# Patient Record
Sex: FEMALE | Race: WHITE | NOT HISPANIC OR LATINO | Employment: OTHER
[De-identification: names, ages, dates, MRNs, and addresses within clinical notes are randomized per-mention and may not be internally consistent; named-entity substitution may affect disease eponyms.]

---

## 2018-03-13 ENCOUNTER — TRANSCRIBE ORDERS (OUTPATIENT)
Dept: SCHEDULING | Age: 78
End: 2018-03-13

## 2018-03-13 DIAGNOSIS — S27.809A RUPTURE OF DIAPHRAGM: Primary | ICD-10-CM

## 2018-03-14 ENCOUNTER — HOSPITAL ENCOUNTER (OUTPATIENT)
Dept: RADIOLOGY | Facility: HOSPITAL | Age: 78
Discharge: HOME | End: 2018-03-14
Attending: SURGERY
Payer: COMMERCIAL

## 2018-03-14 DIAGNOSIS — S27.809A RUPTURE OF DIAPHRAGM: ICD-10-CM

## 2018-03-14 PROCEDURE — 76705 ECHO EXAM OF ABDOMEN: CPT

## 2018-03-15 ENCOUNTER — TRANSCRIBE ORDERS (OUTPATIENT)
Dept: CARDIOLOGY | Facility: HOSPITAL | Age: 78
End: 2018-03-15

## 2018-03-15 ENCOUNTER — HOSPITAL ENCOUNTER (OUTPATIENT)
Dept: CARDIOLOGY | Facility: HOSPITAL | Age: 78
Discharge: HOME | End: 2018-03-15
Attending: SURGERY
Payer: COMMERCIAL

## 2018-03-15 ENCOUNTER — APPOINTMENT (OUTPATIENT)
Dept: LAB | Facility: HOSPITAL | Age: 78
End: 2018-03-15
Attending: SURGERY
Payer: COMMERCIAL

## 2018-03-15 ENCOUNTER — TRANSCRIBE ORDERS (OUTPATIENT)
Dept: LAB | Facility: HOSPITAL | Age: 78
End: 2018-03-15

## 2018-03-15 DIAGNOSIS — S27.809A RUPTURE OF DIAPHRAGM: ICD-10-CM

## 2018-03-15 DIAGNOSIS — S27.809A RUPTURE OF DIAPHRAGM: Primary | ICD-10-CM

## 2018-03-15 LAB
ANION GAP SERPL CALC-SCNC: 7 MEQ/L (ref 3–15)
ATRIAL RATE: 70
BUN SERPL-MCNC: 13 MG/DL (ref 8–20)
CALCIUM SERPL-MCNC: 9.5 MG/DL (ref 8.9–10.3)
CHLORIDE SERPL-SCNC: 105 MMOL/L (ref 98–109)
CO2 SERPL-SCNC: 26 MMOL/L (ref 22–32)
CREAT SERPL-MCNC: 0.7 MG/DL (ref 0.6–1.1)
ERYTHROCYTE [DISTWIDTH] IN BLOOD BY AUTOMATED COUNT: 16 % (ref 11.7–14.4)
GFR SERPL CREATININE-BSD FRML MDRD: >60 ML/MIN/1.73M*2
GLUCOSE SERPL-MCNC: 100 MG/DL (ref 70–99)
HCT VFR BLDCO AUTO: 26.5 % (ref 35–45)
HGB BLD-MCNC: 8 G/DL (ref 11.8–15.7)
MCH RBC QN AUTO: 22.8 PG (ref 28–33.2)
MCHC RBC AUTO-ENTMCNC: 30.2 G/DL (ref 32.2–35.5)
MCV RBC AUTO: 75.5 FL (ref 83–98)
P AXIS: 27
PDW BLD AUTO: 10.2 FL (ref 9.4–12.3)
PLATELET # BLD AUTO: 273 K/UL (ref 150–369)
POTASSIUM SERPL-SCNC: 3.9 MMOL/L (ref 3.6–5.1)
PR INTERVAL: 174
QRS DURATION: 104
QT INTERVAL: 412
QTC CALCULATION(BAZETT): 444
R AXIS: 59
RBC # BLD AUTO: 3.51 M/UL (ref 3.93–5.22)
SODIUM SERPL-SCNC: 138 MMOL/L (ref 136–144)
T WAVE AXIS: 38
VENTRICULAR RATE: 70
WBC # BLD AUTO: 3.28 K/UL (ref 3.8–10.5)

## 2018-03-15 PROCEDURE — 36415 COLL VENOUS BLD VENIPUNCTURE: CPT

## 2018-03-15 PROCEDURE — 80048 BASIC METABOLIC PNL TOTAL CA: CPT

## 2018-03-15 PROCEDURE — 85027 COMPLETE CBC AUTOMATED: CPT

## 2018-03-15 PROCEDURE — 93005 ELECTROCARDIOGRAM TRACING: CPT

## 2018-03-19 ENCOUNTER — APPOINTMENT (OUTPATIENT)
Dept: LAB | Facility: HOSPITAL | Age: 78
End: 2018-03-19
Attending: SURGERY
Payer: COMMERCIAL

## 2018-03-19 ENCOUNTER — TRANSCRIBE ORDERS (OUTPATIENT)
Dept: LAB | Facility: HOSPITAL | Age: 78
End: 2018-03-19

## 2018-03-19 DIAGNOSIS — S27.809A RUPTURE OF DIAPHRAGM: ICD-10-CM

## 2018-03-19 DIAGNOSIS — S27.809A RUPTURE OF DIAPHRAGM: Primary | ICD-10-CM

## 2018-03-19 LAB
ABO + RH BLD: NORMAL
BLD GP AB SCN SERPL QL: NEGATIVE
BLOOD BANK CMNT PATIENT-IMP: NORMAL
D AG BLD QL: POSITIVE

## 2018-03-19 PROCEDURE — 36415 COLL VENOUS BLD VENIPUNCTURE: CPT

## 2018-03-19 PROCEDURE — 86920 COMPATIBILITY TEST SPIN: CPT | Mod: 91

## 2018-03-19 PROCEDURE — 86850 RBC ANTIBODY SCREEN: CPT

## 2018-03-19 PROCEDURE — 86920 COMPATIBILITY TEST SPIN: CPT

## 2018-03-28 ENCOUNTER — ANESTHESIA EVENT (OUTPATIENT)
Dept: OPERATING ROOM | Facility: HOSPITAL | Age: 78
Setting detail: HOSPITAL OUTPATIENT SURGERY
End: 2018-03-28
Payer: COMMERCIAL

## 2018-04-05 ENCOUNTER — HOSPITAL ENCOUNTER (OUTPATIENT)
Facility: HOSPITAL | Age: 78
Discharge: HOME | End: 2018-04-07
Attending: SURGERY | Admitting: SURGERY
Payer: COMMERCIAL

## 2018-04-05 ENCOUNTER — ANESTHESIA (OUTPATIENT)
Dept: OPERATING ROOM | Facility: HOSPITAL | Age: 78
Setting detail: HOSPITAL OUTPATIENT SURGERY
End: 2018-04-05
Payer: COMMERCIAL

## 2018-04-05 DIAGNOSIS — K44.9 HERNIA, HIATAL: ICD-10-CM

## 2018-04-05 LAB
ABO + RH BLD: NORMAL
D AG BLD QL: POSITIVE
ERYTHROCYTE [DISTWIDTH] IN BLOOD BY AUTOMATED COUNT: 16.2 % (ref 11.7–14.4)
HCT VFR BLDCO AUTO: 26.1 % (ref 35–45)
HGB BLD-MCNC: 7.8 G/DL (ref 11.8–15.7)
MCH RBC QN AUTO: 21.5 PG (ref 28–33.2)
MCHC RBC AUTO-ENTMCNC: 29.9 G/DL (ref 32.2–35.5)
MCV RBC AUTO: 71.9 FL (ref 83–98)
PDW BLD AUTO: 9.6 FL (ref 9.4–12.3)
PLATELET # BLD AUTO: 260 K/UL (ref 150–369)
RBC # BLD AUTO: 3.63 M/UL (ref 3.93–5.22)
WBC # BLD AUTO: 3.76 K/UL (ref 3.8–10.5)

## 2018-04-05 PROCEDURE — 71000011 HC PACU PHASE 1 EA ADDL MIN: Performed by: SURGERY

## 2018-04-05 PROCEDURE — 25800000 HC PHARMACY IV SOLUTIONS: Performed by: NURSE ANESTHETIST, CERTIFIED REGISTERED

## 2018-04-05 PROCEDURE — 36000016 HC OR LEVEL 6 EA ADDL MIN: Performed by: SURGERY

## 2018-04-05 PROCEDURE — S5000 PRESCRIPTION DRUG, GENERIC: HCPCS | Performed by: SURGERY

## 2018-04-05 PROCEDURE — 63700000 HC SELF-ADMINISTRABLE DRUG: Performed by: SURGERY

## 2018-04-05 PROCEDURE — 30233N1 TRANSFUSION OF NONAUTOLOGOUS RED BLOOD CELLS INTO PERIPHERAL VEIN, PERCUTANEOUS APPROACH: ICD-10-PCS | Performed by: SURGERY

## 2018-04-05 PROCEDURE — 63600000 HC DRUGS/DETAIL CODE: Performed by: NURSE ANESTHETIST, CERTIFIED REGISTERED

## 2018-04-05 PROCEDURE — 85027 COMPLETE CBC AUTOMATED: CPT | Performed by: SURGERY

## 2018-04-05 PROCEDURE — 63600000 HC DRUGS/DETAIL CODE: Mod: JW | Performed by: NURSE ANESTHETIST, CERTIFIED REGISTERED

## 2018-04-05 PROCEDURE — 88304 TISSUE EXAM BY PATHOLOGIST: CPT | Performed by: SURGERY

## 2018-04-05 PROCEDURE — 0DV44ZZ RESTRICTION OF ESOPHAGOGASTRIC JUNCTION, PERCUTANEOUS ENDOSCOPIC APPROACH: ICD-10-PCS | Performed by: SURGERY

## 2018-04-05 PROCEDURE — 0FT44ZZ RESECTION OF GALLBLADDER, PERCUTANEOUS ENDOSCOPIC APPROACH: ICD-10-PCS | Performed by: SURGERY

## 2018-04-05 PROCEDURE — 36000006 HC OR LEVEL 6 INITIAL 30MIN: Performed by: SURGERY

## 2018-04-05 PROCEDURE — 25800000 HC PHARMACY IV SOLUTIONS: Performed by: SURGERY

## 2018-04-05 PROCEDURE — 27200000 HC STERILE SUPPLY: Performed by: SURGERY

## 2018-04-05 PROCEDURE — 63600000 HC DRUGS/DETAIL CODE: Performed by: INTERNAL MEDICINE

## 2018-04-05 PROCEDURE — 8E0W4CZ ROBOTIC ASSISTED PROCEDURE OF TRUNK REGION, PERCUTANEOUS ENDOSCOPIC APPROACH: ICD-10-PCS | Performed by: SURGERY

## 2018-04-05 PROCEDURE — 36415 COLL VENOUS BLD VENIPUNCTURE: CPT | Performed by: SURGERY

## 2018-04-05 PROCEDURE — S5000 PRESCRIPTION DRUG, GENERIC: HCPCS | Performed by: NURSE ANESTHETIST, CERTIFIED REGISTERED

## 2018-04-05 PROCEDURE — 0BQT4ZZ REPAIR DIAPHRAGM, PERCUTANEOUS ENDOSCOPIC APPROACH: ICD-10-PCS | Performed by: SURGERY

## 2018-04-05 PROCEDURE — 37000001 HC ANESTHESIA GENERAL: Performed by: SURGERY

## 2018-04-05 PROCEDURE — 71000001 HC PACU PHASE 1 INITIAL 30MIN: Performed by: SURGERY

## 2018-04-05 PROCEDURE — 25000000 HC PHARMACY GENERAL: Performed by: NURSE ANESTHETIST, CERTIFIED REGISTERED

## 2018-04-05 PROCEDURE — 25000000 HC PHARMACY GENERAL: Performed by: SURGERY

## 2018-04-05 RX ORDER — LIDOCAINE HYDROCHLORIDE 20 MG/ML
INJECTION, SOLUTION INFILTRATION; PERINEURAL AS NEEDED
Status: DISCONTINUED | OUTPATIENT
Start: 2018-04-05 | End: 2018-04-05 | Stop reason: HOSPADM

## 2018-04-05 RX ORDER — SODIUM CHLORIDE 9 MG/ML
INJECTION, SOLUTION INTRAVENOUS CONTINUOUS
Status: DISCONTINUED | OUTPATIENT
Start: 2018-04-05 | End: 2018-04-05

## 2018-04-05 RX ORDER — GLYCOPYRROLATE 0.6MG/3ML
SYRINGE (ML) INTRAVENOUS AS NEEDED
Status: DISCONTINUED | OUTPATIENT
Start: 2018-04-05 | End: 2018-04-05 | Stop reason: SURG

## 2018-04-05 RX ORDER — MIDAZOLAM HYDROCHLORIDE 2 MG/2ML
INJECTION, SOLUTION INTRAMUSCULAR; INTRAVENOUS AS NEEDED
Status: DISCONTINUED | OUTPATIENT
Start: 2018-04-05 | End: 2018-04-05 | Stop reason: SURG

## 2018-04-05 RX ORDER — NEOSTIGMINE METHYLSULFATE 1 MG/ML
INJECTION INTRAVENOUS AS NEEDED
Status: DISCONTINUED | OUTPATIENT
Start: 2018-04-05 | End: 2018-04-05 | Stop reason: SURG

## 2018-04-05 RX ORDER — FENTANYL CITRATE 50 UG/ML
50 INJECTION, SOLUTION INTRAMUSCULAR; INTRAVENOUS
Status: DISCONTINUED | OUTPATIENT
Start: 2018-04-05 | End: 2018-04-05 | Stop reason: HOSPADM

## 2018-04-05 RX ORDER — ONDANSETRON HYDROCHLORIDE 2 MG/ML
4 INJECTION, SOLUTION INTRAVENOUS EVERY 6 HOURS PRN
Status: DISCONTINUED | OUTPATIENT
Start: 2018-04-05 | End: 2018-04-07 | Stop reason: HOSPADM

## 2018-04-05 RX ORDER — ONDANSETRON HYDROCHLORIDE 2 MG/ML
4 INJECTION, SOLUTION INTRAVENOUS
Status: DISCONTINUED | OUTPATIENT
Start: 2018-04-05 | End: 2018-04-05 | Stop reason: HOSPADM

## 2018-04-05 RX ORDER — SODIUM CHLORIDE, SODIUM GLUCONATE, SODIUM ACETATE, POTASSIUM CHLORIDE AND MAGNESIUM CHLORIDE 30; 37; 368; 526; 502 MG/100ML; MG/100ML; MG/100ML; MG/100ML; MG/100ML
INJECTION, SOLUTION INTRAVENOUS CONTINUOUS PRN
Status: DISCONTINUED | OUTPATIENT
Start: 2018-04-05 | End: 2018-04-05 | Stop reason: SURG

## 2018-04-05 RX ORDER — BUPIVACAINE HYDROCHLORIDE 5 MG/ML
INJECTION, SOLUTION PERINEURAL AS NEEDED
Status: DISCONTINUED | OUTPATIENT
Start: 2018-04-05 | End: 2018-04-05 | Stop reason: HOSPADM

## 2018-04-05 RX ORDER — SODIUM CHLORIDE 9 MG/ML
5 INJECTION, SOLUTION INTRAVENOUS AS NEEDED
Status: DISCONTINUED | OUTPATIENT
Start: 2018-04-05 | End: 2018-04-05

## 2018-04-05 RX ORDER — PHENYLEPHRINE HYDROCHLORIDE 10 MG/ML
INJECTION INTRAVENOUS AS NEEDED
Status: DISCONTINUED | OUTPATIENT
Start: 2018-04-05 | End: 2018-04-05 | Stop reason: SURG

## 2018-04-05 RX ORDER — ASPIRIN 81 MG/1
81 TABLET ORAL EVERY MORNING
Status: DISCONTINUED | OUTPATIENT
Start: 2018-04-06 | End: 2018-04-07 | Stop reason: HOSPADM

## 2018-04-05 RX ORDER — DEXAMETHASONE SODIUM PHOSPHATE 4 MG/ML
INJECTION, SOLUTION INTRA-ARTICULAR; INTRALESIONAL; INTRAMUSCULAR; INTRAVENOUS; SOFT TISSUE AS NEEDED
Status: DISCONTINUED | OUTPATIENT
Start: 2018-04-05 | End: 2018-04-05 | Stop reason: SURG

## 2018-04-05 RX ORDER — FENTANYL CITRATE 50 UG/ML
INJECTION, SOLUTION INTRAMUSCULAR; INTRAVENOUS AS NEEDED
Status: DISCONTINUED | OUTPATIENT
Start: 2018-04-05 | End: 2018-04-05 | Stop reason: SURG

## 2018-04-05 RX ORDER — AMLODIPINE BESYLATE 5 MG/1
5 TABLET ORAL NIGHTLY
Status: DISCONTINUED | OUTPATIENT
Start: 2018-04-05 | End: 2018-04-07 | Stop reason: HOSPADM

## 2018-04-05 RX ORDER — SODIUM CHLORIDE 9 MG/ML
80 INJECTION, SOLUTION INTRAVENOUS CONTINUOUS
Status: DISCONTINUED | OUTPATIENT
Start: 2018-04-05 | End: 2018-04-07 | Stop reason: HOSPADM

## 2018-04-05 RX ORDER — ONDANSETRON HYDROCHLORIDE 2 MG/ML
INJECTION, SOLUTION INTRAVENOUS AS NEEDED
Status: DISCONTINUED | OUTPATIENT
Start: 2018-04-05 | End: 2018-04-05 | Stop reason: SURG

## 2018-04-05 RX ORDER — ROCURONIUM BROMIDE 10 MG/ML
INJECTION, SOLUTION INTRAVENOUS AS NEEDED
Status: DISCONTINUED | OUTPATIENT
Start: 2018-04-05 | End: 2018-04-05 | Stop reason: SURG

## 2018-04-05 RX ORDER — HYDROMORPHONE HYDROCHLORIDE 1 MG/ML
0.5 INJECTION, SOLUTION INTRAMUSCULAR; INTRAVENOUS; SUBCUTANEOUS
Status: DISCONTINUED | OUTPATIENT
Start: 2018-04-05 | End: 2018-04-05 | Stop reason: HOSPADM

## 2018-04-05 RX ORDER — MORPHINE SULFATE 1 MG/ML
INJECTION, SOLUTION EPIDURAL; INTRATHECAL; INTRAVENOUS AS NEEDED
Status: DISCONTINUED | OUTPATIENT
Start: 2018-04-05 | End: 2018-04-05 | Stop reason: SURG

## 2018-04-05 RX ORDER — PROPOFOL 10 MG/ML
INJECTION, EMULSION INTRAVENOUS AS NEEDED
Status: DISCONTINUED | OUTPATIENT
Start: 2018-04-05 | End: 2018-04-05 | Stop reason: SURG

## 2018-04-05 RX ORDER — ACETAMINOPHEN 325 MG/1
975 TABLET ORAL ONCE
Status: COMPLETED | OUTPATIENT
Start: 2018-04-05 | End: 2018-04-05

## 2018-04-05 RX ORDER — LIDOCAINE HYDROCHLORIDE 10 MG/ML
INJECTION, SOLUTION INFILTRATION; PERINEURAL AS NEEDED
Status: DISCONTINUED | OUTPATIENT
Start: 2018-04-05 | End: 2018-04-05 | Stop reason: SURG

## 2018-04-05 RX ORDER — MORPHINE SULFATE 4 MG/ML
1-2 INJECTION, SOLUTION INTRAMUSCULAR; INTRAVENOUS
Status: DISCONTINUED | OUTPATIENT
Start: 2018-04-05 | End: 2018-04-07 | Stop reason: HOSPADM

## 2018-04-05 RX ORDER — ATORVASTATIN CALCIUM 40 MG/1
40 TABLET, FILM COATED ORAL NIGHTLY
Status: DISCONTINUED | OUTPATIENT
Start: 2018-04-05 | End: 2018-04-07 | Stop reason: HOSPADM

## 2018-04-05 RX ORDER — EPHEDRINE SULFATE 50 MG/ML
INJECTION, SOLUTION INTRAVENOUS AS NEEDED
Status: DISCONTINUED | OUTPATIENT
Start: 2018-04-05 | End: 2018-04-05 | Stop reason: SURG

## 2018-04-05 RX ADMIN — Medication 5 MG: at 17:11

## 2018-04-05 RX ADMIN — MORPHINE SULFATE 2 MG: 1 INJECTION, SOLUTION EPIDURAL; INTRATHECAL; INTRAVENOUS at 17:25

## 2018-04-05 RX ADMIN — ONDANSETRON 4 MG: 2 INJECTION INTRAMUSCULAR; INTRAVENOUS at 17:02

## 2018-04-05 RX ADMIN — ROCURONIUM BROMIDE 20 MG: 10 INJECTION INTRAVENOUS at 15:19

## 2018-04-05 RX ADMIN — EPHEDRINE SULFATE 10 MG: 50 INJECTION INTRAVENOUS at 15:10

## 2018-04-05 RX ADMIN — ROCURONIUM BROMIDE 10 MG: 10 INJECTION INTRAVENOUS at 16:21

## 2018-04-05 RX ADMIN — PHENYLEPHRINE HYDROCHLORIDE 100 MCG: 10 INJECTION INTRAVENOUS at 16:57

## 2018-04-05 RX ADMIN — AMLODIPINE BESYLATE 5 MG: 5 TABLET ORAL at 22:03

## 2018-04-05 RX ADMIN — FENTANYL CITRATE 50 MCG: 50 INJECTION INTRAMUSCULAR; INTRAVENOUS at 18:30

## 2018-04-05 RX ADMIN — PROPOFOL 150 MG: 10 INJECTION, EMULSION INTRAVENOUS at 14:18

## 2018-04-05 RX ADMIN — FENTANYL CITRATE 25 MCG: 50 INJECTION INTRAMUSCULAR; INTRAVENOUS at 15:06

## 2018-04-05 RX ADMIN — MORPHINE SULFATE 2 MG: 1 INJECTION, SOLUTION EPIDURAL; INTRATHECAL; INTRAVENOUS at 16:31

## 2018-04-05 RX ADMIN — ACETAMINOPHEN 975 MG: 325 TABLET, FILM COATED ORAL at 11:51

## 2018-04-05 RX ADMIN — MIDAZOLAM HYDROCHLORIDE 2 MG: 1 INJECTION, SOLUTION INTRAMUSCULAR; INTRAVENOUS at 14:11

## 2018-04-05 RX ADMIN — ROCURONIUM BROMIDE 50 MG: 10 INJECTION INTRAVENOUS at 14:18

## 2018-04-05 RX ADMIN — DEXAMETHASONE SODIUM PHOSPHATE 4 MG: 4 INJECTION, SOLUTION INTRAMUSCULAR; INTRAVENOUS at 14:30

## 2018-04-05 RX ADMIN — FENTANYL CITRATE 100 MCG: 50 INJECTION INTRAMUSCULAR; INTRAVENOUS at 14:18

## 2018-04-05 RX ADMIN — Medication 0.8 MG: at 17:11

## 2018-04-05 RX ADMIN — LIDOCAINE HYDROCHLORIDE 5 ML: 10 INJECTION, SOLUTION INFILTRATION; PERINEURAL at 14:18

## 2018-04-05 RX ADMIN — ATORVASTATIN CALCIUM 40 MG: 40 TABLET, FILM COATED ORAL at 22:03

## 2018-04-05 RX ADMIN — PHENYLEPHRINE HYDROCHLORIDE 100 MCG: 10 INJECTION INTRAVENOUS at 16:05

## 2018-04-05 RX ADMIN — MORPHINE SULFATE 2 MG: 1 INJECTION, SOLUTION EPIDURAL; INTRATHECAL; INTRAVENOUS at 17:33

## 2018-04-05 RX ADMIN — FENTANYL CITRATE 25 MCG: 50 INJECTION INTRAMUSCULAR; INTRAVENOUS at 15:20

## 2018-04-05 RX ADMIN — PHENYLEPHRINE HYDROCHLORIDE 100 MCG: 10 INJECTION INTRAVENOUS at 14:31

## 2018-04-05 RX ADMIN — SODIUM CHLORIDE: 9 INJECTION, SOLUTION INTRAVENOUS at 10:00

## 2018-04-05 RX ADMIN — MORPHINE SULFATE 2 MG: 1 INJECTION, SOLUTION EPIDURAL; INTRATHECAL; INTRAVENOUS at 16:40

## 2018-04-05 RX ADMIN — EPHEDRINE SULFATE 10 MG: 50 INJECTION INTRAVENOUS at 15:07

## 2018-04-05 RX ADMIN — MORPHINE SULFATE 4 MG: 1 INJECTION, SOLUTION EPIDURAL; INTRATHECAL; INTRAVENOUS at 15:35

## 2018-04-05 RX ADMIN — SODIUM CHLORIDE, SODIUM GLUCONATE, SODIUM ACETATE, POTASSIUM CHLORIDE AND MAGNESIUM CHLORIDE: 526; 502; 368; 37; 30 INJECTION, SOLUTION INTRAVENOUS at 14:23

## 2018-04-05 RX ADMIN — FENTANYL CITRATE 50 MCG: 50 INJECTION INTRAMUSCULAR; INTRAVENOUS at 14:50

## 2018-04-05 RX ADMIN — SODIUM CHLORIDE: 9 INJECTION, SOLUTION INTRAVENOUS at 19:42

## 2018-04-05 ASSESSMENT — PAIN SCALES - GENERAL: PAIN_LEVEL: 4

## 2018-04-05 NOTE — ANESTHESIA PREPROCEDURE EVALUATION
Anesthesia ROS/MED HX    Anesthesia History - neg  Pulmonary - neg  Neuro/Psych - neg  Cardiovascular   ECG reviewed   Normal ECG   hypertension  GI/Hepatic   Hiatal hernia   GERD    Control: well controlled  Musculoskeletal- neg  Endo/Other   Chronic renal disease (nephrolithiasis)   Body Habitus: Normal  ROS/MED HX Comments:    Cardiology: Anemia- hgb 7.8. GI workup negative      Relevant Problems   No active problems are marked relevant to this note.       Physical Exam    Airway   Mallampati: II   TM distance: >3 FB   Neck ROM: full  Cardiovascular - normal   Rhythm: regular   Rate: normal  Pulmonary - normal   clear to auscultation  Other Findings   Implants througout        Anesthesia Plan    Plan: general    Technique: general endotracheal     Airway: oral intubation   ASA 2  Blood Products:     Use of Blood Products Discussed: Yes     Consented to blood products  Anesthetic plan and risks discussed with: patient  Induction:    intravenous   Postop Plan:   Patient Disposition: inpatient floor planned admission  Comments:    Plan: I unit PRBCs preop

## 2018-04-05 NOTE — INTERVAL H&P NOTE
H&P updated. The patient was examined and there are no changes.  Pt more anemic, will receive 1 U PRBCs

## 2018-04-05 NOTE — ANESTHESIA POSTPROCEDURE EVALUATION
Patient: Nelly Meyer    Procedure Summary     Date:  04/05/18 Room / Location:   OR  /  OR    Anesthesia Start:  1411 Anesthesia Stop:  1743    Procedure:  Robotic Assisted Laparoscopic Hiatal Hernia Repair, Nissen, Laparoscopic Cholecystectomy (N/A ) Diagnosis:  (Hiatal Hernia)    Surgeon:  Durga Xie DO Responsible Provider:  Kadi De La Cruz DO    Anesthesia Type:  general ASA Status:  2          Anesthesia Type: general  PACU Vitals     No data found.            Anesthesia Post Evaluation    Pain score: 4  Pain management: adequate  Patient location during evaluation: PACU  Patient participation: complete - patient participated  Level of consciousness: awake and alert  Cardiovascular status: acceptable  Respiratory status: acceptable  Hydration status: acceptable  Anesthetic complications: no

## 2018-04-05 NOTE — OP NOTE
Date: 5 April 2018    Surgeon: Rojas    Procedure: 1.  Robotically assisted laparoscopic hiatal hernia repair and Nissen fundoplication 2.  Robotically assisted laparoscopic cholecystectomy    Preoperative diagnosis #1 paraesophageal hernia #2 hiatal hernia #3 chronic calculus cholecystitis    Postoperative diagnosis: #1 same as above #2 same as above #3 same as above    Indication: Patient is a 77-year-old white female with reflux and minimal dysphasia and anemia.  She was discovered to have a sizable paraesophageal hernia on endoscopy with Pankaj's ulcers.  The patient also complained of some intermittent right upper quadrant abdominal pain and ultrasound demonstrated gallstones.    Anesthesia: General with endotracheal tube    Estimated blood loss: 30 cc    Complications: None    Specimens: Hiatal hernia sac and gallbladder    Findings: Moderately sized paraesophageal hiatal hernia.  Gallbladder with adhesions  Details of procedure: After obtaining informed consent and correctly identified the patient she was brought to the operative suite and placed in the supine position.  After induction of  General anesthesia Lopez catheter was placed.  A footboard was placed below the patient's feet.  Left arm was tucked at her side.  She was placed in a flexed head position to extend the patient.  Her abdomen was then prepped and draped in standard surgical fashion.  It was entered in the left upper quadrant and a Visiport fashion.  Once the abdomen was introduced insufflated to pressure of 15 mmHg.  3 8 mm robotic trochars were placed one above the umbilicus and then one on each side of this.  An additional 5 mm laparoscopic trocar was placed in the lateral right upper quadrant.  The liver retractor was placed in and used to elevate the left lobe of the liver.  This was fixed in place with the iron intern.  Patient was placed in a steep reverse Trendelenburg position.  The robot was brought in and docked.  Using vessel  sealer and fenestrated bipolar grasper the hernia sac was dissected out of the lower mediastinum after reducing stomach back into the abdomen.  Hernia sac was amputated off of the fundus of the stomach and the gastroesophageal junction where it was attached.  It was set beneath the gallbladder to be removed without specimen.  Dissection was then undertaken to free the stomach and esophagus from the crura of the diaphragm.  The retroesophageal window of good size was created with blunt dissection and the vessel sealer.  Following this the short gastric vessels were taken down to free the fundus and proximal greater curvature of the stomach.  Fundus was seen to pass easily from left to right.  Following this the hiatal hernia was repaired with 5 interrupted 0 Ethibond sutures.  3 of these were posteriorly into anterior.  A 54 Vietnamese bougie was then placed under direct visualization.  There was initially some resistance and this could not be passed beyond 50 cm.  I retracted the stomach and this was seen to be caught at the distal esophagus.  This was pulled back and was straight and the esophagus passed easily into the stomach.  The fundus was passed around the back of the gastroesophageal junction prior to fully placing the bougie to a depth of 65 cm.  3 interrupted 0 Ethibond sutures were then placed to suture the fundus to itself anteriorly with the proximal 2 sutures taking bites of esophagus.  Following this the bougie was removed.  The fundoplication was sutured to the diaphragmatic hiatus medially and laterally.  This was done with an interrupted 0 Ethibond suture.  After this the liver retractor was removed.  Assistant grasped the fundus the gallbladder and retract this anteriorly and superiorly.  The omental adhesions to the gallbladder were taken down with electrocautery and blunt dissection.  The Alejandro's pouch was grasped with another grasper and retracted anteriorly and inferiorly.  The fat and  peritoneum were dissected off the neck of the gallbladder.  This did reveal the cystic duct without a distinct cystic artery.  The cystic artery was clipped with 2 clips distally, one proximally and transected between.  Gallbladder was then dissected off the liver bed with electrocautery.  This along with hernia sac were placed in an Endobag and removed through the left upper quadrant robotic trocar after undocking the robot.  The fascia and this incision which had to be enlarged and have an 11 mm trocar placed in it to accommodate the bag was reapproximated with a single interrupted 0 Vicryl using the bursae fascial closure device.  The abdomen was then desufflated and all trochars removed.  The enlarged incision was irrigated and dried.  All skin edges were inspected for bleeding all bleeding was controlled with electrocautery.  Skin was then approximated with 4-0 Monocryl in a running some particular interrupted fashion.  The wounds were washed and dried and Mastisol and Steri-Strips were applied.  The patient tolerated procedure well left the operating room for the recovery room in stable condition after being extubated and having her Lopez catheter removed.

## 2018-04-06 PROCEDURE — S5000 PRESCRIPTION DRUG, GENERIC: HCPCS | Performed by: SURGERY

## 2018-04-06 PROCEDURE — 63700000 HC SELF-ADMINISTRABLE DRUG: Performed by: SURGERY

## 2018-04-06 PROCEDURE — 25800000 HC PHARMACY IV SOLUTIONS: Performed by: SURGERY

## 2018-04-06 PROCEDURE — 63700000 HC SELF-ADMINISTRABLE DRUG: Performed by: NURSE PRACTITIONER

## 2018-04-06 RX ORDER — ACETAMINOPHEN 325 MG/1
650 TABLET ORAL EVERY 6 HOURS PRN
Status: DISCONTINUED | OUTPATIENT
Start: 2018-04-06 | End: 2018-04-07 | Stop reason: HOSPADM

## 2018-04-06 RX ADMIN — LISINOPRIL: 20 TABLET ORAL at 21:47

## 2018-04-06 RX ADMIN — AMLODIPINE BESYLATE 5 MG: 5 TABLET ORAL at 21:48

## 2018-04-06 RX ADMIN — ACETAMINOPHEN 650 MG: 325 TABLET, FILM COATED ORAL at 21:48

## 2018-04-06 RX ADMIN — SODIUM CHLORIDE 80 ML/HR: 9 INJECTION, SOLUTION INTRAVENOUS at 21:46

## 2018-04-06 RX ADMIN — ASPIRIN 81 MG: 81 TABLET, COATED ORAL at 21:47

## 2018-04-06 RX ADMIN — SODIUM CHLORIDE 80 ML/HR: 9 INJECTION, SOLUTION INTRAVENOUS at 08:53

## 2018-04-06 RX ADMIN — ATORVASTATIN CALCIUM 40 MG: 40 TABLET, FILM COATED ORAL at 21:47

## 2018-04-06 ASSESSMENT — COGNITIVE AND FUNCTIONAL STATUS - GENERAL
DRESSING REGULAR LOWER BODY CLOTHING: 4 - NONE
STANDING UP FROM CHAIR USING ARMS: 4 - NONE
TOILETING: 4 - NONE
WALKING IN HOSPITAL ROOM: 4 - NONE
DRESSING REGULAR UPPER BODY CLOTHING: 4 - NONE
CLIMB 3 TO 5 STEPS WITH RAILING: 4 - NONE
HELP NEEDED FOR BATHING: 4 - NONE
MOVING TO AND FROM BED TO CHAIR: 4 - NONE
EATING MEALS: 4 - NONE
HELP NEEDED FOR PERSONAL GROOMING: 4 - NONE

## 2018-04-06 NOTE — PLAN OF CARE
Problem: Patient Care Overview  Goal: Plan of Care Review  Outcome: Ongoing (interventions implemented as appropriate)   04/06/18 7107   Coping/Psychosocial   Plan Of Care Reviewed With patient   Plan of Care Review   Progress improving

## 2018-04-06 NOTE — PROGRESS NOTES
4/6/201810:04 AM sw met with pt and pts sister at bedside to introduce CC role and discuss dc planning, pt stated she is feeling pretty good a bit sore but overall is feeling well, prior to admission pt was very independent, pt lives alone in a first floor condo, confirmed PCP, pt stated that her sister is going to be staying with her to provide additional support int he home upon discharge. Pt and sister at this time do not anticipate any dc needs. Reviewed MD note and plan is for dc tomorrow pending medical clearance. Pt did not have any follow up questions or concerns. Estefany Anthony LSW [phone]

## 2018-04-06 NOTE — PROGRESS NOTES
"General Surgery Daily Progress Note    Subjective: Pt seen and examined.  C/o abd soreness.  Giuliana CLs    Vital signs in last 24 hours:  Temp:  [36.1 °C (97 °F)-37.3 °C (99.2 °F)] 36.5 °C (97.7 °F)  Heart Rate:  [69-95] 71  Resp:  [9-18] 18  BP: (116-158)/(56-73) 154/65    Intake/Output this shift:    Intake/Output Summary (Last 24 hours) at 04/06/18 0805  Last data filed at 04/06/18 0600   Gross per 24 hour   Intake             4049 ml   Output              900 ml   Net             3149 ml       Physical Exam    General appearance: alert, appears stated age and cooperative    Abdomen: soft, non-tender, inc CDI; bowel sounds normal; no masses, no organomegaly\"      Labs        Results from last 7 days  Lab Units 04/05/18  0942   WBC K/uL 3.76*   HEMOGLOBIN g/dL 7.8*   HEMATOCRIT % 26.1*   PLATELETS K/uL 260               Results from last 7 days  Lab Units 04/05/18  0942   HEMOGLOBIN g/dL 7.8*   HEMATOCRIT % 26.1*     No results found for: LIPASE  Pathology Results     ** No results found for the last 720 hours. **              Imaging  Ultrasound Abdomen Limited    Result Date: 3/14/2018  IMPRESSION: Cholelithiasis.        Assessment   POD1 s/p robot asst lap HH repair/Nissen/lap janny    Plan   Adv to FLs, OOBTC, likely d/c tomorrow         Durga Xie, DO        "

## 2018-04-06 NOTE — PLAN OF CARE
Problem: Patient Care Overview  Goal: Plan of Care Review  Outcome: Ongoing (interventions implemented as appropriate)   04/05/18 7604   Coping/Psychosocial   Plan Of Care Reviewed With patient   Plan of Care Review   Progress improving

## 2018-04-07 VITALS
DIASTOLIC BLOOD PRESSURE: 70 MMHG | RESPIRATION RATE: 18 BRPM | TEMPERATURE: 97.9 F | OXYGEN SATURATION: 92 % | HEIGHT: 67 IN | BODY MASS INDEX: 26.53 KG/M2 | HEART RATE: 72 BPM | SYSTOLIC BLOOD PRESSURE: 157 MMHG | WEIGHT: 169 LBS

## 2018-04-07 PROCEDURE — 63700000 HC SELF-ADMINISTRABLE DRUG: Performed by: NURSE PRACTITIONER

## 2018-04-07 RX ADMIN — ACETAMINOPHEN 650 MG: 325 TABLET, FILM COATED ORAL at 12:27

## 2018-04-07 NOTE — PROGRESS NOTES
General Surgery Daily Progress Note    Subjective     Tolerating full liquids. Anxious to get home. Pain controlled.       Objective     Vital signs in last 24 hours:  Temp:  [36.7 °C (98 °F)-36.9 °C (98.4 °F)] 36.9 °C (98.4 °F)  Heart Rate:  [72-76] 76  Resp:  [18] 18  BP: (140-153)/(67-70) 153/67      Intake/Output Summary (Last 24 hours) at 04/07/18 1556  Last data filed at 04/07/18 1500   Gross per 24 hour   Intake              440 ml   Output              600 ml   Net             -160 ml     Intake/Output this shift:  I/O this shift:  In: 440 [P.O.:440]  Out: -     Physical Exam    General appearance: Awake, alert, in no acute distress or obvious discomfort  Neurologic: Alert, cooperative. Coherent speech.  Lungs: Respirations nonlabored, no tachypnea. Clear to auscultation  Heart:: Regular rate, rhythm.  Abdomen: soft, nontender. Mild ecchymosis at 2 incisions. Incisions + steris, intact, no drainage.   Extremities: No cyanosis, edema.       Labs  CBC Results       04/05/18 03/15/18                       0942 0905          WBC 3.76 (L) 3.28 (L)          RBC 3.63 (L) 3.51 (L)          HGB 7.8 (L) 8.0 (L)          HCT 26.1 (L) 26.5 (L)          MCV 71.9 (L) 75.5 (L)          MCH 21.5 (L) 22.8 (L)          MCHC 29.9 (L) 30.2 (L)           273                       BMP Results       03/15/18                          0905                      K 3.9           Cl 105           CO2 26           Glucose 100 (H)           BUN 13           Creatinine 0.7           Calcium 9.5           Anion Gap 7           EGFR &gt;60.0                             Assessment/Plan   POD 2 s/p lap janny, Nissen, doing well. Stable for d/c to home.       Yaneth Morris MD

## 2018-04-07 NOTE — NURSING NOTE
Pt being d/c to home.  All instructions explained to pt.  Pt verbalized an understanding.  All belongings with pt.  Ivs removed.

## 2018-04-09 LAB
CASE RPRT: NORMAL
CLINICAL INFO: NORMAL
CROSSMATCH: NORMAL
ISBT CODE: 6200
PATH REPORT.FINAL DX SPEC: NORMAL
PATH REPORT.GROSS SPEC: NORMAL
PATH REPORT.MICROSCOPIC SPEC OTHER STN: NORMAL
PRODUCT CODE: NORMAL
PRODUCT STATUS: NORMAL
SPECIMEN EXP DATE BLD: NORMAL
UNIT ABO: NORMAL
UNIT ID: NORMAL
UNIT RH: POSITIVE

## 2018-04-13 ENCOUNTER — TRANSCRIBE ORDERS (OUTPATIENT)
Dept: LAB | Facility: HOSPITAL | Age: 78
End: 2018-04-13

## 2018-04-13 ENCOUNTER — APPOINTMENT (OUTPATIENT)
Dept: LAB | Facility: HOSPITAL | Age: 78
End: 2018-04-13
Attending: SURGERY
Payer: COMMERCIAL

## 2018-04-13 DIAGNOSIS — S27.809A RUPTURE OF DIAPHRAGM: Primary | ICD-10-CM

## 2018-04-13 DIAGNOSIS — S27.809A RUPTURE OF DIAPHRAGM: ICD-10-CM

## 2018-04-13 LAB
ERYTHROCYTE [DISTWIDTH] IN BLOOD BY AUTOMATED COUNT: 17.4 % (ref 11.7–14.4)
HCT VFR BLDCO AUTO: 28.5 % (ref 35–45)
HGB BLD-MCNC: 8.8 G/DL (ref 11.8–15.7)
MCH RBC QN AUTO: 22.3 PG (ref 28–33.2)
MCHC RBC AUTO-ENTMCNC: 30.9 G/DL (ref 32.2–35.5)
MCV RBC AUTO: 72.2 FL (ref 83–98)
PDW BLD AUTO: 10.1 FL (ref 9.4–12.3)
PLATELET # BLD AUTO: 288 K/UL (ref 150–369)
RBC # BLD AUTO: 3.95 M/UL (ref 3.93–5.22)
WBC # BLD AUTO: 4.44 K/UL (ref 3.8–10.5)

## 2018-04-13 PROCEDURE — 36415 COLL VENOUS BLD VENIPUNCTURE: CPT

## 2018-04-13 PROCEDURE — 85027 COMPLETE CBC AUTOMATED: CPT

## 2018-05-17 ENCOUNTER — TRANSCRIBE ORDERS (OUTPATIENT)
Dept: LAB | Facility: HOSPITAL | Age: 78
End: 2018-05-17

## 2018-05-17 ENCOUNTER — APPOINTMENT (OUTPATIENT)
Dept: LAB | Facility: HOSPITAL | Age: 78
End: 2018-05-17
Attending: SURGERY
Payer: COMMERCIAL

## 2018-05-17 DIAGNOSIS — S27.809A RUPTURE OF DIAPHRAGM: ICD-10-CM

## 2018-05-17 DIAGNOSIS — S27.809A RUPTURE OF DIAPHRAGM: Primary | ICD-10-CM

## 2018-05-17 LAB
ACANTHOCYTES BLD QL SMEAR: ABNORMAL
ANISOCYTOSIS BLD QL SMEAR: ABNORMAL
BASOPHILS # BLD: 0.05 K/UL (ref 0.01–0.1)
BASOPHILS NFR BLD: 1.5 %
BURR CELLS BLD QL SMEAR: ABNORMAL
DACRYOCYTES BLD QL SMEAR: ABNORMAL
DIFFERENTIAL METHOD BLD: ABNORMAL
EOSINOPHIL # BLD: 0.14 K/UL (ref 0.04–0.36)
EOSINOPHIL NFR BLD: 4.1 %
ERYTHROCYTE [DISTWIDTH] IN BLOOD BY AUTOMATED COUNT: 20.3 % (ref 11.7–14.4)
HCT VFR BLDCO AUTO: 30.6 % (ref 35–45)
HGB BLD-MCNC: 9.2 G/DL (ref 11.8–15.7)
HYPOCHROMIA BLD QL SMEAR: ABNORMAL
IMM GRANULOCYTES # BLD AUTO: 0.01 K/UL (ref 0–0.08)
IMM GRANULOCYTES NFR BLD AUTO: 0.3 %
LYMPHOCYTES # BLD: 1.43 K/UL (ref 1.2–3.5)
LYMPHOCYTES NFR BLD: 41.7 %
MCH RBC QN AUTO: 22.2 PG (ref 28–33.2)
MCHC RBC AUTO-ENTMCNC: 30.1 G/DL (ref 32.2–35.5)
MCV RBC AUTO: 73.7 FL (ref 83–98)
MICROCYTES BLD QL SMEAR: ABNORMAL
MONOCYTES # BLD: 0.39 K/UL (ref 0.28–0.8)
MONOCYTES NFR BLD: 11.4 %
NEUTROPHILS # BLD: 1.41 K/UL (ref 1.7–7)
NEUTS SEG NFR BLD: 41 %
NRBC BLD-RTO: 0 %
OVALOCYTES BLD QL SMEAR: ABNORMAL
PDW BLD AUTO: 10.3 FL (ref 9.4–12.3)
PLAT MORPH BLD: NORMAL
PLATELET # BLD AUTO: 265 K/UL (ref 150–369)
PLATELET # BLD EST: ABNORMAL 10*3/UL
POIKILOCYTOSIS BLD QL SMEAR: ABNORMAL
POLYCHROMASIA BLD QL SMEAR: ABNORMAL
RBC # BLD AUTO: 4.15 M/UL (ref 3.93–5.22)
SCHISTOCYTES BLD QL SMEAR: ABNORMAL
WBC # BLD AUTO: 3.43 K/UL (ref 3.8–10.5)

## 2018-05-17 PROCEDURE — 36415 COLL VENOUS BLD VENIPUNCTURE: CPT

## 2018-05-17 PROCEDURE — 85025 COMPLETE CBC W/AUTO DIFF WBC: CPT

## 2018-06-14 ENCOUNTER — APPOINTMENT (OUTPATIENT)
Dept: LAB | Facility: HOSPITAL | Age: 78
End: 2018-06-14
Attending: SURGERY
Payer: COMMERCIAL

## 2018-06-14 ENCOUNTER — TRANSCRIBE ORDERS (OUTPATIENT)
Dept: LAB | Facility: HOSPITAL | Age: 78
End: 2018-06-14

## 2018-06-14 DIAGNOSIS — K80.10 CALCULUS OF GALLBLADDER WITH CHRONIC CHOLECYSTITIS WITHOUT OBSTRUCTION: ICD-10-CM

## 2018-06-14 DIAGNOSIS — K80.10 CALCULUS OF GALLBLADDER WITH CHRONIC CHOLECYSTITIS WITHOUT OBSTRUCTION: Primary | ICD-10-CM

## 2018-06-14 DIAGNOSIS — K44.9 DIAPHRAGMATIC HERNIA WITHOUT OBSTRUCTION OR GANGRENE: ICD-10-CM

## 2018-06-14 LAB
ERYTHROCYTE [DISTWIDTH] IN BLOOD BY AUTOMATED COUNT: 21.4 % (ref 11.7–14.4)
HCT VFR BLDCO AUTO: 31.7 % (ref 35–45)
HGB BLD-MCNC: 9.6 G/DL (ref 11.8–15.7)
MCH RBC QN AUTO: 22.6 PG (ref 28–33.2)
MCHC RBC AUTO-ENTMCNC: 30.3 G/DL (ref 32.2–35.5)
MCV RBC AUTO: 74.8 FL (ref 83–98)
PDW BLD AUTO: 9.8 FL (ref 9.4–12.3)
PLATELET # BLD AUTO: 259 K/UL (ref 150–369)
RBC # BLD AUTO: 4.24 M/UL (ref 3.93–5.22)
WBC # BLD AUTO: 3.65 K/UL (ref 3.8–10.5)

## 2018-06-14 PROCEDURE — 85027 COMPLETE CBC AUTOMATED: CPT

## 2018-06-14 PROCEDURE — 36415 COLL VENOUS BLD VENIPUNCTURE: CPT

## 2018-08-31 ENCOUNTER — TRANSCRIBE ORDERS (OUTPATIENT)
Dept: REGISTRATION | Facility: HOSPITAL | Age: 78
End: 2018-08-31

## 2018-08-31 DIAGNOSIS — D50.0 IRON DEFICIENCY ANEMIA DUE TO CHRONIC BLOOD LOSS: Primary | ICD-10-CM

## 2019-12-06 ENCOUNTER — TRANSCRIBE ORDERS (OUTPATIENT)
Dept: RADIOLOGY | Age: 79
End: 2019-12-06

## 2019-12-06 ENCOUNTER — HOSPITAL ENCOUNTER (OUTPATIENT)
Dept: RADIOLOGY | Age: 79
Discharge: HOME | End: 2019-12-06
Attending: FAMILY MEDICINE
Payer: COMMERCIAL

## 2019-12-06 DIAGNOSIS — Z12.31 ENCOUNTER FOR SCREENING MAMMOGRAM FOR MALIGNANT NEOPLASM OF BREAST: ICD-10-CM

## 2019-12-06 DIAGNOSIS — Z12.31 ENCOUNTER FOR SCREENING MAMMOGRAM FOR MALIGNANT NEOPLASM OF BREAST: Primary | ICD-10-CM

## 2019-12-06 PROCEDURE — 77063 BREAST TOMOSYNTHESIS BI: CPT

## 2020-04-08 ENCOUNTER — HOSPITAL ENCOUNTER (EMERGENCY)
Facility: HOSPITAL | Age: 80
Discharge: HOME | End: 2020-04-09
Attending: EMERGENCY MEDICINE
Payer: COMMERCIAL

## 2020-04-08 DIAGNOSIS — E04.1 THYROID NODULE: ICD-10-CM

## 2020-04-08 DIAGNOSIS — R07.9 CHEST PAIN DUE TO GERD: ICD-10-CM

## 2020-04-08 DIAGNOSIS — R07.89 ATYPICAL CHEST PAIN: Primary | ICD-10-CM

## 2020-04-08 DIAGNOSIS — K21.9 CHEST PAIN DUE TO GERD: ICD-10-CM

## 2020-04-08 DIAGNOSIS — R74.01 ELEVATED AST (SGOT): ICD-10-CM

## 2020-04-08 DIAGNOSIS — E87.1 HYPONATREMIA: ICD-10-CM

## 2020-04-08 PROCEDURE — 3E0337Z INTRODUCTION OF ELECTROLYTIC AND WATER BALANCE SUBSTANCE INTO PERIPHERAL VEIN, PERCUTANEOUS APPROACH: ICD-10-PCS | Performed by: EMERGENCY MEDICINE

## 2020-04-08 PROCEDURE — 84484 ASSAY OF TROPONIN QUANT: CPT

## 2020-04-08 PROCEDURE — 83690 ASSAY OF LIPASE: CPT | Performed by: PHYSICIAN ASSISTANT

## 2020-04-08 PROCEDURE — 85025 COMPLETE CBC W/AUTO DIFF WBC: CPT | Performed by: EMERGENCY MEDICINE

## 2020-04-08 PROCEDURE — 99285 EMERGENCY DEPT VISIT HI MDM: CPT | Mod: 25

## 2020-04-08 PROCEDURE — 80053 COMPREHEN METABOLIC PANEL: CPT | Performed by: EMERGENCY MEDICINE

## 2020-04-08 PROCEDURE — 84484 ASSAY OF TROPONIN QUANT: CPT | Performed by: EMERGENCY MEDICINE

## 2020-04-08 PROCEDURE — 93005 ELECTROCARDIOGRAM TRACING: CPT | Performed by: EMERGENCY MEDICINE

## 2020-04-08 PROCEDURE — 36415 COLL VENOUS BLD VENIPUNCTURE: CPT

## 2020-04-08 PROCEDURE — 85025 COMPLETE CBC W/AUTO DIFF WBC: CPT

## 2020-04-08 PROCEDURE — 93005 ELECTROCARDIOGRAM TRACING: CPT

## 2020-04-08 PROCEDURE — 80053 COMPREHEN METABOLIC PANEL: CPT

## 2020-04-08 PROCEDURE — 96360 HYDRATION IV INFUSION INIT: CPT | Mod: 59

## 2020-04-09 ENCOUNTER — APPOINTMENT (EMERGENCY)
Dept: RADIOLOGY | Facility: HOSPITAL | Age: 80
End: 2020-04-09
Payer: COMMERCIAL

## 2020-04-09 VITALS
WEIGHT: 170 LBS | TEMPERATURE: 97.8 F | HEART RATE: 86 BPM | HEIGHT: 67 IN | OXYGEN SATURATION: 98 % | SYSTOLIC BLOOD PRESSURE: 168 MMHG | DIASTOLIC BLOOD PRESSURE: 74 MMHG | BODY MASS INDEX: 26.68 KG/M2 | RESPIRATION RATE: 16 BRPM

## 2020-04-09 LAB
ALBUMIN SERPL-MCNC: 4.2 G/DL (ref 3.4–5)
ALP SERPL-CCNC: 72 IU/L (ref 35–126)
ALT SERPL-CCNC: 47 IU/L (ref 11–54)
ANION GAP SERPL CALC-SCNC: 11 MEQ/L (ref 3–15)
AST SERPL-CCNC: 86 IU/L (ref 15–41)
ATRIAL RATE: 85
BASOPHILS # BLD: 0.03 K/UL (ref 0.01–0.1)
BASOPHILS NFR BLD: 0.7 %
BILIRUB SERPL-MCNC: 1.1 MG/DL (ref 0.3–1.2)
BILIRUB UR QL STRIP.AUTO: NEGATIVE MG/DL
BUN SERPL-MCNC: 20 MG/DL (ref 8–20)
CALCIUM SERPL-MCNC: 8.9 MG/DL (ref 8.9–10.3)
CHLORIDE SERPL-SCNC: 97 MEQ/L (ref 98–109)
CLARITY UR REFRACT.AUTO: CLEAR
CO2 SERPL-SCNC: 23 MEQ/L (ref 22–32)
COLOR UR AUTO: YELLOW
CREAT SERPL-MCNC: 0.6 MG/DL (ref 0.6–1.1)
DIFFERENTIAL METHOD BLD: ABNORMAL
EOSINOPHIL # BLD: 0.15 K/UL (ref 0.04–0.36)
EOSINOPHIL NFR BLD: 3.4 %
ERYTHROCYTE [DISTWIDTH] IN BLOOD BY AUTOMATED COUNT: 12 % (ref 11.7–14.4)
GFR SERPL CREATININE-BSD FRML MDRD: >60 ML/MIN/1.73M*2
GLUCOSE SERPL-MCNC: 101 MG/DL (ref 70–99)
GLUCOSE UR STRIP.AUTO-MCNC: NEGATIVE MG/DL
HCT VFR BLDCO AUTO: 39.9 % (ref 35–45)
HGB BLD-MCNC: 13.4 G/DL (ref 11.8–15.7)
HGB UR QL STRIP.AUTO: NEGATIVE
IMM GRANULOCYTES # BLD AUTO: 0.01 K/UL (ref 0–0.08)
IMM GRANULOCYTES NFR BLD AUTO: 0.2 %
KETONES UR STRIP.AUTO-MCNC: NEGATIVE MG/DL
LEUKOCYTE ESTERASE UR QL STRIP.AUTO: NEGATIVE
LIPASE SERPL-CCNC: 36 U/L (ref 20–51)
LYMPHOCYTES # BLD: 2.09 K/UL (ref 1.2–3.5)
LYMPHOCYTES NFR BLD: 47.7 %
MCH RBC QN AUTO: 31.2 PG (ref 28–33.2)
MCHC RBC AUTO-ENTMCNC: 33.6 G/DL (ref 32.2–35.5)
MCV RBC AUTO: 92.8 FL (ref 83–98)
MONOCYTES # BLD: 0.5 K/UL (ref 0.28–0.8)
MONOCYTES NFR BLD: 11.4 %
NEUTROPHILS # BLD: 1.6 K/UL (ref 1.7–7)
NEUTS SEG NFR BLD: 36.6 %
NITRITE UR QL STRIP.AUTO: NEGATIVE
NRBC BLD-RTO: 0 %
P AXIS: 60
PDW BLD AUTO: 9.2 FL (ref 9.4–12.3)
PH UR STRIP.AUTO: 7 [PH]
PLATELET # BLD AUTO: 179 K/UL (ref 150–369)
POTASSIUM SERPL-SCNC: 3.3 MEQ/L (ref 3.6–5.1)
PR INTERVAL: 234
PROT SERPL-MCNC: 7.4 G/DL (ref 6–8.2)
PROT UR QL STRIP.AUTO: NEGATIVE
QRS DURATION: 114
QT INTERVAL: 382
QTC CALCULATION(BAZETT): 454
R AXIS: 20
RBC # BLD AUTO: 4.3 M/UL (ref 3.93–5.22)
SODIUM SERPL-SCNC: 131 MEQ/L (ref 136–144)
SP GR UR REFRACT.AUTO: 1.02
T WAVE AXIS: 44
TROPONIN I SERPL-MCNC: <0.02 NG/ML
TROPONIN I SERPL-MCNC: <0.02 NG/ML
UROBILINOGEN UR STRIP-ACNC: 0.2 EU/DL
VENTRICULAR RATE: 85
WBC # BLD AUTO: 4.38 K/UL (ref 3.8–10.5)

## 2020-04-09 PROCEDURE — 25000000 HC PHARMACY GENERAL: Performed by: PHYSICIAN ASSISTANT

## 2020-04-09 PROCEDURE — 36415 COLL VENOUS BLD VENIPUNCTURE: CPT | Performed by: PHYSICIAN ASSISTANT

## 2020-04-09 PROCEDURE — 63700000 HC SELF-ADMINISTRABLE DRUG: Performed by: PHYSICIAN ASSISTANT

## 2020-04-09 PROCEDURE — 81003 URINALYSIS AUTO W/O SCOPE: CPT | Performed by: PHYSICIAN ASSISTANT

## 2020-04-09 PROCEDURE — 84484 ASSAY OF TROPONIN QUANT: CPT | Performed by: PHYSICIAN ASSISTANT

## 2020-04-09 PROCEDURE — 74177 CT ABD & PELVIS W/CONTRAST: CPT

## 2020-04-09 PROCEDURE — 25800000 HC PHARMACY IV SOLUTIONS: Performed by: PHYSICIAN ASSISTANT

## 2020-04-09 PROCEDURE — 71260 CT THORAX DX C+: CPT

## 2020-04-09 PROCEDURE — 63600105 HC IODINE BASED CONTRAST: Performed by: PHYSICIAN ASSISTANT

## 2020-04-09 RX ORDER — ALUMINUM HYDROXIDE, MAGNESIUM HYDROXIDE, AND SIMETHICONE 1200; 120; 1200 MG/30ML; MG/30ML; MG/30ML
30 SUSPENSION ORAL ONCE
Status: COMPLETED | OUTPATIENT
Start: 2020-04-09 | End: 2020-04-09

## 2020-04-09 RX ORDER — LIDOCAINE HYDROCHLORIDE 20 MG/ML
5 SOLUTION OROPHARYNGEAL ONCE
Status: COMPLETED | OUTPATIENT
Start: 2020-04-09 | End: 2020-04-09

## 2020-04-09 RX ADMIN — ALUMINUM HYDROXIDE, MAGNESIUM HYDROXIDE, AND SIMETHICONE 30 ML: 200; 200; 20 SUSPENSION ORAL at 00:40

## 2020-04-09 RX ADMIN — IOHEXOL 115 ML: 300 INJECTION, SOLUTION INTRAVENOUS at 01:45

## 2020-04-09 RX ADMIN — LIDOCAINE HYDROCHLORIDE 5 ML: 20 SOLUTION ORAL; TOPICAL at 00:40

## 2020-04-09 RX ADMIN — SODIUM CHLORIDE 1000 ML: 9 INJECTION, SOLUTION INTRAVENOUS at 00:41

## 2020-04-09 ASSESSMENT — ENCOUNTER SYMPTOMS
FEVER: 0
COUGH: 0
NAUSEA: 0
ABDOMINAL PAIN: 1
DYSURIA: 0
SHORTNESS OF BREATH: 1
VOMITING: 0
CHILLS: 0
BACK PAIN: 0

## 2020-04-09 NOTE — ED ATTESTATION NOTE
Procedures  Physical Exam  Review of Systems    4/9/202011:57 AM  I have personally seen and examined the patient.  I reviewed and agree with the PA/NP/Resident's assessment and plan of care.    My examination, assessment, and plan of care of Nelly Meyer is as follows:    The patient presents with burning pain in the chest and abdomen starting in the upper part of the chest mostly on the right side going all the way down to near the inguinal area.  Said that this is a very intolerable burning sensation which came on about an hour prior to coming in while she was sleeping she thinks it woke her up from sleep.  There was no shortness of breath, no nausea, there was a little diaphoresis.  The severe pain lasted about 10 minutes but she was scared enough to call an ambulance.    Exam: Currently she feels much better especially after having received a GI cocktail which did took the remainder of the pain away.  Lungs are clear.  Heart is sinus without any murmur.  Abdomen is soft and nontender.    Impression/Plan: EKG and troponin are negative initially.  The troponin was repeated and additional 2-1/2 hours and was negative again.  CT scan of the chest the chest and abdomen were done with PE protocol and there was no pulmonary embolus or thoracic dissection that was found.  Patient did get better with a GI cocktail and has had a long history of reflux until she had surgery done 2 years ago.  Agree with PA management and discharge.    Vital signs have been ordered and reviewed. The oxygen saturation is normal.    This document was created using dragon dictation software.  There might be some typographical errors due to this technology.     Lobo Rae MD  04/09/20 1401

## 2020-04-09 NOTE — ED PROVIDER NOTES
HPI     Chief Complaint   Patient presents with   • Chest Pain       71-year-old female with history of hypertension, hyperlipidemia, GERD, cholecystectomy, etc, presents the emergency department complaining of chest & abdominal pain.  Patient reports he began a little after 10 PM tonight and lasted 20 minutes.  Describes the pain as burning from her throat down through her abdomen.  Patient reports associated shortness of breath and diaphoresis.  All the symptoms have subsided, except for the abdominal pain which remains in the upper abdomen.  Denies nausea, vomiting, diarrhea, urinary symptoms, fevers, chills, cough.  Patient has never had this before.  Patient did not take anything for the discomfort.  Patient follows with cardiology, Dr. Miguel.  Patient's last stress test was 2 years ago           Patient History     Past Medical History:   Diagnosis Date   • Anemia     iron def.   • Basal cell carcinoma     nose , Moh's surgery   • Constipation    • Gallstones    • GERD (gastroesophageal reflux disease)    • Hiatal hernia    • Hypertension    • Kidney stone     found by ultrasound, no problem presently   • Lipid disorder    • Osteoporosis    • Postmenopausal        Past Surgical History:   Procedure Laterality Date   • BREAST SURGERY      right breast segmental resection   • COLONOSCOPY     • ESOPHAGOGASTRODUODENOSCOPY     • MOHS SURGERY      nose   • ORIF ANKLE FRACTURE BIMALLEOLAR Left    • TEAR DUCT SURGERY Right     tear duct bypass       History reviewed. No pertinent family history.    Social History     Tobacco Use   • Smoking status: Former Smoker   • Smokeless tobacco: Never Used   Substance Use Topics   • Alcohol use: Yes     Alcohol/week: 7.0 standard drinks     Types: 7 Glasses of wine per week     Comment: occasional   • Drug use: No       Systems Reviewed from Nursing Triage:  Tobacco  Allergies  Meds  Problems  Med Hx  Surg Hx  Soc Hx         Review of Systems     Review of Systems  "  Constitutional: Negative for chills and fever.   Respiratory: Positive for shortness of breath (resolved). Negative for cough.    Cardiovascular: Positive for chest pain (resolved). Negative for leg swelling.   Gastrointestinal: Positive for abdominal pain. Negative for nausea and vomiting.   Genitourinary: Negative for dysuria.   Musculoskeletal: Negative for back pain.        Physical Exam     ED Triage Vitals [04/08/20 2347]   Temp Heart Rate Resp BP SpO2   36.6 °C (97.8 °F) 86 18 (!) 230/95 97 %      Temp Source Heart Rate Source Patient Position BP Location FiO2 (%) (Set)   Temporal Monitor Lying Right upper arm --       Pulse Ox %: 97 % (04/08/20 2357)  Pulse Ox Interpretation: Normal (04/08/20 2357)  Heart Rate: 85 (04/08/20 2357)  Rhythm Strip Interpretation: Normal Sinus Rhythm (04/08/20 2357)    Patient Vitals for the past 24 hrs:   BP Temp Temp src Pulse Resp SpO2 Height Weight   04/09/20 0243 (!) 168/74 -- -- 86 16 98 % -- --   04/09/20 0206 (!) 172/77 -- -- 88 18 98 % -- --   04/09/20 0045 (!) 157/70 -- -- -- 16 96 % -- --   04/08/20 2347 (!) 230/95 36.6 °C (97.8 °F) Temporal 86 18 97 % 1.702 m (5' 7\") 77.1 kg (170 lb)                                       Physical Exam   Constitutional: She is oriented to person, place, and time. She appears well-developed and well-nourished. No distress.   Cardiovascular: Normal rate and regular rhythm.   Murmur (Pt reports heart murmur as child) heard.  Pulmonary/Chest: Effort normal and breath sounds normal. No respiratory distress.   Abdominal: Soft. She exhibits no distension. There is tenderness (mild) in the right upper quadrant, epigastric area, periumbilical area and left upper quadrant.   Musculoskeletal: Normal range of motion. She exhibits no edema.   Neurological: She is alert and oriented to person, place, and time.   Skin: Skin is warm and dry.   Psychiatric: She has a normal mood and affect. Her behavior is normal.   Nursing note and vitals " reviewed.           Procedures    ED Course & MDM     Labs Reviewed   CBC AND DIFF - Abnormal       Result Value    WBC 4.38      RBC 4.30      Hemoglobin 13.4      Hematocrit 39.9      MCV 92.8      MCH 31.2      MCHC 33.6      RDW 12.0      Platelets 179      MPV 9.2 (*)     Differential Type Auto      nRBC 0.0      Immature Granulocytes 0.2      Neutrophils 36.6      Lymphocytes 47.7      Monocytes 11.4      Eosinophils 3.4      Basophils 0.7      Immature Granulocytes, Absolute 0.01      Neutrophils, Absolute 1.60 (*)     Lymphocytes, Absolute 2.09      Monocytes, Absolute 0.50      Eosinophils, Absolute 0.15      Basophils, Absolute 0.03     COMPREHENSIVE METABOLIC PANEL - Abnormal    Sodium 131 (*)     Potassium 3.3 (*)     Chloride 97 (*)     CO2 23      BUN 20      Creatinine 0.6      Glucose 101 (*)     Calcium 8.9      AST (SGOT) 86 (*)     ALT (SGPT) 47      Alkaline Phosphatase 72      Total Protein 7.4      Albumin 4.2      Bilirubin, Total 1.1      eGFR >60.0      Anion Gap 11     TROPONIN I - Normal    Troponin I <0.02     LIPASE - Normal    Lipase 36     UA REFLEX CULTURE (MACROSCOPIC) - Normal    Color, Urine Yellow      Clarity, Urine Clear      Specific Gravity, Urine 1.016      pH, Urine 7.0      Leukocyte Esterase Negative      Nitrite, Urine Negative      Protein, Urine Negative      Glucose, Urine Negative      Ketones, Urine Negative      Urobilinogen, Urine 0.2      Bilirubin, Urine Negative      Blood, Urine Negative     TROPONIN I - Normal    Troponin I <0.02     URINALYSIS REFLEX CULTURE (ED AND OUTPATIENT ONLY)    Narrative:     The following orders were created for panel order Urinalysis w/ reflex culture.  Procedure                               Abnormality         Status                     ---------                               -----------         ------                     UA Reflex to Culture (Mac...[[id number]]  Normal              Final result                 Please view results  for these tests on the individual orders.   RAINBOW DRAW PANEL    Narrative:     The following orders were created for panel order [place] Draw Panel.  Procedure                               Abnormality         Status                     ---------                               -----------         ------                     RAINBOW RED[[id number]]                                       In process                 RAINBOW LT BLUE[[id number]]                                   In process                 RAINBOW GOLD[[id number]]                                      In process                   Please view results for these tests on the individual orders.   RAINBOW RED   RAINBOW LT BLUE   RAINBOW GOLD       CT CHEST PULMONARY EMBOLISM WITH IV CONTRAST   Final Result   IMPRESSION:      1.  No pulmonary embolism.  No acute pulmonary process.   2.  No acute abnormality of the abdomen or pelvis.   3.  There is a 2 cm nodule left lobe of the thyroid gland.  Recommend further   evaluation  with ultrasound.                           CT ABDOMEN PELVIS WITH IV CONTRAST   Final Result   IMPRESSION:      1.  No pulmonary embolism.  No acute pulmonary process.   2.  No acute abnormality of the abdomen or pelvis.   3.  There is a 2 cm nodule left lobe of the thyroid gland.  Recommend further   evaluation  with ultrasound.                           ECG 12 lead   ED Interpretation   Sinus rhythm with first-degree heart block and occasional PVC   85bpm   Long SUSAN   QRS within normal limits   Normal axis   No ST elevation or depression   Reviewed by attending and myself                        [facility]         ED Course as of Apr 09 0341   Thu Apr 09, 2020   0104 Impression: Chest pain, abdominal painPlan: Labs, EKG, fluids, GI cocktail, CT chest abdomen pelvis, observe    [MG]   0116 WNL   Troponin I: <0.02 [MG]   0116 Low - pt getting fluids   Sodium(!): 131 [MG]   0116 Mildly elevated   AST (SGOT)(!): 86 [MG]   0207 IMPRESSION:     1.  No pulmonary  embolism.  No acute pulmonary process.  2.  No acute abnormality of the abdomen or pelvis.  3.  There is a 2 cm nodule left lobe of the thyroid gland.  Recommend further  evaluation  with ultrasound.      CT ABDOMEN PELVIS WITH IV CONTRAST [MG]   0226 UA neg      [MG]   0226 ReEval:  Pt resting comfortably in bed.  Pt reports discomfort has completely subsided after the GI cocktail  Case d/w Dr Butch Mendoza order second Trop    [MG]   0331 Repeat Trop WNL   Troponin I: <0.02 [MG]   0341 Pt evaluated by maria teresa Green d/c pt home    [MG]      ED Course User Index  [MG] Milena Pizarro PA C         Clinical Impressions as of Apr 09 0341   Atypical chest pain   Elevated AST (SGOT)   Hyponatremia   Chest pain due to GERD   Thyroid nodule        Milena Pizarro PA C  04/09/20 0341

## 2020-09-02 ENCOUNTER — TRANSCRIBE ORDERS (OUTPATIENT)
Dept: SCHEDULING | Age: 80
End: 2020-09-02

## 2020-09-02 DIAGNOSIS — E04.1 NONTOXIC SINGLE THYROID NODULE: Primary | ICD-10-CM

## 2020-09-08 ENCOUNTER — HOSPITAL ENCOUNTER (OUTPATIENT)
Dept: RADIOLOGY | Facility: HOSPITAL | Age: 80
Discharge: HOME | End: 2020-09-08
Attending: FAMILY MEDICINE
Payer: COMMERCIAL

## 2020-09-08 DIAGNOSIS — E04.1 NONTOXIC SINGLE THYROID NODULE: ICD-10-CM

## 2020-09-08 PROCEDURE — 76536 US EXAM OF HEAD AND NECK: CPT

## 2020-10-01 ENCOUNTER — HOSPITAL ENCOUNTER (OUTPATIENT)
Dept: RADIOLOGY | Facility: HOSPITAL | Age: 80
Discharge: HOME | End: 2020-10-01
Attending: FAMILY MEDICINE | Admitting: RADIOLOGY
Payer: COMMERCIAL

## 2020-10-01 VITALS
DIASTOLIC BLOOD PRESSURE: 70 MMHG | RESPIRATION RATE: 18 BRPM | SYSTOLIC BLOOD PRESSURE: 160 MMHG | OXYGEN SATURATION: 95 % | HEART RATE: 74 BPM

## 2020-10-01 DIAGNOSIS — E04.1 NONTOXIC UNINODULAR GOITER: ICD-10-CM

## 2020-10-01 LAB
CASE RPRT: NORMAL
CLINICAL INFO: NORMAL
PATH REPORT.FINAL DX SPEC: NORMAL
PATH REPORT.GROSS SPEC: NORMAL

## 2020-10-01 PROCEDURE — 88173 CYTOPATH EVAL FNA REPORT: CPT | Performed by: FAMILY MEDICINE

## 2020-10-01 PROCEDURE — BG44ZZZ ULTRASONOGRAPHY OF THYROID GLAND: ICD-10-PCS | Performed by: RADIOLOGY

## 2020-10-01 PROCEDURE — 0G9G3ZX DRAINAGE OF LEFT THYROID GLAND LOBE, PERCUTANEOUS APPROACH, DIAGNOSTIC: ICD-10-PCS | Performed by: RADIOLOGY

## 2020-10-01 PROCEDURE — 25000000 HC PHARMACY GENERAL: Performed by: RADIOLOGY

## 2020-10-01 PROCEDURE — 76942 ECHO GUIDE FOR BIOPSY: CPT

## 2020-10-01 RX ORDER — LIDOCAINE HYDROCHLORIDE 10 MG/ML
INJECTION, SOLUTION INFILTRATION; PERINEURAL
Status: COMPLETED | OUTPATIENT
Start: 2020-10-01 | End: 2020-10-01

## 2020-10-01 RX ADMIN — LIDOCAINE HYDROCHLORIDE 3 ML: 10 INJECTION, SOLUTION INFILTRATION; PERINEURAL at 08:26

## 2020-10-01 NOTE — Clinical Note
Patient placed on procedure table in supine position with arms at side. Positioning devices: all pressure points padded.

## 2020-10-01 NOTE — DISCHARGE INSTRUCTIONS
"Thyroid Biopsy    DISCHARGE INSTRUCTIONS  You have had a biopsy of your thyroid.  You may resume your normal diet.  You may resume your normal medications.   You may resume normal activity.    Biopsy results can be confusing. Please note that a Bagley 1 (\"nondiagnostic or insufficient specimen) result from pathology/cytology does not indicate an inadequate procedure - but rather a low risk for malignancy. Based on our recent institutional review and literature review, we consider the following risk assessment and management recommendation with each Bagley category:     - Bagley 1 (\"Nondiagnostic or insufficient specimen\") - low risk of malignancy, < 2.5%. Repeat biopsy is not required and should not be performed for 3 months.  - Bagley 2 (\"Benign\") - low risk of malignancy, < 4%  - Bagley 3 (\"Atypia or Indeterminate\") - malignancy rate poorly defined, 5-90%; requires further risk assessment with Affirma genetic analysis  - Bagley 4 or 5 - high risk of malignancy; please follow-up with your referring physician for treatment      It is normal to experience some pain at the site over the next 2 days. You may ice the area and/or take Tylenol for that pain    Notify your primary physician and/or Interventional Radiologist IMMEDIATELY if any of the following occur:  · Fever of 101° F (38 ° C) or chills  · Swelling and or redness in the area of the puncture site  · New onset of neck pain or swelling  · Lightheadedness or dizziness upon standing    Physician Contact Information  • If you have a problem that you believe requires immediate attention, you should go to the Emergency Room, either at [facility] or the closest hospital. If you believe that your problem can safely wait until you speak to a physician, you should contact your doctor or Interventional Radiologist.   • It is usually easier to contact your own physician by calling the office, or after hours through the answering service. If you do not " know the number, the hospital  can connect you by calling [phone].   • If you have concerns that need to be answered by the Interventional Radiologist, you can reach him/ her as follows:   o During regular weekday hours (8AM to 5PM), call [phone] and ask the technologist to connect you with the Interventional Radiologist.   o During off hours for emergencies call [phone] and ask the hospital  to contact the Interventional Radiologist on-call.

## 2020-10-01 NOTE — POST-PROCEDURE NOTE
Interventional Radiology Brief Postprocedure Note    Nelly Meyer     Attending: Niurka    Diagnosis: Thyroid nodules     Description of procedure: U/S guided FNA of left mid-pole 2.6 cm nodule     Contrast: None     Anesthesia:  Local    Medications: None     Complications: None      Estimated Blood Loss: Estimated Blood Loss: 0-10 ml    Specimens: FNA of left mid-pole thyroid nodule    Findings: Technically successful u/s guided FNA of left mid-pole thyroid nodule.       10/1/2020 8:38 AM

## 2020-11-18 ENCOUNTER — TRANSCRIBE ORDERS (OUTPATIENT)
Dept: SCHEDULING | Age: 80
End: 2020-11-18

## 2020-11-18 DIAGNOSIS — Z12.31 ENCOUNTER FOR SCREENING MAMMOGRAM FOR MALIGNANT NEOPLASM OF BREAST: Primary | ICD-10-CM

## 2020-12-15 ENCOUNTER — HOSPITAL ENCOUNTER (OUTPATIENT)
Dept: RADIOLOGY | Age: 80
Discharge: HOME | End: 2020-12-15
Attending: FAMILY MEDICINE
Payer: COMMERCIAL

## 2020-12-15 DIAGNOSIS — Z12.31 ENCOUNTER FOR SCREENING MAMMOGRAM FOR MALIGNANT NEOPLASM OF BREAST: ICD-10-CM

## 2020-12-15 PROCEDURE — 77067 SCR MAMMO BI INCL CAD: CPT

## 2021-02-10 ENCOUNTER — IMMUNIZATION (OUTPATIENT)
Dept: IMMUNIZATION | Facility: CLINIC | Age: 81
End: 2021-02-10

## 2021-03-11 ENCOUNTER — IMMUNIZATION (OUTPATIENT)
Dept: IMMUNIZATION | Facility: CLINIC | Age: 81
End: 2021-03-11
Attending: FAMILY MEDICINE

## 2021-08-11 ENCOUNTER — HOSPITAL ENCOUNTER (EMERGENCY)
Dept: HOSPITAL 74 - JER | Age: 81
Discharge: LEFT BEFORE DISCHARGE | End: 2021-08-11
Payer: COMMERCIAL

## 2021-08-11 VITALS — DIASTOLIC BLOOD PRESSURE: 65 MMHG | HEART RATE: 77 BPM | SYSTOLIC BLOOD PRESSURE: 143 MMHG | TEMPERATURE: 97.4 F

## 2021-08-11 VITALS — BODY MASS INDEX: 25.9 KG/M2

## 2021-08-11 DIAGNOSIS — R55: Primary | ICD-10-CM
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
## 2021-08-11 DIAGNOSIS — S93.401A: ICD-10-CM

## 2021-08-11 LAB
ALBUMIN SERPL-MCNC: 4.2 G/DL (ref 3.4–5)
ALP SERPL-CCNC: 80 U/L (ref 45–117)
ALT SERPL-CCNC: 34 U/L (ref 13–61)
ANION GAP SERPL CALC-SCNC: 8 MMOL/L (ref 8–16)
AST SERPL-CCNC: 24 U/L (ref 15–37)
BASOPHILS # BLD: 0.5 % (ref 0–2)
BILIRUB SERPL-MCNC: 1.1 MG/DL (ref 0.2–1)
BUN SERPL-MCNC: 13.9 MG/DL (ref 7–18)
CALCIUM SERPL-MCNC: 9.2 MG/DL (ref 8.5–10.1)
CHLORIDE SERPL-SCNC: 102 MMOL/L (ref 98–107)
CO2 SERPL-SCNC: 27 MMOL/L (ref 21–32)
CREAT SERPL-MCNC: 0.7 MG/DL (ref 0.55–1.3)
DEPRECATED RDW RBC AUTO: 13 % (ref 11.6–15.6)
EOSINOPHIL # BLD: 1.3 % (ref 0–4.5)
GLUCOSE SERPL-MCNC: 98 MG/DL (ref 74–106)
HCT VFR BLD CALC: 39.8 % (ref 32.4–45.2)
HGB BLD-MCNC: 13.8 GM/DL (ref 10.7–15.3)
LYMPHOCYTES # BLD: 14.7 % (ref 8–40)
MAGNESIUM SERPL-MCNC: 2 MG/DL (ref 1.8–2.4)
MCH RBC QN AUTO: 31.6 PG (ref 25.7–33.7)
MCHC RBC AUTO-ENTMCNC: 34.7 G/DL (ref 32–36)
MCV RBC: 91.2 FL (ref 80–96)
MONOCYTES # BLD AUTO: 8 % (ref 3.8–10.2)
NEUTROPHILS # BLD: 75.5 % (ref 42.8–82.8)
PLATELET # BLD AUTO: 200 10^3/UL (ref 134–434)
PMV BLD: 7.2 FL (ref 7.5–11.1)
PROT SERPL-MCNC: 7.4 G/DL (ref 6.4–8.2)
RBC # BLD AUTO: 4.36 M/MM3 (ref 3.6–5.2)
SODIUM SERPL-SCNC: 136 MMOL/L (ref 136–145)
WBC # BLD AUTO: 7.2 K/MM3 (ref 4–10)

## 2021-11-11 ENCOUNTER — IMMUNIZATION (OUTPATIENT)
Dept: IMMUNIZATION | Facility: CLINIC | Age: 81
End: 2021-11-11
Payer: COMMERCIAL

## 2021-11-11 PROCEDURE — 91306 COVID-19 (SARS-COV-2) VACCINE, MODERNA: CPT | Performed by: FAMILY MEDICINE

## 2021-11-11 PROCEDURE — 0064A COVID-19 (SARS-COV-2) VACCINE, MODERNA: CPT | Performed by: FAMILY MEDICINE

## 2021-11-22 NOTE — ANESTHESIA PROCEDURE NOTES
Airway  Urgency: elective    Start Time: 4/5/2018 2:20 PM  Airway not difficult    General Information and Staff    Patient location during procedure: OR  Anesthesiologist: SALOME CULLEN  Resident/CRNA: PEPE VÁZQUEZ  Performed: anesthesiologist and resident/CRNA     Indications and Patient Condition  Indications for airway management: anesthesia  Sedation level: deep  Preoxygenated: yes  Patient position: sniffing  MILS not maintained throughout  Mask difficulty assessment: 1 - vent by mask    Final Airway Details  Final airway type: endotracheal airway      Successful airway: ETT  Cuffed: yes   Successful intubation technique: direct laryngoscopy  Facilitating devices/methods: intubating stylet  Endotracheal tube insertion site: oral  Blade: Kelly  Blade size: #3  ETT size: 7.0 mm  Cormack-Lehane Classification: grade IIa - partial view of glottis  Placement verified by: chest auscultation and capnometry   Measured from: lips  ETT to lips (cm): 22  Number of attempts at approach: 1  Number of other approaches attempted: 0            
Yes

## 2021-11-23 ENCOUNTER — TRANSCRIBE ORDERS (OUTPATIENT)
Dept: SCHEDULING | Age: 81
End: 2021-11-23
Payer: COMMERCIAL

## 2021-11-23 DIAGNOSIS — E04.2 NONTOXIC MULTINODULAR GOITER: Primary | ICD-10-CM

## 2022-01-06 ENCOUNTER — HOSPITAL ENCOUNTER (OUTPATIENT)
Dept: RADIOLOGY | Facility: HOSPITAL | Age: 82
Discharge: HOME | End: 2022-01-06
Attending: INTERNAL MEDICINE
Payer: COMMERCIAL

## 2022-01-06 DIAGNOSIS — E04.2 NONTOXIC MULTINODULAR GOITER: ICD-10-CM

## 2022-01-06 PROCEDURE — 76536 US EXAM OF HEAD AND NECK: CPT

## 2022-06-21 ENCOUNTER — TRANSCRIBE ORDERS (OUTPATIENT)
Dept: SCHEDULING | Age: 82
End: 2022-06-21

## 2022-06-21 DIAGNOSIS — Z12.31 ENCOUNTER FOR SCREENING MAMMOGRAM FOR MALIGNANT NEOPLASM OF BREAST: Primary | ICD-10-CM

## 2022-06-24 ENCOUNTER — HOSPITAL ENCOUNTER (OUTPATIENT)
Dept: RADIOLOGY | Age: 82
Discharge: HOME | End: 2022-06-24
Attending: FAMILY MEDICINE
Payer: COMMERCIAL

## 2022-06-24 DIAGNOSIS — Z12.31 ENCOUNTER FOR SCREENING MAMMOGRAM FOR MALIGNANT NEOPLASM OF BREAST: ICD-10-CM

## 2022-06-24 PROCEDURE — 77063 BREAST TOMOSYNTHESIS BI: CPT

## 2022-07-19 ENCOUNTER — HOSPITAL ENCOUNTER (OUTPATIENT)
Dept: RADIOLOGY | Age: 82
Discharge: HOME | End: 2022-07-19
Attending: RADIOLOGY
Payer: COMMERCIAL

## 2022-07-19 DIAGNOSIS — R92.8 ABNORMAL MAMMOGRAM: ICD-10-CM

## 2022-07-19 PROCEDURE — 76642 ULTRASOUND BREAST LIMITED: CPT | Mod: LT

## 2022-12-23 ENCOUNTER — TRANSCRIBE ORDERS (OUTPATIENT)
Dept: SCHEDULING | Age: 82
End: 2022-12-23

## 2022-12-23 DIAGNOSIS — R92.8 OTHER ABNORMAL AND INCONCLUSIVE FINDINGS ON DIAGNOSTIC IMAGING OF BREAST: Primary | ICD-10-CM

## 2022-12-23 DIAGNOSIS — E04.2 NONTOXIC MULTINODULAR GOITER: Primary | ICD-10-CM

## 2023-01-10 ENCOUNTER — HOSPITAL ENCOUNTER (OUTPATIENT)
Dept: RADIOLOGY | Facility: HOSPITAL | Age: 83
Discharge: HOME | End: 2023-01-10
Attending: INTERNAL MEDICINE
Payer: COMMERCIAL

## 2023-01-10 DIAGNOSIS — E04.2 NONTOXIC MULTINODULAR GOITER: ICD-10-CM

## 2023-01-10 PROCEDURE — 76536 US EXAM OF HEAD AND NECK: CPT

## 2023-01-25 ENCOUNTER — HOSPITAL ENCOUNTER (OUTPATIENT)
Dept: RADIOLOGY | Age: 83
Discharge: HOME | End: 2023-01-25
Attending: FAMILY MEDICINE
Payer: COMMERCIAL

## 2023-01-25 DIAGNOSIS — R92.8 OTHER ABNORMAL AND INCONCLUSIVE FINDINGS ON DIAGNOSTIC IMAGING OF BREAST: ICD-10-CM

## 2023-01-25 PROCEDURE — 77065 DX MAMMO INCL CAD UNI: CPT | Mod: LT

## 2023-01-25 PROCEDURE — 76642 ULTRASOUND BREAST LIMITED: CPT | Mod: LT

## 2023-05-21 ENCOUNTER — APPOINTMENT (EMERGENCY)
Dept: RADIOLOGY | Facility: HOSPITAL | Age: 83
DRG: 543 | End: 2023-05-21
Payer: COMMERCIAL

## 2023-05-21 ENCOUNTER — HOSPITAL ENCOUNTER (INPATIENT)
Facility: HOSPITAL | Age: 83
LOS: 1 days | Discharge: HOME HEALTH | DRG: 543 | End: 2023-05-23
Attending: EMERGENCY MEDICINE | Admitting: HOSPITALIST
Payer: COMMERCIAL

## 2023-05-21 DIAGNOSIS — R55 NEAR SYNCOPE: ICD-10-CM

## 2023-05-21 DIAGNOSIS — I10 HYPERTENSION, UNSPECIFIED TYPE: ICD-10-CM
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
## 2023-05-21 DIAGNOSIS — S22.080A T12 COMPRESSION FRACTURE, INITIAL ENCOUNTER (CMS/HCC): Primary | ICD-10-CM

## 2023-05-21 DIAGNOSIS — I72.5 BASILAR ARTERY ANEURYSM (CMS/HCC): ICD-10-CM

## 2023-05-21 PROBLEM — I72.9 ANEURYSM (CMS/HCC): Status: ACTIVE | Noted: 2023-05-21

## 2023-05-21 PROBLEM — R26.2 AMBULATORY DYSFUNCTION: Status: ACTIVE | Noted: 2023-05-21

## 2023-05-21 PROBLEM — E87.1 HYPONATREMIA: Status: ACTIVE | Noted: 2023-05-21

## 2023-05-21 PROBLEM — E78.5 HYPERLIPIDEMIA: Status: ACTIVE | Noted: 2023-05-21

## 2023-05-21 PROBLEM — E04.1 THYROID NODULE: Status: ACTIVE | Noted: 2023-05-21

## 2023-05-21 LAB
ALBUMIN SERPL-MCNC: 4.6 G/DL (ref 3.4–5)
ALP SERPL-CCNC: 69 IU/L (ref 35–126)
ALT SERPL-CCNC: 34 IU/L (ref 11–54)
ANION GAP SERPL CALC-SCNC: 9 MEQ/L (ref 3–15)
AST SERPL-CCNC: 36 IU/L (ref 15–41)
BASOPHILS # BLD: 0.02 K/UL (ref 0.01–0.1)
BASOPHILS NFR BLD: 0.2 %
BILIRUB SERPL-MCNC: 1.4 MG/DL (ref 0.3–1.2)
BUN SERPL-MCNC: 20 MG/DL (ref 8–20)
CALCIUM SERPL-MCNC: 9.8 MG/DL (ref 8.9–10.3)
CHLORIDE SERPL-SCNC: 98 MEQ/L (ref 98–109)
CO2 SERPL-SCNC: 25 MEQ/L (ref 22–32)
CREAT SERPL-MCNC: 0.6 MG/DL (ref 0.6–1.1)
DIFFERENTIAL METHOD BLD: ABNORMAL
EOSINOPHIL # BLD: 0.01 K/UL (ref 0.04–0.36)
EOSINOPHIL NFR BLD: 0.1 %
ERYTHROCYTE [DISTWIDTH] IN BLOOD BY AUTOMATED COUNT: 12.3 % (ref 11.7–14.4)
GFR SERPL CREATININE-BSD FRML MDRD: >60 ML/MIN/1.73M*2
GLUCOSE SERPL-MCNC: 118 MG/DL (ref 70–99)
HCT VFR BLDCO AUTO: 41.2 % (ref 35–45)
HGB BLD-MCNC: 14.1 G/DL (ref 11.8–15.7)
IMM GRANULOCYTES # BLD AUTO: 0.06 K/UL (ref 0–0.08)
IMM GRANULOCYTES NFR BLD AUTO: 0.6 %
LYMPHOCYTES # BLD: 0.62 K/UL (ref 1.2–3.5)
LYMPHOCYTES NFR BLD: 6.4 %
MCH RBC QN AUTO: 31.3 PG (ref 28–33.2)
MCHC RBC AUTO-ENTMCNC: 34.2 G/DL (ref 32.2–35.5)
MCV RBC AUTO: 91.4 FL (ref 83–98)
MONOCYTES # BLD: 0.58 K/UL (ref 0.28–0.8)
MONOCYTES NFR BLD: 6 %
NEUTROPHILS # BLD: 8.36 K/UL (ref 1.7–7)
NEUTS SEG NFR BLD: 86.7 %
NRBC BLD-RTO: 0 %
OSMOLALITY SERPL: 283 MOSM/KG (ref 280–300)
OSMOLALITY UR: 572 MOSM/KG (ref 100–1400)
PDW BLD AUTO: 9.4 FL (ref 9.4–12.3)
PLATELET # BLD AUTO: 212 K/UL (ref 150–369)
POTASSIUM SERPL-SCNC: 3.7 MEQ/L (ref 3.6–5.1)
PROT SERPL-MCNC: 8.1 G/DL (ref 6–8.2)
RBC # BLD AUTO: 4.51 M/UL (ref 3.93–5.22)
SODIUM SERPL-SCNC: 132 MEQ/L (ref 136–144)
WBC # BLD AUTO: 9.65 K/UL (ref 3.8–10.5)

## 2023-05-21 PROCEDURE — 80053 COMPREHEN METABOLIC PANEL: CPT | Performed by: PHYSICIAN ASSISTANT

## 2023-05-21 PROCEDURE — 63600000 HC DRUGS/DETAIL CODE

## 2023-05-21 PROCEDURE — 72100 X-RAY EXAM L-S SPINE 2/3 VWS: CPT

## 2023-05-21 PROCEDURE — 96372 THER/PROPH/DIAG INJ SC/IM: CPT | Mod: 59 | Performed by: HOSPITALIST

## 2023-05-21 PROCEDURE — 93005 ELECTROCARDIOGRAM TRACING: CPT | Performed by: HOSPITALIST

## 2023-05-21 PROCEDURE — G0378 HOSPITAL OBSERVATION PER HR: HCPCS

## 2023-05-21 PROCEDURE — 63700000 HC SELF-ADMINISTRABLE DRUG: Performed by: PHYSICIAN ASSISTANT

## 2023-05-21 PROCEDURE — G1004 CDSM NDSC: HCPCS

## 2023-05-21 PROCEDURE — 72070 X-RAY EXAM THORAC SPINE 2VWS: CPT

## 2023-05-21 PROCEDURE — 83935 ASSAY OF URINE OSMOLALITY: CPT

## 2023-05-21 PROCEDURE — 72125 CT NECK SPINE W/O DYE: CPT | Mod: ME

## 2023-05-21 PROCEDURE — 70498 CT ANGIOGRAPHY NECK: CPT | Mod: MG

## 2023-05-21 PROCEDURE — 36415 COLL VENOUS BLD VENIPUNCTURE: CPT | Performed by: EMERGENCY MEDICINE

## 2023-05-21 PROCEDURE — 63700000 HC SELF-ADMINISTRABLE DRUG: Performed by: HOSPITALIST

## 2023-05-21 PROCEDURE — 99223 1ST HOSP IP/OBS HIGH 75: CPT | Performed by: HOSPITALIST

## 2023-05-21 PROCEDURE — 63700000 HC SELF-ADMINISTRABLE DRUG

## 2023-05-21 PROCEDURE — 85025 COMPLETE CBC W/AUTO DIFF WBC: CPT | Performed by: PHYSICIAN ASSISTANT

## 2023-05-21 PROCEDURE — 83930 ASSAY OF BLOOD OSMOLALITY: CPT

## 2023-05-21 PROCEDURE — 70450 CT HEAD/BRAIN W/O DYE: CPT | Mod: ME

## 2023-05-21 PROCEDURE — 99285 EMERGENCY DEPT VISIT HI MDM: CPT | Mod: 25

## 2023-05-21 PROCEDURE — 63600105 HC IODINE BASED CONTRAST: Performed by: PHYSICIAN ASSISTANT

## 2023-05-21 PROCEDURE — 70496 CT ANGIOGRAPHY HEAD: CPT | Mod: MG

## 2023-05-21 RX ORDER — SODIUM CHLORIDE 9 MG/ML
INJECTION, SOLUTION INTRAVENOUS CONTINUOUS
Status: DISCONTINUED | OUTPATIENT
Start: 2023-05-21 | End: 2023-05-21

## 2023-05-21 RX ORDER — VIT C/E/ZN/COPPR/LUTEIN/ZEAXAN 250MG-90MG
1000 CAPSULE ORAL NIGHTLY
COMMUNITY
Start: 2018-01-01

## 2023-05-21 RX ORDER — DOCUSATE SODIUM 100 MG/1
100 CAPSULE, LIQUID FILLED ORAL 2 TIMES DAILY
Status: DISCONTINUED | OUTPATIENT
Start: 2023-05-21 | End: 2023-05-23 | Stop reason: HOSPADM

## 2023-05-21 RX ORDER — AMLODIPINE BESYLATE 5 MG/1
5 TABLET ORAL NIGHTLY
Status: DISCONTINUED | OUTPATIENT
Start: 2023-05-21 | End: 2023-05-23 | Stop reason: HOSPADM

## 2023-05-21 RX ORDER — IBUPROFEN 200 MG
16-32 TABLET ORAL AS NEEDED
Status: DISCONTINUED | OUTPATIENT
Start: 2023-05-21 | End: 2023-05-23 | Stop reason: HOSPADM

## 2023-05-21 RX ORDER — ONDANSETRON HYDROCHLORIDE 2 MG/ML
4 INJECTION, SOLUTION INTRAVENOUS EVERY 8 HOURS PRN
Status: DISCONTINUED | OUTPATIENT
Start: 2023-05-21 | End: 2023-05-23 | Stop reason: HOSPADM

## 2023-05-21 RX ORDER — POTASSIUM CHLORIDE 14.9 MG/ML
20 INJECTION INTRAVENOUS AS NEEDED
Status: DISCONTINUED | OUTPATIENT
Start: 2023-05-21 | End: 2023-05-23 | Stop reason: HOSPADM

## 2023-05-21 RX ORDER — DEXTROSE 50 % IN WATER (D50W) INTRAVENOUS SYRINGE
25 AS NEEDED
Status: DISCONTINUED | OUTPATIENT
Start: 2023-05-21 | End: 2023-05-23 | Stop reason: HOSPADM

## 2023-05-21 RX ORDER — ACETAMINOPHEN 325 MG/1
650 TABLET ORAL EVERY 4 HOURS PRN
Status: DISCONTINUED | OUTPATIENT
Start: 2023-05-21 | End: 2023-05-21

## 2023-05-21 RX ORDER — DEXTROSE 40 %
15-30 GEL (GRAM) ORAL AS NEEDED
Status: DISCONTINUED | OUTPATIENT
Start: 2023-05-21 | End: 2023-05-23 | Stop reason: HOSPADM

## 2023-05-21 RX ORDER — POTASSIUM CHLORIDE 750 MG/1
40 TABLET, EXTENDED RELEASE ORAL AS NEEDED
Status: DISCONTINUED | OUTPATIENT
Start: 2023-05-21 | End: 2023-05-23 | Stop reason: HOSPADM

## 2023-05-21 RX ORDER — ATORVASTATIN CALCIUM 40 MG/1
40 TABLET, FILM COATED ORAL NIGHTLY
Status: DISCONTINUED | OUTPATIENT
Start: 2023-05-21 | End: 2023-05-23 | Stop reason: HOSPADM

## 2023-05-21 RX ORDER — POTASSIUM CHLORIDE 750 MG/1
20 TABLET, EXTENDED RELEASE ORAL AS NEEDED
Status: DISCONTINUED | OUTPATIENT
Start: 2023-05-21 | End: 2023-05-23 | Stop reason: HOSPADM

## 2023-05-21 RX ORDER — HEPARIN SODIUM 5000 [USP'U]/ML
5000 INJECTION, SOLUTION INTRAVENOUS; SUBCUTANEOUS EVERY 8 HOURS
Status: DISCONTINUED | OUTPATIENT
Start: 2023-05-21 | End: 2023-05-23 | Stop reason: HOSPADM

## 2023-05-21 RX ORDER — HYDRALAZINE HYDROCHLORIDE 20 MG/ML
10 INJECTION INTRAMUSCULAR; INTRAVENOUS EVERY 4 HOURS PRN
Status: DISCONTINUED | OUTPATIENT
Start: 2023-05-21 | End: 2023-05-23 | Stop reason: HOSPADM

## 2023-05-21 RX ORDER — MORPHINE SULFATE 2 MG/ML
2 INJECTION, SOLUTION INTRAMUSCULAR; INTRAVENOUS EVERY 4 HOURS PRN
Status: DISCONTINUED | OUTPATIENT
Start: 2023-05-21 | End: 2023-05-23 | Stop reason: HOSPADM

## 2023-05-21 RX ORDER — ACETAMINOPHEN 325 MG/1
975 TABLET ORAL ONCE
Status: COMPLETED | OUTPATIENT
Start: 2023-05-21 | End: 2023-05-21

## 2023-05-21 RX ORDER — ACETAMINOPHEN 325 MG/1
650 TABLET ORAL EVERY 6 HOURS
Status: DISCONTINUED | OUTPATIENT
Start: 2023-05-21 | End: 2023-05-23 | Stop reason: HOSPADM

## 2023-05-21 RX ORDER — ASPIRIN 81 MG/1
81 TABLET ORAL EVERY MORNING
Status: DISCONTINUED | OUTPATIENT
Start: 2023-05-21 | End: 2023-05-23 | Stop reason: HOSPADM

## 2023-05-21 RX ORDER — CHOLECALCIFEROL (VITAMIN D3) 25 MCG
1000 TABLET ORAL DAILY
Status: DISCONTINUED | OUTPATIENT
Start: 2023-05-21 | End: 2023-05-23 | Stop reason: HOSPADM

## 2023-05-21 RX ADMIN — ACETAMINOPHEN 650 MG: 325 TABLET, FILM COATED ORAL at 19:02

## 2023-05-21 RX ADMIN — HEPARIN SODIUM 5000 UNITS: 5000 INJECTION, SOLUTION INTRAVENOUS; SUBCUTANEOUS at 21:12

## 2023-05-21 RX ADMIN — Medication 1000 UNITS: at 19:02

## 2023-05-21 RX ADMIN — ASPIRIN 81 MG: 81 TABLET, COATED ORAL at 19:01

## 2023-05-21 RX ADMIN — ACETAMINOPHEN 975 MG: 325 TABLET, FILM COATED ORAL at 12:24

## 2023-05-21 RX ADMIN — ATORVASTATIN CALCIUM 40 MG: 40 TABLET, FILM COATED ORAL at 21:12

## 2023-05-21 RX ADMIN — AMLODIPINE BESYLATE 5 MG: 5 TABLET ORAL at 21:12

## 2023-05-21 RX ADMIN — HYDROCHLOROTHIAZIDE: 25 TABLET ORAL at 19:02

## 2023-05-21 RX ADMIN — IOHEXOL 100 ML: 350 INJECTION, SOLUTION INTRAVENOUS at 15:13

## 2023-05-21 ASSESSMENT — ENCOUNTER SYMPTOMS
VOMITING: 0
FEVER: 0
NECK PAIN: 0
FACIAL SWELLING: 0
HEADACHES: 1
BACK PAIN: 1
ARTHRALGIAS: 0
CHILLS: 0
ABDOMINAL PAIN: 0
WOUND: 0
PALPITATIONS: 0
SHORTNESS OF BREATH: 0

## 2023-05-21 NOTE — H&P
Hospital Medicine Service -  History & Physical        CHIEF COMPLAINT     Chief Complaint   Patient presents with   • Fall        HISTORY OF PRESENT ILLNESS      82 y.o. female with a past medical history of anemia, constipation, GERD, hypertension, hyperlipidemia, osteoporosis who sustained a mechanical fall at home in her bathroom today when she was standing on a stepstool to change a light bulb and fell backwards.  She was initially able to get up and open her front door for her friend who is to bring her soup after [employer] and get to her couch.  However after sitting for a period of time she was unable to get back up without feeling sweaty and lightheaded which time she called EMS.  Initial vital signs temp 96.5 °F, heart rate 68, respiration rate 18, blood pressure 207/84, and SPO2 97% on room air.  Labs significant for sodium 132, glucose 118, bilirubin 1.4.  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    Patient lives alone. She had no nausea or vomiting.  No chest pain or palpitations.  No loss of bowel or bladder. She has had new back pain.  No assistive devices.     Patient to be admitted to medical surgical floor, PT OT evaluation,n social work evaluation, fall precautions, Ortho consult, neurosurgery consult. She lives at home by herself. Code status discussed and pt is a FULL code.    PAST MEDICAL AND SURGICAL HISTORY      Past Medical History:   Diagnosis Date   • Anemia     iron def.   • Basal cell carcinoma     nose , Moh's surgery   • Constipation    • Gallstones    • GERD (gastroesophageal reflux disease)    • Hiatal hernia    • Hypertension    • Kidney stone     found by ultrasound, no problem presently   • Lipid disorder    • Osteoporosis    • Postmenopausal        Past Surgical History:   Procedure Laterality Date   • BREAST SURGERY      right breast segmental resection   • COLONOSCOPY     •  ESOPHAGOGASTRODUODENOSCOPY     • MOHS SURGERY      nose   • ORIF ANKLE FRACTURE BIMALLEOLAR Left    • TEAR DUCT SURGERY Right     tear duct bypass       MEDICATIONS      Prior to Admission medications    Medication Sig Start Date End Date Taking? Authorizing Provider   cholecalciferol, vitamin D3, (VITAMIN D3) 25 mcg (1,000 unit) capsule  1/1/18  Yes Tico Calvin MD   latanoprost 0.005 % drops, emulsion  12/31/20  Yes Tico Calvin MD   amLODIPine (NORVASC) 5 mg tablet Take 5 mg by mouth nightly.    Bruce Calvin MD   aspirin 81 mg enteric coated tablet Take 81 mg by mouth every morning.    Bruce Calvin MD   atorvastatin (LIPITOR) 40 mg tablet Take 40 mg by mouth nightly.    Bruce Calvin MD   lisinopril-hydrochlorothiazide (PRINZIDE,ZESTORETIC) 20-25 mg per tablet Take 1 tablet by mouth nightly.    Bruce Calvin MD       ALLERGIES      Patient has no known allergies.    FAMILY HISTORY      History reviewed. No pertinent family history.    SOCIAL HISTORY      Social History     Socioeconomic History   • Marital status: Single     Spouse name: None   • Number of children: None   • Years of education: None   • Highest education level: None   Tobacco Use   • Smoking status: Former   • Smokeless tobacco: Never   Substance and Sexual Activity   • Alcohol use: Yes     Alcohol/week: 7.0 standard drinks of alcohol     Types: 7 Glasses of wine per week     Comment: occasional   • Drug use: No   • Sexual activity: Defer     Social Determinants of Health     Food Insecurity: No Food Insecurity (5/21/2023)    Hunger Vital Sign    • Worried About Running Out of Food in the Last Year: Never true    • Ran Out of Food in the Last Year: Never true       REVIEW OF SYSTEMS        Review of Systems  Per HPI    PHYSICAL EXAMINATION      Temp:  [35.8 °C (96.5 °F)-36.8 °C (98.2 °F)] 36.6 °C (97.8 °F)  Heart Rate:  [68-87] 74  Resp:  [18-22] 22  BP: (119-207)/(60-84) 165/79  Body  mass index is 26.63 kg/m².    Physical Exam  Constitutional:       General: She is not in acute distress.  Cardiovascular:      Rate and Rhythm: Normal rate.      Heart sounds: Normal heart sounds.   Pulmonary:      Breath sounds: Normal breath sounds.   Abdominal:      General: There is no distension.      Palpations: Abdomen is soft.      Tenderness: There is no abdominal tenderness.   Musculoskeletal:      Right lower leg: No edema.      Left lower leg: No edema.   Skin:     General: Skin is warm.      Capillary Refill: Capillary refill takes less than 2 seconds.   Neurological:      Mental Status: She is alert and oriented to person, place, and time.         LABS / IMAGING / EKG        Labs  CBC Results       05/21/23 04/08/20 06/14/18     1357 2353 0855    WBC 9.65 4.38 3.65    RBC 4.51 4.30 4.24    HGB 14.1 13.4 9.6    HCT 41.2 39.9 31.7    MCV 91.4 92.8 74.8    MCH 31.3 31.2 22.6    MCHC 34.2 33.6 30.3     179 259        CMP Results       05/21/23 04/08/20 03/15/18     1357 2353 0905     131 138    K 3.7 3.3 3.9    Cl 98 97 105    CO2 25 23 26    Glucose 118 101 100    BUN 20 20 13    Creatinine 0.6 0.6 0.7    Calcium 9.8 8.9 9.5    Anion Gap 9 11 7    AST 36 86 --    ALT 34 47 --    Albumin 4.6 4.2 --    EGFR >60.0 >60.0 >60.0         Comment for K at 1357 on 05/21/23: Results obtained on plasma. Plasma Potassium values may be up to 0.4 mEQ/L less than serum values. The differences may be greater for patients with high platelet or white cell counts.    Comment for K at 2353 on 04/08/20:   Results obtained on plasma. Plasma Potassium values may be up to 0.4 mEQ/L less than serum values. The differences may be greater for patients with high platelet or white cell counts.          Troponin I Results       04/09/20 04/08/20     0242 2353    Troponin I <0.02 <0.02        Microbiology Results     ** No results found for the last 720 hours. **        UA Results       04/09/20     0207    Color Yellow     Clarity Clear    Glucose Negative    Bilirubin Negative    Ketones Negative    Sp Grav 1.016    Blood Negative    Ph 7.0    Protein Negative    Urobilinogen 0.2    Nitrite Negative    Leuk Est Negative         Comment for Blood at 0207 on 04/09/20:   The sensitivity of the occult blood test is equivalent to approximately 4 intact RBC/HPF.    Comment for Leuk Est at 0207 on 04/09/20:   Results can be falsely negative due to high specific gravity, some antibiotics, glucose >3 g/dl, or WBC other than neutrophils.          Imaging  CT ANGIOGRAPHY HEAD/NECK WITH AND WITHOUT IV CONTRAST   Final Result   IMPRESSION:   1.  No high-grade stenosis or occlusion of the major intracranial arteries.   2.  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   3.  No high-grade stenosis of the cervical carotid or vertebral arteries.   4.  Left thyroid nodule measuring 2 cm.   5.  Patchy groundglass and interstitial opacities at the lung apices.      X-RAY THORACIC SPINE 2 VIEWS   Final Result   IMPRESSION:   Age indeterminant T12 compression deformity, new from 2020.      Multilevel spondylitic changes.         X-RAY LUMBAR SPINE 2 OR 3 VIEWS   Final Result   IMPRESSION:   Age indeterminant T12 compression deformity, new from 2020.      Multilevel spondylitic changes.         CT HEAD WITHOUT IV CONTRAST   Final Result   IMPRESSION:   1.  No acute intracranial hemorrhage.   2.  Cerebral volume loss and moderately severe subcortical and periventricular   white matter changes that may be related to chronic microangiopathic disease.   3.  Probable 7 mm basilar tip aneurysm. Assessment with CTA can be considered   for further evaluation.   4.  No acute fracture of the cervical spine. Reversal of the normal lordotic   curvature with multilevel spondylitic changes as described.            CT CERVICAL SPINE WITHOUT IV CONTRAST   Final Result   IMPRESSION:   1.  No acute intracranial hemorrhage.   2.  Cerebral volume loss and moderately severe  subcortical and periventricular   white matter changes that may be related to chronic microangiopathic disease.   3.  Probable 7 mm basilar tip aneurysm. Assessment with CTA can be considered   for further evaluation.   4.  No acute fracture of the cervical spine. Reversal of the normal lordotic   curvature with multilevel spondylitic changes as described.            MRI LUMBAR SPINE WITHOUT CONTRAST    (Results Pending)         ECG/Telemetry  reviewed by me    ASSESSMENT AND PLAN           * Ambulatory dysfunction  Assessment & Plan  Post mechanical fall on 5/21 new onset back pain, Age indeterminant T12 compression deformity without  retropulsion seen which is new from her most recent imaging in 2020.  Patient had difficulty with standing ambulating and symptomatic, diaphoretic.  Ortho consult  PT OT social work evaluation  Fall precautions    Hyponatremia  Assessment & Plan  Sodium 132, likely dry  IV fluids  Repeat BMP in AM    Hypertension  Assessment & Plan  Blood pressure elevated, 207/84, takes pressure medication at home at night and most recently took her medications 5/20  Continue amlodipine  Continue lisinopril hydrochlorothiazide  BP medication PRN    Hyperlipidemia  Assessment & Plan  atorvastatin    Thyroid nodule  Assessment & Plan  2 mm thyroid nodule noted, patient with a history of multiple thyroid nodules in the past and follows with endocrinology  Follow-up with endocrinology outpatient    Aneurysm (CMS/[facility])  Assessment & Plan  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

## 2023-05-21 NOTE — DISCHARGE INSTRUCTIONS
Please call your primary care doctor today to inform them of your ER visit today and arrange a follow up appointment within 2 days. Please return immediately to the emergency department for any new/worsening or concerning symptoms. It is imperative that you follow-up with an orthopedist for your injury so he/she may follow and ensure proper follow up treatment and healing. If you do not follow-up you may run the risk in the future of poor healing, chronic pain, loss of function etc.    You have been provided with copies of your lab work and imaging study impressions. Bring these with you to your follow up appointments with your doctors for further evaluation and testing as needed.        The office of Dr. Rain will reach out to you this week to schedule your angiogram. Please call the office at [phone] with any questions, concerns, or new/worsening symptoms.

## 2023-05-21 NOTE — ASSESSMENT & PLAN NOTE
Blood pressure elevated, 207/84, takes pressure medication at home at night and most recently took her medications 5/20  BP improved, but now orthostatic  Stop HCTZ

## 2023-05-21 NOTE — ED PROVIDER NOTES
Emergency Medicine Note  HPI   HISTORY OF PRESENT ILLNESS     HPI     82-year-old female with history of hypertension and GERD presents for evaluation following a mechanical fall resulting in injury to the head and back. The mechanism of injury was a mechanical  fall without syncope or near-syncope.  The patient was changing a light bulb on a stepstool and fell backwards, hitting her head and back on the bathroom tile around 7 AM.  The patient was able to get up and walk around, but pain worsened throughout the morning and she has not more difficulty getting up off the couch.  The current level of pain is moderate.  There was no loss of consciousness, confusion, seizure, or memory impairment.  There is not a laceration associated with the injury.  The patient does not have neck pain.  The patient does take blood thinner medication (81 mg aspiring daily).  Denies vomiting, numbness/weakness, fever, any other sxs.  The patient denies injury elsewhere.      Patient History   PAST HISTORY     Reviewed from Nursing Triage:       Past Medical History:   Diagnosis Date   • Anemia     iron def.   • Basal cell carcinoma     nose , Moh's surgery   • Constipation    • Gallstones    • GERD (gastroesophageal reflux disease)    • Hiatal hernia    • Hypertension    • Kidney stone     found by ultrasound, no problem presently   • Lipid disorder    • Osteoporosis    • Postmenopausal        Past Surgical History:   Procedure Laterality Date   • BREAST SURGERY      right breast segmental resection   • COLONOSCOPY     • ESOPHAGOGASTRODUODENOSCOPY     • MOHS SURGERY      nose   • ORIF ANKLE FRACTURE BIMALLEOLAR Left    • TEAR DUCT SURGERY Right     tear duct bypass       History reviewed. No pertinent family history.    Social History     Tobacco Use   • Smoking status: Former   • Smokeless tobacco: Never   Substance Use Topics   • Alcohol use: Yes     Alcohol/week: 7.0 standard drinks of alcohol     Types: 7 Glasses of wine per week      Comment: occasional   • Drug use: No         Review of Systems   REVIEW OF SYSTEMS     Review of Systems   Constitutional: Negative for chills and fever.   HENT: Negative for facial swelling.    Respiratory: Negative for shortness of breath.    Cardiovascular: Negative for chest pain and palpitations.   Gastrointestinal: Negative for abdominal pain and vomiting.   Musculoskeletal: Positive for back pain. Negative for arthralgias and neck pain.   Skin: Negative for wound.   Neurological: Positive for headaches. Negative for syncope.   All other systems reviewed and are negative.        VITALS     ED Vitals    Date/Time Temp Pulse Resp BP SpO2 [facility]   05/21/23 1111 35.8 °C (96.5 °F) 68 18 207/84 97 % BAM        Pulse Ox %: 99 % (05/21/23 1150)  Pulse Ox Interpretation: Normal (05/21/23 1150)  Heart Rate: 69 (05/21/23 1150)  Rhythm Strip Interpretation: Normal Sinus Rhythm (05/21/23 1150)     Physical Exam   PHYSICAL EXAM     Physical Exam  Vitals and nursing note reviewed.   Constitutional:       Appearance: She is well-developed.   HENT:      Head: Normocephalic and atraumatic.   Eyes:      Extraocular Movements: Extraocular movements intact.      Conjunctiva/sclera: Conjunctivae normal.      Pupils: Pupils are equal, round, and reactive to light.   Cardiovascular:      Rate and Rhythm: Normal rate and regular rhythm.   Pulmonary:      Effort: Pulmonary effort is normal.      Breath sounds: Normal breath sounds.   Abdominal:      General: There is no distension.      Palpations: Abdomen is soft. There is no mass.      Tenderness: There is no abdominal tenderness.   Musculoskeletal:         General: No tenderness (midline thoracic and lumbar ttp) or deformity. Normal range of motion.      Cervical back: Normal range of motion. No tenderness (no midline ttp).   Skin:     General: Skin is warm and dry.   Neurological:      Mental Status: She is alert. Mental status is at baseline.   Psychiatric:         Behavior:  Behavior normal.           PROCEDURES     Procedures     DATA     Results     None          Imaging Results    None         No orders to display       Scoring tools                                  ED Course & MDM   MDM / ED COURSE / CLINICAL IMPRESSION / DISPO     Medical Decision Making  See work up. No neuro deficits.    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  Radiology: ordered. Decision-making details documented in ED Course.      Risk  OTC drugs.  Prescription drug management.          ED Course as of 05/21/23 1641   Sun May 21, 2023   1130 Impression: mechanical fall, head injury, upper and lower back pain  Plan: Non contrast head and neck ct, x-ray thoracic spine, x-ray lumbar spine, tylenol, reeval [RH]   1156 BP(!): 207/84  Will continue to monitor [RH]   1250 CT HEAD WITHOUT IV CONTRAST  IMPRESSION:  1.  No acute intracranial hemorrhage.  2.  Cerebral volume loss and moderately severe subcortical and periventricular  white matter changes that may be related to chronic microangiopathic disease.  3.  Probable 7 mm basilar tip aneurysm. Assessment with CTA can be considered  for further evaluation.  4.  No acute fracture of the cervical spine. Reversal of the normal lordotic  curvature with multilevel spondylitic changes as described. [RH]   1254 CT CERVICAL SPINE WITHOUT IV CONTRAST  reviewed [RH]   1255 I discussed finding of 7 mm basilar tip aneurysm with Dr. Rain from neuro intervention.  She recommends doing a CTA of the head and neck.  She will review images and be able to see the patient on Tuesday 5/23/2023 if no acute findings in clinic. [RH]   1256 X-RAY THORACIC SPINE 2 VIEWS  IMPRESSION:  Age indeterminant T12  compression deformity, new from 2020.     Multilevel spondylitic changes. [RH]   1256 X-RAY LUMBAR SPINE 2 OR 3 VIEWS  reviewed [RH]   1332 Updated patient on imaging [RH]   1415 Reviewed cbc   [RH]   1431 Reviewed cmp [RH]   1554 CT ANGIOGRAPHY HEAD/NECK WITH AND WITHOUT IV CONTRAST  IMPRESSION:  1.  No high-grade stenosis or occlusion of the major intracranial arteries.  2.  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  3.  No high-grade stenosis of the cervical carotid or vertebral arteries.  4.  Left thyroid nodule measuring 2 cm.  5.  Patchy groundglass and interstitial opacities at the lung apices. [RH]   1555 Discussed with Dr. Rain (neuro intervention). She recommends outpatient follow up. She will see the patient in the clinic in 2 days.  Patient is able to ambulate without difficulty.  Patient does not have any neurological deficits.  Strength is 5 out of 5 bilateral lower extremities.  No saddle anesthesia.  No bowel or bladder incontinence.  No weakness. [RH]   1609 Extensive education provided on signs and symptoms that would warrant a return visit to the emergency department along with emphasis on importance of follow-up.  Patient verbalized understanding and agreeable.  All questions answered. [RH]   1640 Patient was able to ambulate, but she became diaphoretic, dizzy, and near syncopal when walking back to her stretcher.  Patient lives by herself with her son with a safe discharge plan.  Paged HMS. [RH]      ED Course User Index  [RH] Lucy Bower PA C     Clinical Impression      None               Lucy Bower PA C  05/21/23 1615       Lucy Bower PA C  05/21/23 1642

## 2023-05-21 NOTE — NURSING NOTE
Patient arrived from ED. Elevated BP and irregular heart rate. No chest pain or SOB. Made HMS aware and EKG ordered and completed. SR with 1st AV block.   Rechecked BP and administered patient's scheduled BP medication. BP already slightly improved. Continues to deny any chest pain or shortness of breath. Denies any numbness or tingling in extremities. +2 pulses. Lungs CTA, positive bowel sounds. No headaches or visual changes. 1/10 in lower back. tylenol given. Belongings at bedside. Patient now eating dinner.   Educated on call bell and oriented to room. Bed alarm on and call bell in reach.

## 2023-05-21 NOTE — CONSULTS
Orthopedic Consult Note    Subjective     Nelly Meyer is a 82 y.o. female with PMH of anemia, GERD, hypertension, and osteoporosis who presents with back pain after mechanical fall at her home today.  The patient was attempting to change a light bulb and was stepping up onto a footstool when she lost her balance and fell backwards.  She endorses head strike and denies loss of consciousness.  She was able to get up initially but was unable to continue ambulating throughout the day.  However, she has ambulated some in the ED.  The patient does endorse that she has been lightheaded intermittently since falling.  She has pain in her lower back without radiation to her legs.  She denies numbness, tingling, saddle anesthesia, urinary retention, bowel or bladder incontinence.  She is active at baseline and participates in Silver sneakers    Medical History:   Past Medical History:   Diagnosis Date   • Anemia     iron def.   • Basal cell carcinoma     nose , Moh's surgery   • Constipation    • Gallstones    • GERD (gastroesophageal reflux disease)    • Hiatal hernia    • Hypertension    • Kidney stone     found by ultrasound, no problem presently   • Lipid disorder    • Osteoporosis    • Postmenopausal        Surgical History:   Past Surgical History:   Procedure Laterality Date   • BREAST SURGERY      right breast segmental resection   • COLONOSCOPY     • ESOPHAGOGASTRODUODENOSCOPY     • MOHS SURGERY      nose   • ORIF ANKLE FRACTURE BIMALLEOLAR Left    • TEAR DUCT SURGERY Right     tear duct bypass       Social History:   Social History     Social History Narrative   • Not on file       Family History: Non-contributory at this time.     Allergies: Patient has no known allergies.    Current Inpatient Medications   Medication Dose Route Frequency Provider Last Rate Last Admin   • acetaminophen (TYLENOL) tablet 650 mg  650 mg oral q4h PRN Sandy Brumfield CRNP       • amLODIPine (NORVASC) tablet 5 mg  5 mg oral  Nightly Sandy Brumfield CRNP       • aspirin enteric coated tablet 81 mg  81 mg oral q AM Sandy Brumfield CRNP       • atorvastatin (LIPITOR) tablet 40 mg  40 mg oral Nightly Sandy Brumfield CRNP       • cholecalciferol (vitamin D3) tablet 1,000 Units  1,000 Units oral Daily Sandy Brumfield CRNP       • glucose chewable tablet 16-32 g of dextrose  16-32 g of dextrose oral PRN Sandy Brumfield CRNP        Or   • dextrose 40 % oral gel 15-30 g of dextrose  15-30 g of dextrose oral PRN Sandy Brumfield CRNP        Or   • glucagon (GLUCAGEN) injection 1 mg  1 mg intramuscular PRN Sandy Brumfield CRNP        Or   • dextrose 50 % in water (D50) injection 12.5 g  25 mL intravenous PRN Sandy Brumfield CRNP       • docusate sodium (COLACE) capsule 100 mg  100 mg oral BID Sandy Brumfield CRNP       • heparin (porcine) 5,000 unit/mL injection 5,000 Units  5,000 Units subcutaneous q8h EVI Sandy Brumfield CRNP       • hydrALAZINE (APRESOLINE) injection 10 mg  10 mg intravenous q4h PRN Sugey Rodriguez MD       • lisinopriL (PRINIVIL) 20 mg, hydrochlorothiazide (HYDRODIURIL) 25 mg for ZESTORETIC 20-25   oral Daily Sandy Brumfield CRNP       • magnesium sulfate IVPB 2g in 50 mL NSS/D5W/SWFI  2 g intravenous PRN Sugey Rodriguez MD       • ondansetron (ZOFRAN) injection 4 mg  4 mg intravenous q8h PRN Sandy Brumfield CRNP       • potassium chloride (KLOR-CON M) ER tablet (particles/crystals) 20 mEq  20 mEq oral PRN Sugey Rodriguez MD       • potassium chloride (KLOR-CON M) ER tablet (particles/crystals) 40 mEq  40 mEq oral PRN Sugey Rodriguez MD       • potassium chloride 20 mEq in 100 mL IVPB  (premix)  20 mEq intravenous PRN Sugey Rodriguez MD       • potassium chloride 20 mEq in 100 mL IVPB  (premix)  20 mEq intravenous PRN Sugey Rodriguez MD        And   • potassium chloride 20 mEq in 100 mL IVPB  (premix)  20 mEq intravenous PRN Sugey Rodriguez MD       • sodium chloride 0.9 % infusion    intravenous Continuous Sandy Brumfield CRNP            Medication List      ASK your doctor about these medications    amLODIPine 5 mg tablet  Commonly known as: NORVASC  Take 5 mg by mouth nightly.  Dose: 5 mg     aspirin 81 mg enteric coated tablet  Take 81 mg by mouth nightly.  Dose: 81 mg     atorvastatin 40 mg tablet  Commonly known as: LIPITOR  Take 40 mg by mouth nightly.  Dose: 40 mg     COLLAGEN-BIOTIN-ASCORBIC ACID ORAL  Take by mouth daily.     HAIR-SKIN-NAIL(VIT A,C-BIOTIN) ORAL  Take 1 tablet by mouth daily.  Dose: 1 tablet     latanoprost 0.005 % drops, emulsion  Administer 1 drop into both eyes nightly.  Dose: 1 drop     lisinopriL-hydrochlorothiazide 20-25 mg per tablet  Commonly known as: PRINZIDE,ZESTORETIC  Take 1 tablet by mouth nightly.  Dose: 1 tablet     VITAMIN D3 25 mcg (1,000 unit) capsule  Take 1,000 Units by mouth nightly.  Dose: 1,000 Units  Generic drug: cholecalciferol (vitamin D3)            Review of Systems  Pertinent items are noted in HPI.    Objective   Labs  Results from last 7 days   Lab Units 05/21/23  1357   WBC K/uL 9.65   HEMOGLOBIN g/dL 14.1   HEMATOCRIT % 41.2   PLATELETS K/uL 212               Physical Exam    Spine  Skin CDI w/o bruising/ecchymosis  No TTP spinous processes  Moderate tenderness to palpation at the left lower lumbar paraspinals  No step offs  Gait not observed, spine ROM causes some discomfort.  Nonpainful ROM of the C-spine    LLE  Negative SLR   5/5 motor L2-S1  SILT L2-S1  Compartments soft and compressible  Ext WWP, 2+ DP pulse    RLE  Negative SLR   5/5 motor L2-S1  SILT L2-S1  Compartments soft and compressible  Ext WWP, 2+ DP pulse        Imaging: Reviewed  X-Ray of the T and L-spine demonstrates mild compression deformity of T12, which is new from previous imaging in 2020.       Assessment     82 y.o. female consulted for VCF of T12     Plan   I did review with her the natural history the compression fracture with excellent healing potential  over time with activity modification bracing and pain management.  We did discuss the potential for surgical stabilization with a kyphoplasty if the pain is debilitating and she cannot progress to go home.  At this point she is managed well with the Tylenol and once cleared by PT she may progress home with activity modification avoid straining walk as tolerated the brace as needed Tylenol as needed and follow-up in the office in 2 to 3 weeks and avoid straining  Did discuss an MRI study if she has severe pain and cannot progress with therapy and she would like to consider stabilization with kyphoplasty  - No acute intervention needed at this time  - Patient may weight-bear as tolerated  - PT/OT  Abdominal binder for brace as needed.  She may progress with activities as tolerated    Artie May JR, MD Nicholas Bennett, DO, PGY-2  Orthopedic Surgery  Pager: [phone]

## 2023-05-21 NOTE — ASSESSMENT & PLAN NOTE
Post mechanical fall on 5/21 new onset back pain, Age indeterminant T12 compression deformity without  retropulsion seen which is new from her most recent imaging in 2020.  Patient had difficulty with standing ambulating and symptomatic, diaphoretic.  MRI lumbar spine pending  PT OT evaluation  Fall precautions

## 2023-05-21 NOTE — ED ATTESTATION NOTE
Procedures  Physical Exam  Review of Systems    5/21/202311:37 AM  I have personally seen and examined the patient.  I personally performed the key   components of the encounter and provided a substantive portion of the care and   medical decision making for this patient.     I have reviewed and agree with the PA/NP/Resident's assessment and ED plan of care, with any exceptions as documented below.  My examination, assessment and plan of care of Nelly Meyer is as follows:    Patient is a 82-year-old female who presents after a fall, patient reports she was changing a light in her home when she fell from a stepstool landing on her back and striking her head, patient was able to get herself up and move around, over the course of the day she has become more stiff and sore, presents for evaluation, patient does take low-dose aspirin but no other blood thinning medications    Exam:   Vital signs have been reviewed, pulse ox is 97% on room air, normal  Heart: RRR, normal S1/S2  Lungs: CTA bilaterally  Abdo: soft, non-tender    Plan/Medical Decision Making: Patient is a 82-year-old female who presents after a fall, checking imaging, reevaluate afterwards      This document was created using Dragon Dictation software. There might be some typographical errors due to this technology.          Godshall, Duane K, MD  05/21/23 0291

## 2023-05-21 NOTE — ASSESSMENT & PLAN NOTE
2 mm thyroid nodule noted, patient with a history of multiple thyroid nodules in the past and follows with endocrinology  Follow-up with endocrinology outpatient

## 2023-05-21 NOTE — PROGRESS NOTES
NSGY Attending Note     82F s/p fall. 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     Recommend that patient follow up with me in clinic on 5/23 to discuss results and next steps. She should be counseled that if she develops any headaches or worsening neuro symptoms to call 911 or return to ER immediately.     Ramiro Rain MD

## 2023-05-21 NOTE — ASSESSMENT & PLAN NOTE
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

## 2023-05-22 PROBLEM — S22.080A T12 COMPRESSION FRACTURE, INITIAL ENCOUNTER (CMS/HCC): Status: ACTIVE | Noted: 2023-05-22

## 2023-05-22 LAB
ANION GAP SERPL CALC-SCNC: 5 MEQ/L (ref 3–15)
ATRIAL RATE: 70
BUN SERPL-MCNC: 18 MG/DL (ref 8–20)
CALCIUM SERPL-MCNC: 9.4 MG/DL (ref 8.9–10.3)
CHLORIDE SERPL-SCNC: 100 MEQ/L (ref 98–109)
CO2 SERPL-SCNC: 27 MEQ/L (ref 22–32)
CREAT SERPL-MCNC: 0.6 MG/DL (ref 0.6–1.1)
ERYTHROCYTE [DISTWIDTH] IN BLOOD BY AUTOMATED COUNT: 12.3 % (ref 11.7–14.4)
GFR SERPL CREATININE-BSD FRML MDRD: >60 ML/MIN/1.73M*2
GLUCOSE BLD-MCNC: 125 MG/DL (ref 70–99)
GLUCOSE SERPL-MCNC: 123 MG/DL (ref 70–99)
HCT VFR BLDCO AUTO: 36.9 % (ref 35–45)
HGB BLD-MCNC: 12.6 G/DL (ref 11.8–15.7)
MAGNESIUM SERPL-MCNC: 2 MG/DL (ref 1.8–2.5)
MCH RBC QN AUTO: 31.2 PG (ref 28–33.2)
MCHC RBC AUTO-ENTMCNC: 34.1 G/DL (ref 32.2–35.5)
MCV RBC AUTO: 91.3 FL (ref 83–98)
P AXIS: 46
PDW BLD AUTO: 9.2 FL (ref 9.4–12.3)
PLATELET # BLD AUTO: 187 K/UL (ref 150–369)
POCT TEST: ABNORMAL
POTASSIUM SERPL-SCNC: 3.1 MEQ/L (ref 3.6–5.1)
PR INTERVAL: 214
QRS DURATION: 116
QT INTERVAL: 414
QTC CALCULATION(BAZETT): 447
R AXIS: -7
RBC # BLD AUTO: 4.04 M/UL (ref 3.93–5.22)
SODIUM SERPL-SCNC: 132 MEQ/L (ref 136–144)
T WAVE AXIS: 18
VENTRICULAR RATE: 70
WBC # BLD AUTO: 5.99 K/UL (ref 3.8–10.5)

## 2023-05-22 PROCEDURE — 63700000 HC SELF-ADMINISTRABLE DRUG

## 2023-05-22 PROCEDURE — 97166 OT EVAL MOD COMPLEX 45 MIN: CPT | Mod: GO

## 2023-05-22 PROCEDURE — 99233 SBSQ HOSP IP/OBS HIGH 50: CPT | Performed by: HOSPITALIST

## 2023-05-22 PROCEDURE — 97535 SELF CARE MNGMENT TRAINING: CPT | Mod: GO

## 2023-05-22 PROCEDURE — 99291 CRITICAL CARE FIRST HOUR: CPT | Performed by: STUDENT IN AN ORGANIZED HEALTH CARE EDUCATION/TRAINING PROGRAM

## 2023-05-22 PROCEDURE — 83735 ASSAY OF MAGNESIUM: CPT

## 2023-05-22 PROCEDURE — 63600000 HC DRUGS/DETAIL CODE

## 2023-05-22 PROCEDURE — 96372 THER/PROPH/DIAG INJ SC/IM: CPT | Mod: 59 | Performed by: HOSPITALIST

## 2023-05-22 PROCEDURE — 12000000 HC ROOM AND CARE MED/SURG

## 2023-05-22 PROCEDURE — 97112 NEUROMUSCULAR REEDUCATION: CPT | Mod: GP

## 2023-05-22 PROCEDURE — 36415 COLL VENOUS BLD VENIPUNCTURE: CPT

## 2023-05-22 PROCEDURE — 63700000 HC SELF-ADMINISTRABLE DRUG: Performed by: HOSPITALIST

## 2023-05-22 PROCEDURE — 63700000 HC SELF-ADMINISTRABLE DRUG: Performed by: NURSE PRACTITIONER

## 2023-05-22 PROCEDURE — G0378 HOSPITAL OBSERVATION PER HR: HCPCS

## 2023-05-22 PROCEDURE — 97162 PT EVAL MOD COMPLEX 30 MIN: CPT | Mod: GP

## 2023-05-22 PROCEDURE — 99223 1ST HOSP IP/OBS HIGH 75: CPT | Performed by: STUDENT IN AN ORGANIZED HEALTH CARE EDUCATION/TRAINING PROGRAM

## 2023-05-22 PROCEDURE — 85027 COMPLETE CBC AUTOMATED: CPT

## 2023-05-22 PROCEDURE — 80048 BASIC METABOLIC PNL TOTAL CA: CPT

## 2023-05-22 PROCEDURE — 63600000 HC DRUGS/DETAIL CODE: Performed by: NURSE PRACTITIONER

## 2023-05-22 PROCEDURE — 96374 THER/PROPH/DIAG INJ IV PUSH: CPT | Mod: 59 | Performed by: HOSPITALIST

## 2023-05-22 RX ORDER — LIDOCAINE 560 MG/1
1 PATCH PERCUTANEOUS; TOPICAL; TRANSDERMAL DAILY
Status: DISCONTINUED | OUTPATIENT
Start: 2023-05-22 | End: 2023-05-23 | Stop reason: HOSPADM

## 2023-05-22 RX ORDER — KETOROLAC TROMETHAMINE 15 MG/ML
15 INJECTION, SOLUTION INTRAMUSCULAR; INTRAVENOUS EVERY 6 HOURS
Status: COMPLETED | OUTPATIENT
Start: 2023-05-22 | End: 2023-05-23

## 2023-05-22 RX ORDER — SODIUM CHLORIDE 9 MG/ML
INJECTION, SOLUTION INTRAVENOUS CONTINUOUS
Status: DISCONTINUED | OUTPATIENT
Start: 2023-05-22 | End: 2023-05-22

## 2023-05-22 RX ORDER — LISINOPRIL 20 MG/1
20 TABLET ORAL DAILY
Status: DISCONTINUED | OUTPATIENT
Start: 2023-05-23 | End: 2023-05-23 | Stop reason: HOSPADM

## 2023-05-22 RX ADMIN — ACETAMINOPHEN 650 MG: 325 TABLET, FILM COATED ORAL at 01:20

## 2023-05-22 RX ADMIN — ATORVASTATIN CALCIUM 40 MG: 40 TABLET, FILM COATED ORAL at 21:53

## 2023-05-22 RX ADMIN — ACETAMINOPHEN 650 MG: 325 TABLET, FILM COATED ORAL at 12:09

## 2023-05-22 RX ADMIN — KETOROLAC TROMETHAMINE 15 MG: 15 INJECTION, SOLUTION INTRAMUSCULAR; INTRAVENOUS at 09:01

## 2023-05-22 RX ADMIN — HEPARIN SODIUM 5000 UNITS: 5000 INJECTION, SOLUTION INTRAVENOUS; SUBCUTANEOUS at 21:53

## 2023-05-22 RX ADMIN — ASPIRIN 81 MG: 81 TABLET, COATED ORAL at 09:01

## 2023-05-22 RX ADMIN — ACETAMINOPHEN 650 MG: 325 TABLET, FILM COATED ORAL at 18:26

## 2023-05-22 RX ADMIN — HYDROCHLOROTHIAZIDE: 25 TABLET ORAL at 09:01

## 2023-05-22 RX ADMIN — LIDOCAINE 1 PATCH: 4 PATCH TOPICAL at 12:09

## 2023-05-22 RX ADMIN — HEPARIN SODIUM 5000 UNITS: 5000 INJECTION, SOLUTION INTRAVENOUS; SUBCUTANEOUS at 15:11

## 2023-05-22 RX ADMIN — Medication 1000 UNITS: at 09:01

## 2023-05-22 RX ADMIN — KETOROLAC TROMETHAMINE 15 MG: 15 INJECTION, SOLUTION INTRAMUSCULAR; INTRAVENOUS at 18:26

## 2023-05-22 RX ADMIN — ACETAMINOPHEN 650 MG: 325 TABLET, FILM COATED ORAL at 06:23

## 2023-05-22 RX ADMIN — AMLODIPINE BESYLATE 5 MG: 5 TABLET ORAL at 21:53

## 2023-05-22 RX ADMIN — HEPARIN SODIUM 5000 UNITS: 5000 INJECTION, SOLUTION INTRAVENOUS; SUBCUTANEOUS at 06:24

## 2023-05-22 RX ADMIN — POTASSIUM CHLORIDE 40 MEQ: 750 TABLET, EXTENDED RELEASE ORAL at 11:18

## 2023-05-22 ASSESSMENT — COGNITIVE AND FUNCTIONAL STATUS - GENERAL
DO YOU HAVE SERIOUS DIFFICULTY WALKING OR CLIMBING STAIRS: NO
EATING MEALS: 4 - NONE
DRESSING REGULAR UPPER BODY CLOTHING: 4 - NONE
HELP NEEDED FOR BATHING: 3 - A LITTLE
MOVING TO AND FROM BED TO CHAIR: 4 - NONE
TOILETING: 3 - A LITTLE
WALKING IN HOSPITAL ROOM: 4 - NONE
DRESSING REGULAR LOWER BODY CLOTHING: 3 - A LITTLE
AFFECT: WFL
CLIMB 3 TO 5 STEPS WITH RAILING: 3 - A LITTLE
STANDING UP FROM CHAIR USING ARMS: 4 - NONE
HELP NEEDED FOR PERSONAL GROOMING: 4 - NONE
AFFECT: WNL

## 2023-05-22 ASSESSMENT — GAIT ASSESSMENTS
RIGHT STEP LENGTH AND HEIGHT: PASSES LEFT STANCE FOOT
LEFT STEP LENGTH AND HEIGHT: LEFT FOOT COMPLETE CLEARS FLOOR
RIGHT STEP LENGTH AND HEIGHT: RIGHT FOOT COMPLETE CLEARS FLOOR
GAIT SCORE: 12
STEP SYMMETRY: RIGHT AND LEFT STEP APPEAR EQUAL
LEFT STEP LENGTH AND HEIGHT: PASSES RIGHT STANCE FOOT
TINETTI GAIT ASSESSMENT: CONTINUOUS
STEP CONTINUITY: STEPS APPEAR CONTINUOUS
TRUNK: NO SWAY, NO FLEXION, NO USE OF ARMS, NO WALKING AID
INITIATION OF GAIT SCORE: NO HESITANCY
WALKING TIME: HEELS ALMOST TOUCHING WHILE WALKING
PATH: STRAIGHT WITHOUT WALKING AID

## 2023-05-22 ASSESSMENT — BALANCE ASSESSMENTS
NUDGED: STEADY WITHOUT WALKER OR OTHER SUPPORT
EYES CLOSED SCORE: STEADY
SITTING DOWN: SAFE, SMOOTH MOTION
SITTING BALANCE: NARROW STANCE WITHOUT SUPPORT
SITTING BALANCE: STEADY, SAFE
BALANCE SCORE: 15
TOTAL SCORE: 27
ATTEMPTS TO ARISE: ABLE TO ARISE, ONE ATTEMPT
IMMEDIATE STANDING BALANCE FIRST 5 SECONDS: STEADY WITHOUT WALKER OR OTHER SUPPORT
ARISES: ABLE, USES ARMS TO HELP
TURNING 360 DEGREES STEADINESS: STEADY

## 2023-05-22 NOTE — H&P (VIEW-ONLY)
Neurosurgery Consultation    CC:   Chief Complaint   Patient presents with   • Fall       Reason for Consultation:  Aneurysm     Requesting Provider:  MARIA TERESA Williamson   Patient seen and examined 5/22/23 0800    History of Present Illness:   Nelly Meyer is a 82 y.o.  female, with significant past medical history of HTN, HLD, and osteoporosis, who presented to [facility] ED for evaluation s/p fall. Patient states she was standing on a stool in her bathroom changing a sconce light bulb when she lost her footing and fell backwards onto the tile, striking the back of her head. She denies loss of consciousness and recalls the entire event. Head CT was completed upon arrival to the ED, followed by CTA, showing a 7mm basilar tip aneurysm, prompting neurointervention consult.    Upon interview, patient states she does have midback discomfort from T12 fracture. She denies headaches, visual changes, nausea, vomiting, weakness paresthesias. She was unaware of having an aneurysm. She denies family history of aneurysms. She takes Aspirin 81mg daily but no other anticoagulation or antiplatelet medication.    Medical History:   Past Medical History:   Diagnosis Date   • Anemia     iron def.   • Basal cell carcinoma     nose , Moh's surgery   • Constipation    • Gallstones    • GERD (gastroesophageal reflux disease)    • Hiatal hernia    • Hypertension    • Kidney stone     found by ultrasound, no problem presently   • Lipid disorder    • Osteoporosis    • Postmenopausal        Surgical History:   Past Surgical History:   Procedure Laterality Date   • BREAST SURGERY      right breast segmental resection   • COLONOSCOPY     • ESOPHAGOGASTRODUODENOSCOPY     • MOHS SURGERY      nose   • ORIF ANKLE FRACTURE BIMALLEOLAR Left    • TEAR DUCT SURGERY Right     tear duct bypass       Family History: History reviewed. No pertinent family history.    Social History:   Social History     Socioeconomic History   • Marital status: Single      Spouse name: None   • Number of children: None   • Years of education: None   • Highest education level: None   Tobacco Use   • Smoking status: Former   • Smokeless tobacco: Never   Substance and Sexual Activity   • Alcohol use: Yes     Alcohol/week: 7.0 standard drinks of alcohol     Types: 7 Glasses of wine per week     Comment: occasional   • Drug use: No   • Sexual activity: Defer     Social Determinants of Health     Food Insecurity: No Food Insecurity (5/21/2023)    Hunger Vital Sign    • Worried About Running Out of Food in the Last Year: Never true    • Ran Out of Food in the Last Year: Never true       Allergies: Patient has no known allergies.    Home Medications:   •  amLODIPine, Take 5 mg by mouth nightly.  •  aspirin, Take 81 mg by mouth nightly.  •  atorvastatin, Take 40 mg by mouth nightly.  •  cholecalciferol (vitamin D3), Take 1,000 Units by mouth nightly.  •  COLLAGEN-BIOTIN-ASCORBIC ACID ORAL, Take by mouth daily.  •  latanoprost, Administer 1 drop into both eyes nightly.  •  lisinopriL-hydrochlorothiazide, Take 1 tablet by mouth nightly.  •  vit A/vit C/biotin/zinc/copper (HAIR-SKIN-NAIL,VIT A,C-BIOTIN, ORAL), Take 1 tablet by mouth daily.    Current Medications:   •  acetaminophen, 650 mg, oral, q6h EVI  •  amLODIPine, 5 mg, oral, Nightly  •  aspirin, 81 mg, oral, q AM  •  atorvastatin, 40 mg, oral, Nightly  •  cholecalciferol (vitamin D3), 1,000 Units, oral, Daily  •  glucose, 16-32 g of dextrose, oral, PRN **OR** dextrose, 15-30 g of dextrose, oral, PRN **OR** glucagon, 1 mg, intramuscular, PRN **OR** dextrose 50 % in water (D50), 25 mL, intravenous, PRN  •  docusate sodium, 100 mg, oral, BID  •  heparin (porcine), 5,000 Units, subcutaneous, q8h EVI  •  hydrALAZINE, 10 mg, intravenous, q4h PRN  •  ketorolac, 15 mg, intravenous, q6h EVI  •  lidocaine, 1 patch, Topical, Daily  •  lisinopriL (PRINIVIL) 20 mg, hydrochlorothiazide (HYDRODIURIL) 25 mg for ZESTORETIC 20-25, , oral, Daily  •   magnesium sulfate, 2 g, intravenous, PRN  •  morphine, 2 mg, intravenous, q4h PRN  •  ondansetron, 4 mg, intravenous, q8h PRN  •  potassium chloride, 20 mEq, oral, PRN  •  potassium chloride, 40 mEq, oral, PRN  •  potassium chloride, 20 mEq, intravenous, PRN  •  potassium chloride in water, 20 mEq, intravenous, PRN **AND** potassium chloride in water, 20 mEq, intravenous, PRN    Review of Systems: A 14 point review of systems was performed and aside from what is mentioned above is otherwise negative.    General physical examination:  Temp:  [35.8 °C (96.5 °F)-37.1 °C (98.7 °F)] 36.6 °C (97.9 °F)  Heart Rate:  [65-87] 73  Resp:  [16-22] 16  BP: ()/(53-89) 136/69     The patient is supine in her bed in no acute distress, appears her stated age.   There is no peripheral edema and there are 2+ radial and dorsal pedis pulses.      General Appearance: Alert and awake   Head: Normocephalic, atraumatic.   Eyes:  ENMT: No conjunctival injection. Symmetrical lids  Atraumatic external nose and ears. Moist MM.   Neck: Supple, no nuchal rigidity.   Respiratory: Good respiratory effort.   Cardiovascular: Regular rate.   Abdomen: Soft without distension   Extremities: No edema.   Skin: Dry, no rashes.       Neurologic examination:  Mental status:  The patient is alert, attentive, and oriented. Speech is clear and fluent with good repetition, comprehension, and naming.  Remote and recent memory are normal as well as fund of knowledge.    Cranial nerves:  CN II: Pupils are equal and briskly reactive to light.   CN III, IV, VI: Extra-occular muscles are intact  CN V: Facial sensation is intact and equal in V1-V3 distributions bilaterally.   CN VII: Face is symmetric with normal eye closure and smile.  CN VIII: Hearing is normal to rubbing fingers  CN IX, X: Palate elevates symmetrically. Phonation is normal.  CN XI: Head turning and shoulder shrug are intact  CN XII: Tongue is midline with normal movements and no  atrophy.    Motor:  There is no pronator drift. Muscle bulk and tone are normal.      Deltoid Biceps Triceps Wrist ext Finger ext Hand Intrinsics Hip flexion Knee ext Dorsi-  flexion EHL Plantar Flexion   R 5 5 5 5 5 5 5 5 5 5 5   L 5 5 5 5 5 5 5 5 5 5 5       Reflexes:  There is no Segura's sign or ankle clonus.    Sensory:  Intact light touch throughout all 4 extremities.    Coordination:  There is no dysmetria on finger-to-nose testing.      Gait:  Deferred due to thoracic fracture.      Data Review:    Labs  WBC   Date Value Ref Range Status   05/22/2023 5.99 3.80 - 10.50 K/uL Final     Hemoglobin   Date Value Ref Range Status   05/22/2023 12.6 11.8 - 15.7 g/dL Final     Hematocrit   Date Value Ref Range Status   05/22/2023 36.9 35.0 - 45.0 % Final     MCV   Date Value Ref Range Status   05/22/2023 91.3 83.0 - 98.0 fL Final     Platelets   Date Value Ref Range Status   05/22/2023 187 150 - 369 K/uL Final     Sodium   Date Value Ref Range Status   05/22/2023 132 (L) 136 - 144 mEQ/L Final     Potassium   Date Value Ref Range Status   05/22/2023 3.1 (L) 3.6 - 5.1 mEQ/L Final     BUN   Date Value Ref Range Status   05/22/2023 18 8 - 20 mg/dL Final     Creatinine   Date Value Ref Range Status   05/22/2023 0.6 0.6 - 1.1 mg/dL Final       All labs were personally reviewed and interpreted at time of note.     Images  CT ANGIOGRAPHY HEAD/NECK WITH AND WITHOUT IV CONTRAST    Result Date: 5/21/2023  IMPRESSION: 1.  No high-grade stenosis or occlusion of the major intracranial arteries. 2.  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 3.  No high-grade stenosis of the cervical carotid or vertebral arteries. 4.  Left thyroid nodule measuring 2 cm. 5.  Patchy groundglass and interstitial opacities at the lung apices.    X-RAY THORACIC SPINE 2 VIEWS, X-RAY LUMBAR SPINE 2 OR 3 VIEWS    Result Date: 5/21/2023  IMPRESSION: Age indeterminant T12 compression deformity, new from 2020. Multilevel spondylitic changes.     CT HEAD  WITHOUT IV CONTRAST, CT CERVICAL SPINE WITHOUT IV CONTRAST    Result Date: 5/21/2023  IMPRESSION: 1.  No acute intracranial hemorrhage. 2.  Cerebral volume loss and moderately severe subcortical and periventricular white matter changes that may be related to chronic microangiopathic disease. 3.  Probable 7 mm basilar tip aneurysm. Assessment with CTA can be considered for further evaluation. 4.  No acute fracture of the cervical spine. Reversal of the normal lordotic curvature with multilevel spondylitic changes as described.     All imaging was personally reviewed and interpreted at time of note.     Assessment and Plan:  In summary, Nelly Meyer is a 82 y.o. female with past medical history of HTN, HLD, and osteoporosis who presents for evaluation s/p mechanical fall. Head CT and CTA were performed, showing a 7 mm basilar tip aneurysm, which is a new diagnosis for her. She denies headaches, visual changes, nausea, vomiting, and weakness. On exam, she is neurologically intact without weakness or cranial nerve deficit. No acute neurointervention. Patient will need an angiogram as an outpatient for further evaluation of her aneurysm, followed by likely treatment of the aneurysm. Follow up with Dr. Rain this week for angio planning.     TAMIE Frederick    D/w Dr. Rain.

## 2023-05-22 NOTE — SIGNIFICANT EVENT
.     Hospital Medicine  RRT or Code Blue Note       SUMMARY OF EVENT   This is a 82 y.o. year-old female who was admitted on 5/21/2023 with near syncope and ambulatory dysfunction.  Rapid response called earlier in the morning after the patient was noted to have a witnessed fall in the bathroom. Reports indicate she was assisted to the bathroom and when standing up from the toilet had increasing pain in her low back which then caused her to feel weak. She was aided to her knees and then was having difficulty standing up on her own with one assist.  Blood glucose level was normal.  She was assisted up into her chair and then aided into her bed. Pain improved at that point. Neurologically intact.   CODE STATUS      Their Code Status at the start of the event was Full Code      CODE LEADER   Miller Arshad MD       PERTINENT PHYSICAL EXAMINATION      Pre-Event Vital Signs: Temp:  [35.8 °C (96.5 °F)-37.1 °C (98.7 °F)] 37.1 °C (98.7 °F)  Heart Rate:  [65-87] 65  Resp:  [16-22] 16  BP: ()/(53-89) 97/53    Physical Exam  Gen: In no acute distress  CV: Well perfused  Resp: Nonlabored  Neuro: Intact   SIGNIFICANT LABS / IMAGING      Labs  Reviewed previous    Imaging  Reviewed previous.    ECG/Telemetry  None performed. Reviewed previous.          POST-EVENT      Diagnosis:  Witnessed fall, sounds related to pain from low back. Incidentally she also was found to be orthostatic possible on admission but this does not sound to have been an orthostatic event.  Noted aneurysm. No head trauma. No neurologic deficits. Will defer any further imaging for now.  Maintain ambulatory with assist. Pain control.    Patient condition at end of event: Stable, improved    Disposition: Remains on med-surg floor    Critical Care Time: 30 minutes     KEY COMMUNICATION      Will notify attending at change of shift    Discussed with Emergency Contact: Attempted to call though went straight to voicemail. Will attempt again  later.    Follow-Up/Handoff: Ambulatory status

## 2023-05-22 NOTE — PROGRESS NOTES
Occupational Therapy -  Initial Evaluation     Patient: Nelly Meyer  Location: [address]  MRN: [id number]  Today's date: 5/22/2023    HISTORY OF PRESENT ILLNESS     Nelly is a 82 y.o. 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 Principal problem is Ambulatory dysfunction.    Past Medical History  Nelly has a past medical history of Anemia, Basal cell carcinoma, Constipation, Gallstones, GERD (gastroesophageal reflux disease), Hiatal hernia, Hypertension, Kidney stone, Lipid disorder, Osteoporosis, and Postmenopausal.    History of Present Illness   Nelly Meyer is a 82 y.o.  female, with significant past medical history of HTN, HLD, and osteoporosis, who presented to [place] ED for evaluation s/p fall. Patient states she was standing on a stool in her bathroom changing a sconce light bulb when she lost her footing and fell backwards onto the tile, striking the back of her head. She denies loss of consciousness and recalls the entire event. Head CT was completed upon arrival to the ED, followed by CTA, showing a 7mm basilar tip aneurysm, prompting neurointervention consult.     Upon interview, patient states she does have midback discomfort from T12 fracture. She denies headaches, visual changes, nausea, vomiting, weakness paresthesias. She was unaware of having an aneurysm. She denies family history of aneurysms. She takes Aspirin 81mg daily but no other anticoagulation or antiplatelet medication.    PRIOR LEVEL OF FUNCTION AND LIVING ENVIRONMENT     Prior Level of Function    Flowsheet Row Most Recent Value   Dominant Hand right   Ambulation independent   Transferring independent   Toileting independent   Bathing independent   Dressing independent   Eating independent   IADLs independent    Driving/Transportation    Assistive Device/Animal Currently Used at Home none        Prior Living Environment    Flowsheet Row Most Recent Value   People in Home alone   Current Living Arrangements home   Home Accessibility wheelchair accessible, ramp to enter home   Living Environment Comment condo, no steps to enter, tub/ shower combo with grab bars        Occupational Profile    Flowsheet Row Most Recent Value   Successful Occupations retired, indep PLOF        VITALS AND PAIN     OT Vitals    Date/Time Pulse HR Source O2 Therapy BP BP Location BP Method Pt Position [facility]   05/22/23 1040 67 Monitor -- 160/72 Right upper arm Automatic Lying CLG   05/22/23 1043 -- -- -- 139/65 Right upper arm Automatic Standing CLG   05/22/23 1050 80 Monitor None (Room air) 146/70 Right upper arm Automatic Sitting CLG      OT Pain    Date/Time Pain Type Side/Orientation Location Rating: Rest Rating: Activity [facility]   05/22/23 1040 Pain Assessment -- -- 0 - no pain 0 - no pain CLG   05/22/23 1050 Pain Assessment lower back 2 - mild pain 2 - mild pain CLG        Objective   OBJECTIVE     Start time:  1028  End time:  1052  Session Length: 24 min  Mode of Treatment: individual therapy, occupational therapy    General Observations  Patient received in bed, supine. She was agreeable to therapy, no issues or concerns identified by nurse prior to session.      Precautions: fall, spinal              OT Eval and Treat - 05/22/23 1028        Cognition    Orientation Status oriented to;person;place;time;situation     Affect/Mental Status WNL     Follows Commands WNL        Vision Assessment/Intervention    Vision Assessment corrective lenses for reading        Hearing Assessment    Hearing Status WNL        Sensory Assessment    Sensory Assessment sensation intact, upper extremities        Upper Extremity Assessment    UE Assessment ROM and Strength WFL        Motor Skills    Coordination WNL;opposition        Bed Mobility     Bed Mobility Activities supine to sit     [place] modified independence     Assistive Device none        Mobility Belt    Mobility Belt Used for All Out of Bed Activity yes        Sit/Stand Transfer    Surface chair with arm rests     [place] supervision     Assistive Device none     Transfer Comments noted pt education with use of RW vs without. pt requested to complete tasks without RW.        Toilet Transfer    Transfer Technique sit/stand     [place] supervision     Assistive Device ady, 3-in-1     Comment cuing for hand placement.        Functional Mobility    Distance in room/bathroom     Functional Mobility [place] supervision     Safety/Cues increased time to complete     Assistive Device none     Functional Mobility Comments noted small shuffling steps, and gaurded posture.        Lower Body Dressing    Tasks don;underwear     [place] supervision     Safety/Cues increased time to complete;energy conservation;maintaining precautions;compensatory techniques     Position supported sitting     Adaptive Equipment none        Grooming    Tasks washes, rinses and dries hands     [place] set up     Adaptive Equipment none        Toileting    Tasks adjust/manage clothing     [place] supervision     Safety/Cues compensatory techniques     Adaptive Equipment none        Balance    Static Sitting Balance WNL     Dynamic Sitting Balance WNL     Sit to Stand Dynamic Balance WNL     Static Standing Balance WNL     Dynamic Standing Balance WNL        Impairments/Safety Issues    Impairments Affecting Function pain                               Education Documentation  Self-Care, taught by Deya Pulido, RETA at 5/22/2023  1:35 PM.  Learner: Patient  Readiness: Acceptance  Method: Explanation  Response: Verbalizes Understanding, Demonstrated Understanding  Comment: role of OT, goals, BADLs, spinal precuations,        Session Outcome  Patient reclined, in chair at end of session,  chair alarm on, all needs met, personal items in reach, call light in reach. Nursing notified about patient's performance, patient's position, change in vital signs, and patient's response to therapy/activity.    AM-PAC™ - ADL (Current Function)     Putting on/taking off regular lower body clothing 3 - A Little   Bathing 3 - A Little   Toileting 3 - A Little   Putting on/taking off regular upper body clothing 4 - None   Help for taking care of personal grooming 4 - None   Eating meals 4 - None   AM-PAC™ ADL Score 21      ASSESSMENT AND PLAN     Progress Summary  OT evaluation complete. ADL AMPA 21. Patient presents with functional limitations affecting areas of ADL’s and functional transfers. Currently, patient performs functional mobility with supervision, and ADL tasks with supervision. Pt with guarded posture. Pt would benefit from skilled OT services to maximize safety and independence with daily tasks. Recommend  home with assist/ OT HH when medically stable.    Patient/Family Therapy Goal Statement: go home    OT Plan    Flowsheet Row Most Recent Value   Rehab Potential good, to achieve stated therapy goals at 05/22/2023 1028   Therapy Frequency 3-5 times/wk at 05/22/2023 1028   Planned Therapy Interventions activity tolerance training, adaptive equipment training, BADL retraining, IADL retraining, patient/caregiver education/training at 05/22/2023 1028          OT Discharge Recommendations    Flowsheet Row Most Recent Value   OT Recommended Discharge Disposition home with home health, home with assistance at 05/22/2023 1028   Anticipated Equipment Needs At Discharge (OT) shower chair at 05/22/2023 1028               OT Goals    Flowsheet Row Most Recent Value   Transfer Goal 1    Activity/Assistive Device toilet at 05/22/2023 1028   [place] modified independence at 05/22/2023 1028   Time Frame by discharge at 05/22/2023 1028   Progress/Outcome goal ongoing at 05/22/2023 1028   Dressing Goal 1     Activity/Adaptive Equipment dressing skills, all at 05/22/2023 1028   [place] modified independence at 05/22/2023 1028   Time Frame by discharge at 05/22/2023 1028   Progress/Outcome goal ongoing at 05/22/2023 1028   Toileting Goal 1    Activity/Assistive Device toileting skills, all at 05/22/2023 1028   [place] modified independence at 05/22/2023 1028   Time Frame by discharge at 05/22/2023 1028   Progress/Outcome goal ongoing at 05/22/2023 1028

## 2023-05-22 NOTE — PROGRESS NOTES
Hospital Medicine Service -  Daily Progress Note       SUBJECTIVE   Interval History: Feels ok. Describes a near syncopal episode overnight in the bathroom, although may have had a true syncopal epsiode as she does not recall the events. Notes back pain at present.     OBJECTIVE      Vital signs in last 24 hours:  Temp:  [36.6 °C (97.8 °F)-37.1 °C (98.7 °F)] 36.6 °C (97.9 °F)  Heart Rate:  [65-80] 67  Resp:  [16-22] 16  BP: ()/(53-89) 160/72    Intake/Output Summary (Last 24 hours) at 5/22/2023 1248  Last data filed at 5/22/2023 0928  Gross per 24 hour   Intake --   Output 1300 ml   Net -1300 ml       PHYSICAL EXAMINATION      Physical Exam  Constitutional:       General: She is not in acute distress.     Appearance: She is well-developed.   HENT:      Head: Normocephalic and atraumatic.   Cardiovascular:      Rate and Rhythm: Normal rate and regular rhythm.      Heart sounds: Murmur heard.   Pulmonary:      Effort: Pulmonary effort is normal.      Breath sounds: Normal breath sounds. No wheezing or rales.   Abdominal:      General: Bowel sounds are normal.      Palpations: Abdomen is soft.   Skin:     General: Skin is warm and dry.      Findings: No rash.   Neurological:      Mental Status: She is alert and oriented to person, place, and time.        LABS / IMAGING / TELE      Labs  Lab Results   Component Value Date    GLUCOSE 123 (H) 05/22/2023    CALCIUM 9.4 05/22/2023     (L) 05/22/2023    K 3.1 (L) 05/22/2023    CO2 27 05/22/2023     05/22/2023    BUN 18 05/22/2023    CREATININE 0.6 05/22/2023     Lab Results   Component Value Date    WBC 5.99 05/22/2023    HGB 12.6 05/22/2023    HCT 36.9 05/22/2023    MCV 91.3 05/22/2023     05/22/2023     No results found for: COVID19  No results found for: HSTROPONINI    Microbiology Results       ** No results found for the last 720 hours. **            Imaging  CT ANGIOGRAPHY HEAD/NECK WITH AND WITHOUT IV CONTRAST    Result Date:  5/21/2023  IMPRESSION: 1.  No high-grade stenosis or occlusion of the major intracranial arteries. 2.  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 3.  No high-grade stenosis of the cervical carotid or vertebral arteries. 4.  Left thyroid nodule measuring 2 cm. 5.  Patchy groundglass and interstitial opacities at the lung apices.    X-RAY THORACIC SPINE 2 VIEWS    Result Date: 5/21/2023  IMPRESSION: Age indeterminant T12 compression deformity, new from 2020. Multilevel spondylitic changes.     X-RAY LUMBAR SPINE 2 OR 3 VIEWS    Result Date: 5/21/2023  IMPRESSION: Age indeterminant T12 compression deformity, new from 2020. Multilevel spondylitic changes.     CT HEAD WITHOUT IV CONTRAST    Result Date: 5/21/2023  IMPRESSION: 1.  No acute intracranial hemorrhage. 2.  Cerebral volume loss and moderately severe subcortical and periventricular white matter changes that may be related to chronic microangiopathic disease. 3.  Probable 7 mm basilar tip aneurysm. Assessment with CTA can be considered for further evaluation. 4.  No acute fracture of the cervical spine. Reversal of the normal lordotic curvature with multilevel spondylitic changes as described.     CT CERVICAL SPINE WITHOUT IV CONTRAST    Result Date: 5/21/2023  IMPRESSION: 1.  No acute intracranial hemorrhage. 2.  Cerebral volume loss and moderately severe subcortical and periventricular white matter changes that may be related to chronic microangiopathic disease. 3.  Probable 7 mm basilar tip aneurysm. Assessment with CTA can be considered for further evaluation. 4.  No acute fracture of the cervical spine. Reversal of the normal lordotic curvature with multilevel spondylitic changes as described.           LINES, CATHETERS, DRAINS, AIRWAYS, AND WOUNDS   Lines, Drains, and Airways:  Wounds (agree with documentation and present on admission):  Peripheral IV (Adult) 05/21/23 Left Antecubital (Active)   Number of days: 1            ASSESSMENT AND PLAN      *  Ambulatory dysfunction  Assessment & Plan  Post mechanical fall on 5/21 new onset back pain, Age indeterminant T12 compression deformity without  retropulsion seen which is new from her most recent imaging in 2020.  Patient had difficulty with standing ambulating and symptomatic, diaphoretic.  MRI lumbar spine pending  PT OT evaluation  Fall precautions    Hyponatremia  Assessment & Plan  Hold HCTZ    Hypertension  Assessment & Plan  Blood pressure elevated, 207/84, takes pressure medication at home at night and most recently took her medications 5/20  BP improved, but now orthostatic  Stop HCTZ    Hyperlipidemia  Assessment & Plan  atorvastatin    Thyroid nodule  Assessment & Plan  2 mm thyroid nodule noted, patient with a history of multiple thyroid nodules in the past and follows with endocrinology  Follow-up with endocrinology outpatient    Aneurysm (CMS/[facility])  Assessment & Plan  7 mm basilar tip aneurysm noted on CT scan, plan for outpatient neurosurgery follow-up for angiogram      Age indeterminate compression fracture, therefore unclear if due to fall or due to osteoporosis       VTE Assessment: [place]    VTE Prophylaxis:  Current anticoagulants:  heparin (porcine) 5,000 unit/mL injection 5,000 Units, subcutaneous, q8h EVI      Code Status: Full Code      Estimated Discharge Date: 5/23/2023     Disposition Planning: PT/OT eval pending     Autumn Singh,   5/22/2023  12:48 PM

## 2023-05-22 NOTE — CONSULTS
REASON FOR CONSULT: Near syncope    CONSULT FROM: Autumn Singh DO    PRIMARY CARDIOLOGIST: Dr. Robin Miguel    ------------------------------------------------------------------------------------------------------------------------------------------  HISTORY OF PRESENTING ILLNESS  ------------------------------------------------------------------------------------------------------------------------------------------  Nelly Meyer is a 82 y.o. female, well-known to my partner, Dr. Robin Miguel from the office, who is admitted with mechanical fall.    # cardiac risk factors: HTN, hyperlipidemia  # CAD-calcium score is 407 - 1/2017  # No CHF  #PMH: anemia, syncopal episode in 8/2019-nml cardiac work up    Echo 11/10/2021: Normal LV with preserved systolic function.  LVEF 65%.  Mild LV hypertrophy, grade 1 diastolic dysfunction.  LA mildly dilated.  MV mild regurgitation.  TV mild regurgitation    NM Stress Test 6/11/2020: Low risk for ischemia, EF 79%      At baseline, the patient denies exertional chest pain, shortness of breath, orthopnea, PND, ankle edema, palpitations, or syncope.    Mrs. Meyer is an 82-year-old female who was admitted to the emergency department after standing on a stool in her bathroom to change a light bulb she lost her footing and fell backwards onto the tile striking the back of her head.  She denied loss of consciousness and recalls the entire event.  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  Neurosurgery has been consulted for this.She was also found to have a T12 compression deformity on xray.     Rapid response was called this morning for witnessed fall in the bathroom.  She was having significant lower back pain this morning when she woke.  She was cleaning herself up in the bathroom and woke up on the floor.  She does not recall how she got to the floor. She admits her oral intake has been low.  This morning  orthostatic vital signs were normal.  With OT she had 20 mmHg drop however pulse was not recorded with this.  Patient was asymptomatic.      The patient is followed by Dr. Miguel for hypertension and hyperlipidemia.  She has been worked up for 2 prior syncopal episodes that were considered to be vasovagal and not cardiac in nature.      ------------------------------------------------------------------------------------------------------------------------------------------  PAST MEDICAL HISTORY  ------------------------------------------------------------------------------------------------------------------------------------------  Past Medical History:   Diagnosis Date   • Anemia     iron def.   • Basal cell carcinoma     nose , Moh's surgery   • Constipation    • Gallstones    • GERD (gastroesophageal reflux disease)    • Hiatal hernia    • Hypertension    • Kidney stone     found by ultrasound, no problem presently   • Lipid disorder    • Osteoporosis    • Postmenopausal      Past Surgical History:   Procedure Laterality Date   • BREAST SURGERY      right breast segmental resection   • COLONOSCOPY     • ESOPHAGOGASTRODUODENOSCOPY     • MOHS SURGERY      nose   • ORIF ANKLE FRACTURE BIMALLEOLAR Left    • TEAR DUCT SURGERY Right     tear duct bypass       ------------------------------------------------------------------------------------------------------------------------------------------  MEDICATIONS  ------------------------------------------------------------------------------------------------------------------------------------------  Home medications    •  amLODIPine, Take 5 mg by mouth nightly.  •  aspirin, Take 81 mg by mouth nightly.  •  atorvastatin, Take 40 mg by mouth nightly.  •  cholecalciferol (vitamin D3), Take 1,000 Units by mouth nightly.  •  COLLAGEN-BIOTIN-ASCORBIC ACID ORAL, Take by mouth daily.  •  latanoprost, Administer 1 drop into both eyes nightly.  •  lisinopriL-hydrochlorothiazide,  Take 1 tablet by mouth nightly.  •  vit A/vit C/biotin/zinc/copper (HAIR-SKIN-NAIL,VIT A,C-BIOTIN, ORAL), Take 1 tablet by mouth daily.    Inpatient Medications    •  acetaminophen, 650 mg, oral, q6h VEI  •  amLODIPine, 5 mg, oral, Nightly  •  aspirin, 81 mg, oral, q AM  •  atorvastatin, 40 mg, oral, Nightly  •  cholecalciferol (vitamin D3), 1,000 Units, oral, Daily  •  glucose, 16-32 g of dextrose, oral, PRN **OR** dextrose, 15-30 g of dextrose, oral, PRN **OR** glucagon, 1 mg, intramuscular, PRN **OR** dextrose 50 % in water (D50), 25 mL, intravenous, PRN  •  docusate sodium, 100 mg, oral, BID  •  heparin (porcine), 5,000 Units, subcutaneous, q8h EVI  •  hydrALAZINE, 10 mg, intravenous, q4h PRN  •  ketorolac, 15 mg, intravenous, q6h EVI  •  lidocaine, 1 patch, Topical, Daily  •  [START ON 5/23/2023] lisinopriL, 20 mg, oral, Daily  •  magnesium sulfate, 2 g, intravenous, PRN  •  morphine, 2 mg, intravenous, q4h PRN  •  ondansetron, 4 mg, intravenous, q8h PRN  •  potassium chloride, 20 mEq, oral, PRN  •  potassium chloride, 40 mEq, oral, PRN  •  potassium chloride, 20 mEq, intravenous, PRN  •  potassium chloride in water, 20 mEq, intravenous, PRN **AND** potassium chloride in water, 20 mEq, intravenous, PRN  •  sodium chloride 0.9 %, , intravenous, Continuous    ------------------------------------------------------------------------------------------------------------------------------------------  ALLERGIES  ------------------------------------------------------------------------------------------------------------------------------------------  Patient has no known allergies.    ------------------------------------------------------------------------------------------------------------------------------------------  SOCIAL HISTORY  ------------------------------------------------------------------------------------------------------------------------------------------  No smoking or  alcohol    ------------------------------------------------------------------------------------------------------------------------------------------  FAMILY HISTORY  ------------------------------------------------------------------------------------------------------------------------------------------  No premature CAD    ------------------------------------------------------------------------------------------------------------------------------------------  REVIEW OF SYSTEMS  ------------------------------------------------------------------------------------------------------------------------------------------  Constitutional: - fever, - chills, - weakness, - weight loss, + mechanical fall  HEENT: - blurred vision, - sore throat, - hoarseness  Respiratory: - dyspnea, - cough, - hemoptysis  Cardiovascular: - chest pain, - dyspnea, - orthopnea, - PND, - edema, - palpitations, - syncope  Gastrointestinal: - nausea, - vomiting, - diarrhea, - hematemesis, - melena  Genitourinary: - dysuria, - frequency  Integument: - rash, - itching  Hematologic/lymphatic:  - bruising, - petechiae  Musculoskeletal: - arthalgias, - myalgias. +back pain  Neurological: - vertigo, - tremors, - headache, - speech deficit, - focal weakness  Behavioral/Psych: - anxiety, - depression  Endocrine: - cold intolerance, - heat intolerance, - weight change    ------------------------------------------------------------------------------------------------------------------------------------------  PHYSICAL EXAM  ------------------------------------------------------------------------------------------------------------------------------------------  VITAL SIGNS:  Temp:  [36.6 °C (97.8 °F)-37.1 °C (98.7 °F)] 36.6 °C (97.9 °F)  Heart Rate:  [65-80] 80  Resp:  [16-22] 16  BP: ()/(53-89) 146/70  SaO2: 96%    Intake/Output Summary (Last 24 hours) at 5/22/2023 1344  Last data filed at 5/22/2023 0928  Gross per 24 hour   Intake --   Output 1300  ml   Net -1300 ml       PHYSICAL EXAM:  General appearance: alert and cooperative  Head: without obvious abnormality  Eyes: PERRLA, extraocular movements intact  Neck: No JVD, carotid bruits, thyromegaly  Lungs: clear to auscultation bilaterally, no crackles or wheezing  Heart: regular rate and rhythm, S1-S2 normal, no murmurs, clicks, rubs or gallops  Abdomen: soft, non-tender, bowel sounds normal  Extremities: no edema, peripheral pulses present  Skin: Skin color, texture, turgor normal. No rashes or lesions  Neurologic: Alert and oriented X 3, no focal deficits    ------------------------------------------------------------------------------------------------------------------------------------------  LABS / IMAGING / EKG / TELEMETRY  ------------------------------------------------------------------------------------------------------------------------------------------  LABS:  Results from last 7 days   Lab Units 23  0354 23  1357   SODIUM mEQ/L 132* 132*   POTASSIUM mEQ/L 3.1* 3.7   MAGNESIUM mg/dL 2.0  --    CHLORIDE mEQ/L 100 98   CO2 mEQ/L 27 25   BUN mg/dL 18 20   CREATININE mg/dL 0.6 0.6   AST IU/L  --  36   ALT IU/L  --  34     Results from last 7 days   Lab Units 23  0354 23  1357   WBC K/uL 5.99 9.65   HEMOGLOBIN g/dL 12.6 14.1   HEMATOCRIT % 36.9 41.2   PLATELETS K/uL 187 212     No results found for: HGBA1C, TSH  No results found for: CHOL, LDLCALC, HDL, TRIG  No results found for: BNP    IMAGING: CT head: No ICH    EC2023 18:56   NSR w/1st degree AVB, nml axis, narrow QRS, NSST abnormality      TELEMETRY:  ------------------------------------------------------------------------------------------------------------------------------------------  ASSESSMENT AND PLAN  ------------------------------------------------------------------------------------------------------------------------------------------    1.  Syncope, vasovagal: Syncopal episode in the bathroom this  morning likely related to pain with change in position.  Orthostatic signs were normal this morning.  Second set of orthostatic signs revealed 20 mmHg drop however no pulse was correlated. Encourage oral hydration.  Work-up for prior episodes of syncope were vasovagal.  Echo 2021 revealed aortic sclerosis without stenosis and preserved left ventricular systolic function.  If symptoms persist may consider a 2-week event recorder or conceivably implantable loop recorder.    2.  Hypoklemia: K noted to be 3.1.  Most probably related to loop diuretic - HCTZ.  Replace to maintain potassium > 4.    3.  Hyponatremic: Continue to hold HCTZ.    4.  Hypertension: BPs have been elevated during hospital stay.  Could be related to pain.  Continue lisinopril 20 mg daily amlodipine 5 mg daily.  If necessary may uptitrate lisinopril dose and further increase amlodipine dose if needed.    5.  Hyperlipidemia: Continue atorvastatin 40 mg, low-carb diet discussed.  Score 407 in 2017.  Recommend LDL in the 100, ideally under 70.    I personally seen and evaluated the patient.  History and physical examination as well as comprehensive review of objective data including labs, radiologic studies, EKG, telemetry were performed and thoroughly reviewed by me personally.  Plan formulated and reviewed, transcribed above by my nurse practitioner, Jacquie Teixeira.    Dr. Miguel will resume care of this patient as of tomorrow.    Vishnu Hawthorne MD, [facility]        5/22/2023    Primary Care Doctor: Shahab Palacio MD

## 2023-05-22 NOTE — PLAN OF CARE
Plan of Care Review  Plan of Care Reviewed With: patient  Progress: improving  Outcome Summary: AAOx3, VSS on RA, lower back pain tolerable w/ scheduled medications and abdominal binder. ambulates w/ stand-by assistance. tolerating diet, voiding w/o difficulty, belching and passing gas, no BM this shift. resting in bed, bed alarm present, call bell in reach.

## 2023-05-22 NOTE — CONSULTS
Neurosurgery Consultation    CC:   Chief Complaint   Patient presents with   • Fall       Reason for Consultation:  Aneurysm     Requesting Provider:  MARIA TERESA Williamson   Patient seen and examined 5/22/23 0800    History of Present Illness:   Nelly Meyer is a 82 y.o.  female, with significant past medical history of HTN, HLD, and osteoporosis, who presented to [facility] ED for evaluation s/p fall. Patient states she was standing on a stool in her bathroom changing a sconce light bulb when she lost her footing and fell backwards onto the tile, striking the back of her head. She denies loss of consciousness and recalls the entire event. Head CT was completed upon arrival to the ED, followed by CTA, showing a 7mm basilar tip aneurysm, prompting neurointervention consult.    Upon interview, patient states she does have midback discomfort from T12 fracture. She denies headaches, visual changes, nausea, vomiting, weakness paresthesias. She was unaware of having an aneurysm. She denies family history of aneurysms. She takes Aspirin 81mg daily but no other anticoagulation or antiplatelet medication.    Medical History:   Past Medical History:   Diagnosis Date   • Anemia     iron def.   • Basal cell carcinoma     nose , Moh's surgery   • Constipation    • Gallstones    • GERD (gastroesophageal reflux disease)    • Hiatal hernia    • Hypertension    • Kidney stone     found by ultrasound, no problem presently   • Lipid disorder    • Osteoporosis    • Postmenopausal        Surgical History:   Past Surgical History:   Procedure Laterality Date   • BREAST SURGERY      right breast segmental resection   • COLONOSCOPY     • ESOPHAGOGASTRODUODENOSCOPY     • MOHS SURGERY      nose   • ORIF ANKLE FRACTURE BIMALLEOLAR Left    • TEAR DUCT SURGERY Right     tear duct bypass       Family History: History reviewed. No pertinent family history.    Social History:   Social History     Socioeconomic History   • Marital status: Single      Spouse name: None   • Number of children: None   • Years of education: None   • Highest education level: None   Tobacco Use   • Smoking status: Former   • Smokeless tobacco: Never   Substance and Sexual Activity   • Alcohol use: Yes     Alcohol/week: 7.0 standard drinks of alcohol     Types: 7 Glasses of wine per week     Comment: occasional   • Drug use: No   • Sexual activity: Defer     Social Determinants of Health     Food Insecurity: No Food Insecurity (5/21/2023)    Hunger Vital Sign    • Worried About Running Out of Food in the Last Year: Never true    • Ran Out of Food in the Last Year: Never true       Allergies: Patient has no known allergies.    Home Medications:   •  amLODIPine, Take 5 mg by mouth nightly.  •  aspirin, Take 81 mg by mouth nightly.  •  atorvastatin, Take 40 mg by mouth nightly.  •  cholecalciferol (vitamin D3), Take 1,000 Units by mouth nightly.  •  COLLAGEN-BIOTIN-ASCORBIC ACID ORAL, Take by mouth daily.  •  latanoprost, Administer 1 drop into both eyes nightly.  •  lisinopriL-hydrochlorothiazide, Take 1 tablet by mouth nightly.  •  vit A/vit C/biotin/zinc/copper (HAIR-SKIN-NAIL,VIT A,C-BIOTIN, ORAL), Take 1 tablet by mouth daily.    Current Medications:   •  acetaminophen, 650 mg, oral, q6h EVI  •  amLODIPine, 5 mg, oral, Nightly  •  aspirin, 81 mg, oral, q AM  •  atorvastatin, 40 mg, oral, Nightly  •  cholecalciferol (vitamin D3), 1,000 Units, oral, Daily  •  glucose, 16-32 g of dextrose, oral, PRN **OR** dextrose, 15-30 g of dextrose, oral, PRN **OR** glucagon, 1 mg, intramuscular, PRN **OR** dextrose 50 % in water (D50), 25 mL, intravenous, PRN  •  docusate sodium, 100 mg, oral, BID  •  heparin (porcine), 5,000 Units, subcutaneous, q8h EVI  •  hydrALAZINE, 10 mg, intravenous, q4h PRN  •  ketorolac, 15 mg, intravenous, q6h EVI  •  lidocaine, 1 patch, Topical, Daily  •  lisinopriL (PRINIVIL) 20 mg, hydrochlorothiazide (HYDRODIURIL) 25 mg for ZESTORETIC 20-25, , oral, Daily  •   magnesium sulfate, 2 g, intravenous, PRN  •  morphine, 2 mg, intravenous, q4h PRN  •  ondansetron, 4 mg, intravenous, q8h PRN  •  potassium chloride, 20 mEq, oral, PRN  •  potassium chloride, 40 mEq, oral, PRN  •  potassium chloride, 20 mEq, intravenous, PRN  •  potassium chloride in water, 20 mEq, intravenous, PRN **AND** potassium chloride in water, 20 mEq, intravenous, PRN    Review of Systems: A 14 point review of systems was performed and aside from what is mentioned above is otherwise negative.    General physical examination:  Temp:  [35.8 °C (96.5 °F)-37.1 °C (98.7 °F)] 36.6 °C (97.9 °F)  Heart Rate:  [65-87] 73  Resp:  [16-22] 16  BP: ()/(53-89) 136/69     The patient is supine in her bed in no acute distress, appears her stated age.   There is no peripheral edema and there are 2+ radial and dorsal pedis pulses.      General Appearance: Alert and awake   Head: Normocephalic, atraumatic.   Eyes:  ENMT: No conjunctival injection. Symmetrical lids  Atraumatic external nose and ears. Moist MM.   Neck: Supple, no nuchal rigidity.   Respiratory: Good respiratory effort.   Cardiovascular: Regular rate.   Abdomen: Soft without distension   Extremities: No edema.   Skin: Dry, no rashes.       Neurologic examination:  Mental status:  The patient is alert, attentive, and oriented. Speech is clear and fluent with good repetition, comprehension, and naming.  Remote and recent memory are normal as well as fund of knowledge.    Cranial nerves:  CN II: Pupils are equal and briskly reactive to light.   CN III, IV, VI: Extra-occular muscles are intact  CN V: Facial sensation is intact and equal in V1-V3 distributions bilaterally.   CN VII: Face is symmetric with normal eye closure and smile.  CN VIII: Hearing is normal to rubbing fingers  CN IX, X: Palate elevates symmetrically. Phonation is normal.  CN XI: Head turning and shoulder shrug are intact  CN XII: Tongue is midline with normal movements and no  atrophy.    Motor:  There is no pronator drift. Muscle bulk and tone are normal.      Deltoid Biceps Triceps Wrist ext Finger ext Hand Intrinsics Hip flexion Knee ext Dorsi-  flexion EHL Plantar Flexion   R 5 5 5 5 5 5 5 5 5 5 5   L 5 5 5 5 5 5 5 5 5 5 5       Reflexes:  There is no Segura's sign or ankle clonus.    Sensory:  Intact light touch throughout all 4 extremities.    Coordination:  There is no dysmetria on finger-to-nose testing.      Gait:  Deferred due to thoracic fracture.      Data Review:    Labs  WBC   Date Value Ref Range Status   05/22/2023 5.99 3.80 - 10.50 K/uL Final     Hemoglobin   Date Value Ref Range Status   05/22/2023 12.6 11.8 - 15.7 g/dL Final     Hematocrit   Date Value Ref Range Status   05/22/2023 36.9 35.0 - 45.0 % Final     MCV   Date Value Ref Range Status   05/22/2023 91.3 83.0 - 98.0 fL Final     Platelets   Date Value Ref Range Status   05/22/2023 187 150 - 369 K/uL Final     Sodium   Date Value Ref Range Status   05/22/2023 132 (L) 136 - 144 mEQ/L Final     Potassium   Date Value Ref Range Status   05/22/2023 3.1 (L) 3.6 - 5.1 mEQ/L Final     BUN   Date Value Ref Range Status   05/22/2023 18 8 - 20 mg/dL Final     Creatinine   Date Value Ref Range Status   05/22/2023 0.6 0.6 - 1.1 mg/dL Final       All labs were personally reviewed and interpreted at time of note.     Images  CT ANGIOGRAPHY HEAD/NECK WITH AND WITHOUT IV CONTRAST    Result Date: 5/21/2023  IMPRESSION: 1.  No high-grade stenosis or occlusion of the major intracranial arteries. 2.  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 3.  No high-grade stenosis of the cervical carotid or vertebral arteries. 4.  Left thyroid nodule measuring 2 cm. 5.  Patchy groundglass and interstitial opacities at the lung apices.    X-RAY THORACIC SPINE 2 VIEWS, X-RAY LUMBAR SPINE 2 OR 3 VIEWS    Result Date: 5/21/2023  IMPRESSION: Age indeterminant T12 compression deformity, new from 2020. Multilevel spondylitic changes.     CT HEAD  WITHOUT IV CONTRAST, CT CERVICAL SPINE WITHOUT IV CONTRAST    Result Date: 5/21/2023  IMPRESSION: 1.  No acute intracranial hemorrhage. 2.  Cerebral volume loss and moderately severe subcortical and periventricular white matter changes that may be related to chronic microangiopathic disease. 3.  Probable 7 mm basilar tip aneurysm. Assessment with CTA can be considered for further evaluation. 4.  No acute fracture of the cervical spine. Reversal of the normal lordotic curvature with multilevel spondylitic changes as described.     All imaging was personally reviewed and interpreted at time of note.     Assessment and Plan:  In summary, Nelly Meyer is a 82 y.o. female with past medical history of HTN, HLD, and osteoporosis who presents for evaluation s/p mechanical fall. Head CT and CTA were performed, showing a 7 mm basilar tip aneurysm, which is a new diagnosis for her. She denies headaches, visual changes, nausea, vomiting, and weakness. On exam, she is neurologically intact without weakness or cranial nerve deficit. No acute neurointervention. Patient will need an angiogram as an outpatient for further evaluation of her aneurysm, followed by likely treatment of the aneurysm. Follow up with Dr. Rain this week for angio planning.     TAMIE Frederick    D/w Dr. Rain.

## 2023-05-22 NOTE — HOSPITAL COURSE
Nelly is a 82 y.o. female admitted on 5/21/2023 with Basilar artery aneurysm (CMS/Formerly [facility]) [I72.5]  Near syncope [R55]  T12 compression fracture, initial encounter (CMS/Formerly [facility]) [S22.080A]  Fall, initial encounter [W19.XXXA]  Ambulatory dysfunction [R26.2]. Principal problem is Ambulatory dysfunction.    Past Medical History  Nelly has a past medical history of Anemia, Basal cell carcinoma, Constipation, Gallstones, GERD (gastroesophageal reflux disease), Hiatal hernia, Hypertension, Kidney stone, Lipid disorder, Osteoporosis, and Postmenopausal.    History of Present Illness   Nelly Meyer is a 82 y.o.  female, with significant past medical history of HTN, HLD, and osteoporosis, who presented to [facility] ED for evaluation s/p fall. Patient states she was standing on a stool in her bathroom changing a sconce light bulb when she lost her footing and fell backwards onto the tile, striking the back of her head. She denies loss of consciousness and recalls the entire event. Head CT was completed upon arrival to the ED, followed by CTA, showing a 7mm basilar tip aneurysm, prompting neurointervention consult.     Upon interview, patient states she does have midback discomfort from T12 fracture. She denies headaches, visual changes, nausea, vomiting, weakness paresthesias. She was unaware of having an aneurysm. She denies family history of aneurysms. She takes Aspirin 81mg daily but no other anticoagulation or antiplatelet medication.

## 2023-05-22 NOTE — PLAN OF CARE
Problem: Adult Inpatient Plan of Care  Goal: Plan of Care Review  Outcome: Progressing  Flowsheets (Taken 5/22/2023 4533)  Progress: improving  Plan of Care Reviewed With: patient     Problem: Self-Care Deficit  Goal: Improved Ability to Complete Activities of Daily Living  Outcome: Progressing   OT evaluation complete. ADL [facility] 21. Patient presents with functional limitations affecting areas of ADL’s and functional transfers. Currently, patient performs functional mobility with supervision, and ADL tasks with supervision. Pt with guarded posture. Pt would benefit from skilled OT services to maximize safety and independence with daily tasks. Recommend  home with assist/ OT HH when medically stable.

## 2023-05-22 NOTE — PROGRESS NOTES
She does note that the back pain is improved and is managed well with Tylenol.  She feels though she progressed well with physical therapy and does feel comfortable going home and resume her activities under the direction of the physical therapist.    I did review with her the fracture and the excellent healing potential over time.  He did review surgical stabilization with a kyphoplasty procedure if the pain is debilitating but at this point she does feel improved and has an excellent healing potential over time without intervention and therefore the MRI will be canceled and she may progress activities as tolerated continue the Tylenol for pain management and the binder for bracing and she will follow-up as an outpatient in 3 weeks in the office    Artie May JR, MD

## 2023-05-22 NOTE — PLAN OF CARE
Care Coordination Admission Assessment Note  Date: 5/22/2023   Time: 9:03 AM    Patient Name: Nelly Meyer  Medical Record Number: [id number]  YOB: 1940  Room/BED: 4220    General Information  Patient-Specific Goals (Include Timeframe)     PCP: Shahab Palacio MD   Insurance Coverage: IBC  Readmission Within the last 30 days  no previous admission in last 30 days    Living Arrangements  Arrived From: home  Current Living Arrangements: home  People in Home: alone  Home Accessibility: wheelchair accessible, ramp to enter home  Able to Return to Prior Arrangements: yes    Housing Stability and Financial Resources (SDOH)  In the last 12 months, was there a time when you were not able to pay the mortgage or rent on time?: No  In the last 12 months, was there a time when you did not have a steady place to sleep or slept in a shelter (including now)?: No  How hard is it for you to pay for the very basics like food, housing, medical care, and heating?: Not hard at all    Assistive Device/Animal Currently Used at Home  none    Concerns to be Addressed  care coordination/care conferences, discharge planning    Anticipated Changes Related to Illness  none    Transportation Concerns (SDOH)  Transportation Concerns: car, none  Transportation Anticipated: family or friend will provide  In the past 12 months, has lack of transportation kept you from medical appointments or from getting medications?: No  In the past 12 months, has lack of transportation kept you from meetings, work, or from getting things needed for daily living?: No    Anticipated Discharge Plan   Anticipated Discharge Disposition: home with assistance, home with home health  Patient/Family Anticipates Transition to: home with help/services  Patient/Family Anticipated Services at Transition: none  Met with patient. Provided education and contact information for Care Coordination services.: yes  Screening complete: yes    Pending  therapy, patient would like to go home.  Never used home care nor been to a SNF.

## 2023-05-22 NOTE — PROGRESS NOTES
Patient: Nelly Meyer  Location:  [facility] 4B 4220  MRN:  [id number]  Today's date:  5/22/2023    Nelly is a 82 y.o. 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 Principal problem is Ambulatory dysfunction.    Past Medical History  Nelly has a past medical history of Anemia, Basal cell carcinoma, Constipation, Gallstones, GERD (gastroesophageal reflux disease), Hiatal hernia, Hypertension, Kidney stone, Lipid disorder, Osteoporosis, and Postmenopausal.    History of Present Illness   Nelly Meyer is a 82 y.o.  female, with significant past medical history of HTN, HLD, and osteoporosis, who presented to [place] ED for evaluation s/p fall. Patient states she was standing on a stool in her bathroom changing a sconce light bulb when she lost her footing and fell backwards onto the tile, striking the back of her head. She denies loss of consciousness and recalls the entire event. Head CT was completed upon arrival to the ED, followed by CTA, showing a 7mm basilar tip aneurysm, prompting neurointervention consult.     Upon interview, patient states she does have midback discomfort from T12 fracture. She denies headaches, visual changes, nausea, vomiting, weakness paresthesias. She was unaware of having an aneurysm. She denies family history of aneurysms. She takes Aspirin 81mg daily but no other anticoagulation or antiplatelet medication.      PT Vitals    Date/Time Pulse HR Source SpO2 O2 Therapy BP BP Location BP Method Pt Position Who   05/22/23 1211 64 Monitor 98 % None (Room air) 151/69 Right upper arm Automatic Sitting VBD   05/22/23 1220 74 Monitor 98 % None (Room air) 148/72 Right upper arm Automatic Standing VBD   05/22/23 1231 74 Monitor 98 % None (Room air) 148/64 Right upper arm Automatic Sitting  VBD      PT Pain    Date/Time Pain Type Location Rating: Rest Rating: Activity Who   05/22/23 1211 Pain Assessment back 2 - mild pain 2 - mild pain VBD   05/22/23 1231 Post Activity back 2 - mild pain 2 - mild pain VBD          Prior Living Environment    Flowsheet Row Most Recent Value   People in Home alone   Current Living Arrangements home   Home Accessibility wheelchair accessible, ramp to enter home   Living Environment Comment condo, no steps to enter, tub/ shower combo with grab bars        Prior Level of Function    Flowsheet Row Most Recent Value   Dominant Hand right   Ambulation independent   Transferring independent   Toileting independent   Bathing independent   Dressing independent   Eating independent   IADLs independent   Driving/Transportation    Assistive Device/Animal Currently Used at Home none           PT Evaluation and Treatment - 05/22/23 1210        PT Time Calculation    Start Time 1210     Stop Time 1235     Time Calculation (min) 25 min        General Information    Document Type Initial Evaluation     Mode of Treatment individual therapy;physical therapy     Position at Start of Session reclined;in chair     Status at Start of Session agreeable to therapy;no issues or concerns identified by nurse prior to session     General Observations of Patient +having lunch but motvated to take a break for PT session or MRI trip, NAD        Precautions/Limitations/Impairments    Existing Precautions/Restrictions fall;spinal         Services    Do You Speak a Language Other Than English at Home? no        Cognition/Psychosocial    Affect/Mental Status WFL     Orientation Status oriented x 4     Follows Commands WFL        Sensory    Hearing Status WFL        Vision Assessment/Intervention    Vision Assessment WFL;corrective lenses for reading        Sensory Assessment    Sensory Assessment sensation intact, lower extremities        Bed Mobility    Comment NT; as received  and left sitting in bedside chair; pt reported no concerns for getting in and out of bed        Mobility Belt    Mobility Belt Used for All Out of Bed Activity yes        Transfers    Comment sit to/from stand with Mod I with no AD; initial attempt with mobility belt for safety; pt demonstrated improved mobility post initial 15ft of ambulation, mobility belt discontinued and transfers re-assessed to be Mod I with no AD        Gait Training    Raymond, Gait modified independence     Safety/Cues increased time to complete     Assistive Device none     Distance in Feet 100 feet     Pattern step-through     Deviations/Abnormal Patterns gait speed decreased;step length decreased   guarded trunk/pelvic rotation, diminished arm swings       Stairs Training    Comment NT; not a barrier to D/C        Impairments/Safety Issues    Impairments Affecting Function pain     Functional Endurance Fair        Balance    Static Sitting Balance WNL     Dynamic Sitting Balance WNL     Sit to Stand Dynamic Balance WNL     Static Standing Balance WNL     Dynamic Standing Balance WNL;unsupported     Balance Test Results Tinetti Balance & Gait Evaluation     Comment, Balance no AD        Tinetti    Sitting Balance Steady, safe     Arises Able, uses arms to help     Attempts to Arise Able to arise, one attempt     Immediate Standing Balance (First 5 Seconds) Steady without walker or other support     Standing Balance Narrow stance without support     Nudged Steady without walker or other support     Eyes Closed Steady     Turned 360 Degrees: Steadiness Steady     Turned 360 Degrees: Continuity of Steps Continuous     Sitting Down Safe, smooth motion     Balance Score 15     Initiation of Gait No hesitancy     Step Height: R Swing Foot Right foot complete clears floor     Step Length: R Swing Foot Passes left stance foot     Step Height: L Swing Foot Left foot complete clears floor     Step Length: L Swing Foot Passes right stance foot      Step Symmetry Right and left step appear equal     Step Continuity Steps appear continuous     Path Straight without walking aid     Trunk No sway, no flexion, no use of arms, no walking aid     Walking Time Heels almost touching while walking     Gait Score 12     Total Score 27        AM-PAC™ - Mobility (Current Function)    Turning from your back to your side while in a flat bed without using bedrails? 4 - None     Moving from lying on your back to sitting on the side of a flat bed without using bedrails? 4 - None     Moving to and from a bed to a chair? 4 - None     Standing up from a chair using your arms? 4 - None     To walk in a hospital room? 4 - None     Climbing 3-5 steps with a railing? 3 - A Little     AM-PAC™ Mobility Score 23        Session Outcome    Daily Outcome Statement [facility] 23/24 Pt to [facility] s/p fall presents close to her baseline for functional mobility. Pt presented independent for transfers, and ambulation and scored 23/24 in [facility]. Pt identified with no need for inpatient bedside skilled PT at this time. Cleared for bedside PT.     Position at End of Session upright;in chair     Status at End of Session chair alarm on;all needs met;call light in reach;personal items in reach     Nursing Notified patient's performance;patient's position;patient's response to therapy/activity        Plan    Therapy Frequency evaluation only               PT Discharge Recommendations    Flowsheet Row Most Recent Value   PT Recommended Discharge Disposition home with home health, home with outpatient services at 05/22/2023 1210   Anticipated Equipment Needs at Discharge (PT) none at 05/22/2023 1210   Patient/Family Therapy Goals Statement to go home and feel better at 05/22/2023 1210                  Education Documentation  Treatment Plan, taught by Carlos Faye, PT at 5/22/2023  4:25 PM.  Learner: Patient  Readiness: Acceptance  Method: Explanation  Response: Verbalizes Understanding  Comment:  Pt was educated regarding the role of PT in acute care, energy conservation, self-awareness, and POC

## 2023-05-22 NOTE — PLAN OF CARE
Problem: Adult Inpatient Plan of Care  Goal: Plan of Care Review  Outcome: Progressing  Flowsheets (Taken 5/22/2023 1626)  Progress: improving  Plan of Care Reviewed With: patient  Outcome Summary:   PT evaluation complete   Pt presented Mod I for transfers, and ambulation with no AD   D/C rec home with home services   Pt at her baseline for functional mobility. Pt identified with no need for inpatient bedside skilled PT at this time.  Goal: Patient-Specific Goal (Individualized)  Outcome: Progressing  Flowsheets (Taken 5/22/2023 2076)  Patient-Specific Goals (Include Timeframe): to go home     Problem: Mobility Impairment  Goal: Optimal Mobility  Outcome: Progressing

## 2023-05-23 VITALS
OXYGEN SATURATION: 97 % | RESPIRATION RATE: 16 BRPM | HEART RATE: 61 BPM | TEMPERATURE: 97.5 F | HEIGHT: 67 IN | SYSTOLIC BLOOD PRESSURE: 127 MMHG | DIASTOLIC BLOOD PRESSURE: 70 MMHG | WEIGHT: 160 LBS | BODY MASS INDEX: 25.11 KG/M2

## 2023-05-23 LAB
ANION GAP SERPL CALC-SCNC: 6 MEQ/L (ref 3–15)
BASOPHILS # BLD: 0.05 K/UL (ref 0.01–0.1)
BASOPHILS NFR BLD: 1.1 %
BUN SERPL-MCNC: 27 MG/DL (ref 8–20)
CALCIUM SERPL-MCNC: 9 MG/DL (ref 8.9–10.3)
CHLORIDE SERPL-SCNC: 100 MEQ/L (ref 98–109)
CO2 SERPL-SCNC: 25 MEQ/L (ref 22–32)
CREAT SERPL-MCNC: 0.6 MG/DL (ref 0.6–1.1)
DIFFERENTIAL METHOD BLD: ABNORMAL
EOSINOPHIL # BLD: 0.32 K/UL (ref 0.04–0.36)
EOSINOPHIL NFR BLD: 7.1 %
ERYTHROCYTE [DISTWIDTH] IN BLOOD BY AUTOMATED COUNT: 12.6 % (ref 11.7–14.4)
GFR SERPL CREATININE-BSD FRML MDRD: >60 ML/MIN/1.73M*2
GLUCOSE SERPL-MCNC: 100 MG/DL (ref 70–99)
HCT VFR BLDCO AUTO: 34.7 % (ref 35–45)
HGB BLD-MCNC: 11.7 G/DL (ref 11.8–15.7)
IMM GRANULOCYTES # BLD AUTO: 0.01 K/UL (ref 0–0.08)
IMM GRANULOCYTES NFR BLD AUTO: 0.2 %
LYMPHOCYTES # BLD: 1.35 K/UL (ref 1.2–3.5)
LYMPHOCYTES NFR BLD: 29.8 %
MCH RBC QN AUTO: 31.2 PG (ref 28–33.2)
MCHC RBC AUTO-ENTMCNC: 33.7 G/DL (ref 32.2–35.5)
MCV RBC AUTO: 92.5 FL (ref 83–98)
MONOCYTES # BLD: 0.48 K/UL (ref 0.28–0.8)
MONOCYTES NFR BLD: 10.6 %
NEUTROPHILS # BLD: 2.32 K/UL (ref 1.7–7)
NEUTS SEG NFR BLD: 51.2 %
NRBC BLD-RTO: 0 %
PDW BLD AUTO: 9.3 FL (ref 9.4–12.3)
PLATELET # BLD AUTO: 171 K/UL (ref 150–369)
POTASSIUM SERPL-SCNC: 3.9 MEQ/L (ref 3.6–5.1)
RBC # BLD AUTO: 3.75 M/UL (ref 3.93–5.22)
SODIUM SERPL-SCNC: 131 MEQ/L (ref 136–144)
WBC # BLD AUTO: 4.53 K/UL (ref 3.8–10.5)

## 2023-05-23 PROCEDURE — 63700000 HC SELF-ADMINISTRABLE DRUG: Performed by: NURSE PRACTITIONER

## 2023-05-23 PROCEDURE — 85025 COMPLETE CBC W/AUTO DIFF WBC: CPT | Performed by: HOSPITALIST

## 2023-05-23 PROCEDURE — 63600000 HC DRUGS/DETAIL CODE: Performed by: NURSE PRACTITIONER

## 2023-05-23 PROCEDURE — 63700000 HC SELF-ADMINISTRABLE DRUG: Performed by: HOSPITALIST

## 2023-05-23 PROCEDURE — 80048 BASIC METABOLIC PNL TOTAL CA: CPT | Performed by: HOSPITALIST

## 2023-05-23 PROCEDURE — 99238 HOSP IP/OBS DSCHRG MGMT 30/<: CPT | Performed by: HOSPITALIST

## 2023-05-23 PROCEDURE — 36415 COLL VENOUS BLD VENIPUNCTURE: CPT | Performed by: HOSPITALIST

## 2023-05-23 PROCEDURE — 63600000 HC DRUGS/DETAIL CODE

## 2023-05-23 PROCEDURE — 63700000 HC SELF-ADMINISTRABLE DRUG

## 2023-05-23 RX ORDER — LISINOPRIL 20 MG/1
20 TABLET ORAL NIGHTLY
Qty: 30 TABLET | Refills: 1 | Status: SHIPPED | OUTPATIENT
Start: 2023-05-23 | End: 2024-03-05

## 2023-05-23 RX ORDER — ACETAMINOPHEN 500 MG
500 TABLET ORAL EVERY 6 HOURS PRN
Start: 2023-05-23 | End: 2024-03-05

## 2023-05-23 RX ORDER — LIDOCAINE 560 MG/1
1 PATCH PERCUTANEOUS; TOPICAL; TRANSDERMAL DAILY PRN
Start: 2023-05-23 | End: 2024-03-05

## 2023-05-23 RX ADMIN — HEPARIN SODIUM 5000 UNITS: 5000 INJECTION, SOLUTION INTRAVENOUS; SUBCUTANEOUS at 05:37

## 2023-05-23 RX ADMIN — Medication 1000 UNITS: at 09:25

## 2023-05-23 RX ADMIN — ACETAMINOPHEN 650 MG: 325 TABLET, FILM COATED ORAL at 11:28

## 2023-05-23 RX ADMIN — KETOROLAC TROMETHAMINE 15 MG: 15 INJECTION, SOLUTION INTRAMUSCULAR; INTRAVENOUS at 05:37

## 2023-05-23 RX ADMIN — KETOROLAC TROMETHAMINE 15 MG: 15 INJECTION, SOLUTION INTRAMUSCULAR; INTRAVENOUS at 00:14

## 2023-05-23 RX ADMIN — DOCUSATE SODIUM 100 MG: 100 CAPSULE, LIQUID FILLED ORAL at 09:25

## 2023-05-23 RX ADMIN — LISINOPRIL 20 MG: 20 TABLET ORAL at 09:25

## 2023-05-23 RX ADMIN — LIDOCAINE 1 PATCH: 4 PATCH TOPICAL at 09:25

## 2023-05-23 RX ADMIN — ACETAMINOPHEN 650 MG: 325 TABLET, FILM COATED ORAL at 05:37

## 2023-05-23 RX ADMIN — ASPIRIN 81 MG: 81 TABLET, COATED ORAL at 09:25

## 2023-05-23 RX ADMIN — ACETAMINOPHEN 650 MG: 325 TABLET, FILM COATED ORAL at 00:14

## 2023-05-23 NOTE — DISCHARGE SUMMARY
[facility] Medicine Service -  Inpatient Discharge Summary        BRIEF OVERVIEW   Admitting Provider: Autumn Singh DO  Attending Provider: Autumn Singh DO Attending phys phone: [phone]    PCP: Shahab Palacio [phone]    Admission Date: 5/21/2023  Discharge Date: 5/23/2023     DISCHARGE DIAGNOSES      Primary Discharge Diagnosis  Ambulatory dysfunction    Secondary Discharge Diagnoses  Active Hospital Problems    Diagnosis Date Noted   • Ambulatory dysfunction 05/21/2023     Priority: High   • Aneurysm (CMS/HCC) 05/21/2023     Priority: Medium   • Thyroid nodule 05/21/2023     Priority: Medium   • Hyperlipidemia 05/21/2023     Priority: Medium   • Hypertension 05/21/2023     Priority: Medium   • Hyponatremia 05/21/2023     Priority: Medium   • T12 compression fracture, initial encounter (CMS/[facility]) 05/22/2023      Resolved Hospital Problems   No resolved problems to display.     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      Hospital Course  82-year-old female with a history of chronic anemia chronic constipation GERD HTN HLD osteoporosis presented after near syncope and fall. Patient with age indeterminate compression fracture. Found to have mild hyponatremia and HCTZ discontinued. Initially orthostatic on 5/22, however on 5/23 orthostasis resolved and BP improved. Patient had incidental finding of basilar artery aneurysm and will follow-up outpatient with neurosurgery.    Exam on Day of  Discharge  Physical Exam  Vitals and nursing note reviewed.   Constitutional:       General: She is not in acute distress.     Appearance: She is well-developed.   HENT:      Head: Normocephalic and atraumatic.   Eyes:      General: No scleral icterus.     Pupils: Pupils are equal, round, and reactive to light.   Skin:     General: Skin is warm and dry.      Findings: No rash.   Neurological:      Mental Status: She is alert and oriented to person, place, and time.         Assessment and Plan  * Ambulatory dysfunction  Assessment & Plan  Post mechanical fall on 5/21 new onset back pain, Age indeterminant T12 compression deformity without  retropulsion seen which is new from her most recent imaging in 2020.  Patient had difficulty with standing ambulating and symptomatic, diaphoretic.  MRI lumbar spine pending  PT OT evaluation  Fall precautions    Hyponatremia  Assessment & Plan  Hold HCTZ    Hypertension  Assessment & Plan  Blood pressure elevated, 207/84, takes pressure medication at home at night and most recently took her medications 5/20  BP improved, but now orthostatic  Stop HCTZ    Hyperlipidemia  Assessment & Plan  atorvastatin    Thyroid nodule  Assessment & Plan  2 mm thyroid nodule noted, patient with a history of multiple thyroid nodules in the past and follows with endocrinology  Follow-up with endocrinology outpatient    Aneurysm (CMS/[facility])  Assessment & Plan  7 mm basilar tip aneurysm noted on CT scan, plan for outpatient neurosurgery follow-up for angiogram          Consults During Admission  IP CONSULT TO SOCIAL WORK  IP CONSULT TO ORTHOPEDIC SURGERY  IP CONSULT TO NEUROSURGERY  IP CONSULT TO CARDIOLOGY    DISCHARGE MEDICATIONS      Medication List      START taking these medications    acetaminophen 500 mg tablet  Commonly known as: TYLENOL  Take 1 tablet (500 mg total) by mouth every 6 (six) hours as needed for pain.  Dose: 500 mg     lidocaine 4 % adhesive patch,medicated topical patch  Commonly  known as: ASPERCREME  Apply 1 patch topically daily as needed (back pain).  Dose: 1 patch     lisinopriL 20 mg tablet  Commonly known as: PRINIVIL  Take 1 tablet (20 mg total) by mouth nightly.  Dose: 20 mg        CONTINUE taking these medications    amLODIPine 5 mg tablet  Commonly known as: NORVASC  Take 5 mg by mouth nightly.  Dose: 5 mg     aspirin 81 mg enteric coated tablet  Take 81 mg by mouth nightly.  Dose: 81 mg     atorvastatin 40 mg tablet  Commonly known as: LIPITOR  Take 40 mg by mouth nightly.  Dose: 40 mg     COLLAGEN-BIOTIN-ASCORBIC ACID ORAL  Take by mouth daily.     HAIR-SKIN-NAIL(VIT A,C-BIOTIN) ORAL  Take 1 tablet by mouth daily.  Dose: 1 tablet     latanoprost 0.005 % drops, emulsion  Administer 1 drop into both eyes nightly.  Dose: 1 drop     VITAMIN D3 25 mcg (1,000 unit) capsule  Take 1,000 Units by mouth nightly.  Dose: 1,000 Units  Generic drug: cholecalciferol (vitamin D3)        STOP taking these medications    lisinopriL-hydrochlorothiazide 20-25 mg per tablet  Commonly known as: PRINZIDE,ZESTORETIC          •  amLODIPine, Take 5 mg by mouth nightly.  •  aspirin, Take 81 mg by mouth nightly.  •  atorvastatin, Take 40 mg by mouth nightly.  •  cholecalciferol (vitamin D3), Take 1,000 Units by mouth nightly.  •  COLLAGEN-BIOTIN-ASCORBIC ACID ORAL, Take by mouth daily.  •  latanoprost, Administer 1 drop into both eyes nightly.  •  lisinopriL-hydrochlorothiazide, Take 1 tablet by mouth nightly.  •  vit A/vit C/biotin/zinc/copper (HAIR-SKIN-NAIL,VIT A,C-BIOTIN, ORAL), Take 1 tablet by mouth daily.       PROCEDURES / LABS / IMAGING          Pertinent Labs  Lab Results   Component Value Date    WBC 4.53 05/23/2023    HGB 11.7 (L) 05/23/2023    HCT 34.7 (L) 05/23/2023    MCV 92.5 05/23/2023     05/23/2023     Lab Results   Component Value Date    GLUCOSE 100 (H) 05/23/2023    CALCIUM 9.0 05/23/2023     (L) 05/23/2023    K 3.9 05/23/2023    CO2 25 05/23/2023     05/23/2023     BUN 27 (H) 05/23/2023    CREATININE 0.6 05/23/2023       Pertinent Imaging  CT ANGIOGRAPHY HEAD/NECK WITH AND WITHOUT IV CONTRAST    Result Date: 5/21/2023  IMPRESSION: 1.  No high-grade stenosis or occlusion of the major intracranial arteries. 2.  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 3.  No high-grade stenosis of the cervical carotid or vertebral arteries. 4.  Left thyroid nodule measuring 2 cm. 5.  Patchy groundglass and interstitial opacities at the lung apices.    X-RAY THORACIC SPINE 2 VIEWS    Result Date: 5/21/2023  IMPRESSION: Age indeterminant T12 compression deformity, new from 2020. Multilevel spondylitic changes.     X-RAY LUMBAR SPINE 2 OR 3 VIEWS    Result Date: 5/21/2023  IMPRESSION: Age indeterminant T12 compression deformity, new from 2020. Multilevel spondylitic changes.     CT HEAD WITHOUT IV CONTRAST    Result Date: 5/21/2023  IMPRESSION: 1.  No acute intracranial hemorrhage. 2.  Cerebral volume loss and moderately severe subcortical and periventricular white matter changes that may be related to chronic microangiopathic disease. 3.  Probable 7 mm basilar tip aneurysm. Assessment with CTA can be considered for further evaluation. 4.  No acute fracture of the cervical spine. Reversal of the normal lordotic curvature with multilevel spondylitic changes as described.     CT CERVICAL SPINE WITHOUT IV CONTRAST    Result Date: 5/21/2023  IMPRESSION: 1.  No acute intracranial hemorrhage. 2.  Cerebral volume loss and moderately severe subcortical and periventricular white matter changes that may be related to chronic microangiopathic disease. 3.  Probable 7 mm basilar tip aneurysm. Assessment with CTA can be considered for further evaluation. 4.  No acute fracture of the cervical spine. Reversal of the normal lordotic curvature with multilevel spondylitic changes as described.       OUTPATIENT  FOLLOW-UP / REFERRALS / PENDING TESTS        Outpatient Follow-Up Appointments  Encounter Information     This patient does not currently have any appointments scheduled.         Referrals  No orders of the defined types were placed in this encounter.      Test Results Pending at Discharge  Unresulted Labs (From admission, onward)    None          Important Issues to Address in Follow-Up  -F/u with neurosurgery regarding basilar artery aneurysm  -f/u with cardiology   -f/u with orthopedics as needed for compression fracture    DISCHARGE DISPOSITION      Disposition: Home     Code Status At Discharge: Full Code    Autumn Singh, DO

## 2023-05-23 NOTE — PROGRESS NOTES
Referral received and chart reviewed. Spoke with patient, agreeable to PT services with [facility]. Referral completed.

## 2023-05-23 NOTE — PROGRESS NOTES
Cardiology Progress Note       Subjective     No new complaints. Denies chest pain, SOB, dizziness, palps.       Objective     Vital signs in last 24 hours  Temp:  [36.4 °C (97.5 °F)-36.8 °C (98.2 °F)] 36.4 °C (97.5 °F)  Heart Rate:  [61-80] 61  Resp:  [] 16  BP: (112-160)/(52-74) 127/70      Intake/Output Summary (Last 24 hours) at 5/23/2023 0806  Last data filed at 5/23/2023 0600  Gross per 24 hour   Intake 240 ml   Output 500 ml   Net -260 ml         Physical Exam     Constitutional:  Well-developed and well-nourished. NAD  Head: atraumatic, normocephalic   Neck: No JVD present. Carotid bruit is not present.   Cardiovascular: Regular rate and rhythm.  no murmur, rubs, gallops.  Pulmonary/Chest: Clear to auscultation bilaterally.  Abdomen: soft, NT/ND.  Extremities: No clubbing/cyanosis/edema.   Neurological: Alert and oriented to person, place, and time. Appropriate affect  Skin: warm, dry    Labs  Results from last 7 days   Lab Units 05/23/23  0425 05/22/23  0354 05/21/23  1357   SODIUM mEQ/L 131* 132* 132*   POTASSIUM mEQ/L 3.9 3.1* 3.7   MAGNESIUM mg/dL  --  2.0  --    CHLORIDE mEQ/L 100 100 98   CO2 mEQ/L 25 27 25   BUN mg/dL 27* 18 20   CREATININE mg/dL 0.6 0.6 0.6   AST IU/L  --   --  36   ALT IU/L  --   --  34     Results from last 7 days   Lab Units 05/23/23  0425 05/22/23  0354 05/21/23  1357   WBC K/uL 4.53 5.99 9.65   HEMOGLOBIN g/dL 11.7* 12.6 14.1   HEMATOCRIT % 34.7* 36.9 41.2   PLATELETS K/uL 171 187 212         Current Meds  • acetaminophen  650 mg oral q6h EVI   • amLODIPine  5 mg oral Nightly   • aspirin  81 mg oral q AM   • atorvastatin  40 mg oral Nightly   • cholecalciferol (vitamin D3)  1,000 Units oral Daily   • docusate sodium  100 mg oral BID   • heparin (porcine)  5,000 Units subcutaneous q8h [facility]   • lidocaine  1 patch Topical Daily   • lisinopriL  20 mg oral Daily             Assessment & Plan  1. Fall : mechanical. Patient denies LOC and was standing on a footstool and  changing ACE counts when this occurred.  She says she lost her balance.  She denies any palpitations.  She denies any fainting.  No evidence of cardiac cause.    2.  Hypokalemia: Resolved.  Possibly related to hydrochlorothiazide.  She is off that at this time.    3.  Hypertensive heart disease: Blood pressures are better controlled today.  She is on lisinopril 20 mg daily.  She is also on amlodipine    4.  Mixed hyperlipidemia: Continue statin therapy as before.    She may follow-up with me as an outpatient within the next month.      Robin Miguel MD  5/23/2023  Pager #[phone]

## 2023-05-23 NOTE — PLAN OF CARE
Care Coordination Discharge Plan Note     Discharge Needs Assessment  Concerns to be Addressed: care coordination/care conferences, discharge planning  Current Discharge Risk:      Anticipated Discharge Plan  Anticipated Discharge Disposition: home with assistance, home with home health           Patient Choice  Offered/Gave Vendor List:    Patient's Choice of Community Agency(s):  [facility]         Discharge Barriers   Barriers to Discharge:    ---------------------------------------------------------------------------------------------------------------------    Interdisciplinary Discharge Plan Review:  Participants:     Concerns Comments:      Discharge Plan:   Disposition/Destination:   /    Discharge Facility:    Community Resources:  [facility]    Discharge Transportation:  Is Out of Hospital DNR needed at Discharge:  no  Does patient need discharge transport?  no

## 2023-05-24 NOTE — UM PHYSICIAN REVIEW NOTE
Utilization Secondary Review Note      Patient Name: Nelly Meyer      MRN: [id number]  Insurance: [facility]  Admission date: 5/21/2023 - 5/23 [birth year]  [id number],   Member ID [id number]    81yo anemia, HTN, HL, osteoporosis    5/21 Fell backwards changing a lightbulb. Couldn't get up, lightheaded.  207/84.  CTA with incidental aneurysm - outpt f/u.  Xray T, L spine:age indeterminate comp fx T12. Seen by ortho.  Orthostatic - 121/53 lying, 97/53 standing    5/22 RRT - standing up and had acute back pain which made her feel weak and fall to ground.  Later, she didn't remember the event and it was described as presyncopal/syncopal.  NS 60/hr  No longer orthostatic .  Toradol IV q6 scheduled    5/23 toradol IV q6 scheduled.  D/c home    Emailed for P2P    Jerry Edwards MD  5/24/2023

## 2023-05-30 ENCOUNTER — TELEPHONE (OUTPATIENT)
Dept: NEUROSURGERY | Facility: CLINIC | Age: 83
End: 2023-05-30
Payer: COMMERCIAL

## 2023-05-30 NOTE — TELEPHONE ENCOUNTER
Spoke w pt regarding angiogram date, would like to do 6/15. Stated andreas will reach out to pt w instructions for procedure and answer any questions. Pt would like a call before end of week so she can arrange transportation, [phone].

## 2023-05-31 ENCOUNTER — PREP FOR CASE (OUTPATIENT)
Dept: NEUROSURGERY | Facility: CLINIC | Age: 83
End: 2023-05-31
Payer: COMMERCIAL

## 2023-05-31 ENCOUNTER — TELEPHONE (OUTPATIENT)
Dept: NEUROSURGERY | Facility: CLINIC | Age: 83
End: 2023-05-31
Payer: COMMERCIAL

## 2023-05-31 DIAGNOSIS — I67.1 CEREBRAL ANEURYSM: Primary | ICD-10-CM

## 2023-05-31 NOTE — TELEPHONE ENCOUNTER
Surgical instructions reviewed in detail. Patient will obtain PATs. She demonstrated understanding and will call for additional questions.    Prep for case placed for 6/15/23.     Farhana- can you please email her angiogram instructions? Thanks!

## 2023-06-01 PROBLEM — I67.1 CEREBRAL ANEURYSM: Status: ACTIVE | Noted: 2023-06-01

## 2023-06-09 LAB
ABO GROUP BLD: NORMAL
APTT PPP: 28 SEC (ref 24–33)
INR PPP: 0.9 (ref 0.9–1.2)
PROTHROMBIN TIME: 9.8 SEC (ref 9.1–12)
RH BLD: POSITIVE

## 2023-06-10 LAB
APPEARANCE UR: ABNORMAL
BACTERIA #/AREA URNS HPF: ABNORMAL /[HPF]
BILIRUB UR QL STRIP: NEGATIVE
CASTS URNS QL MICRO: ABNORMAL /LPF
COLOR UR: YELLOW
EPI CELLS #/AREA URNS HPF: ABNORMAL /HPF (ref 0–10)
GLUCOSE UR QL STRIP: NEGATIVE
HGB UR QL STRIP: NEGATIVE
KETONES UR QL STRIP: ABNORMAL
LEUKOCYTE ESTERASE UR QL STRIP: ABNORMAL
MICRO URNS: ABNORMAL
NITRITE UR QL STRIP: NEGATIVE
PH UR STRIP: 5.5 [PH] (ref 5–7.5)
PROT UR QL STRIP: ABNORMAL
RBC #/AREA URNS HPF: ABNORMAL /HPF (ref 0–2)
SP GR UR STRIP: >=1.03 (ref 1–1.03)
UROBILINOGEN UR STRIP-MCNC: 1 MG/DL (ref 0.2–1)
WBC #/AREA URNS HPF: ABNORMAL /HPF (ref 0–5)

## 2023-06-12 ENCOUNTER — HOSPITAL ENCOUNTER (OUTPATIENT)
Dept: RADIOLOGY | Facility: CLINIC | Age: 83
Discharge: HOME | End: 2023-06-12
Attending: PHYSICIAN ASSISTANT
Payer: COMMERCIAL

## 2023-06-12 DIAGNOSIS — I67.1 CEREBRAL ANEURYSM: ICD-10-CM

## 2023-06-12 PROCEDURE — 71046 X-RAY EXAM CHEST 2 VIEWS: CPT

## 2023-06-15 ENCOUNTER — ANESTHESIA (OUTPATIENT)
Dept: CARDIOLOGY | Facility: HOSPITAL | Age: 83
Setting detail: HOSPITAL OUTPATIENT SURGERY
End: 2023-06-15
Payer: COMMERCIAL

## 2023-06-15 ENCOUNTER — HOSPITAL ENCOUNTER (OUTPATIENT)
Facility: HOSPITAL | Age: 83
Setting detail: HOSPITAL OUTPATIENT SURGERY
Discharge: HOME | End: 2023-06-15
Attending: STUDENT IN AN ORGANIZED HEALTH CARE EDUCATION/TRAINING PROGRAM | Admitting: STUDENT IN AN ORGANIZED HEALTH CARE EDUCATION/TRAINING PROGRAM
Payer: COMMERCIAL

## 2023-06-15 VITALS
SYSTOLIC BLOOD PRESSURE: 151 MMHG | HEIGHT: 67 IN | WEIGHT: 166 LBS | DIASTOLIC BLOOD PRESSURE: 68 MMHG | RESPIRATION RATE: 13 BRPM | TEMPERATURE: 98.7 F | BODY MASS INDEX: 26.06 KG/M2 | OXYGEN SATURATION: 94 % | HEART RATE: 63 BPM

## 2023-06-15 DIAGNOSIS — I67.1 CEREBRAL ANEURYSM: ICD-10-CM

## 2023-06-15 LAB
ABO + RH BLD: NORMAL
BLD GP AB SCN SERPL QL: NEGATIVE
D AG BLD QL: POSITIVE
LABORATORY COMMENT REPORT: NORMAL
SPECIMEN EXP DATE BLD: NORMAL

## 2023-06-15 PROCEDURE — 36000460 HC NL ROOM CHARGE - 60 MINUTES: Performed by: STUDENT IN AN ORGANIZED HEALTH CARE EDUCATION/TRAINING PROGRAM

## 2023-06-15 PROCEDURE — 63600000 HC DRUGS/DETAIL CODE: Performed by: ANESTHESIOLOGY

## 2023-06-15 PROCEDURE — 25000000 HC PHARMACY GENERAL: Performed by: STUDENT IN AN ORGANIZED HEALTH CARE EDUCATION/TRAINING PROGRAM

## 2023-06-15 PROCEDURE — C1894 INTRO/SHEATH, NON-LASER: HCPCS | Performed by: STUDENT IN AN ORGANIZED HEALTH CARE EDUCATION/TRAINING PROGRAM

## 2023-06-15 PROCEDURE — 71000011 HC PACU PHASE 1 EA ADDL MIN: Performed by: STUDENT IN AN ORGANIZED HEALTH CARE EDUCATION/TRAINING PROGRAM

## 2023-06-15 PROCEDURE — B31DYZZ FLUOROSCOPY OF RIGHT VERTEBRAL ARTERY USING OTHER CONTRAST: ICD-10-PCS | Performed by: STUDENT IN AN ORGANIZED HEALTH CARE EDUCATION/TRAINING PROGRAM

## 2023-06-15 PROCEDURE — 86901 BLOOD TYPING SEROLOGIC RH(D): CPT

## 2023-06-15 PROCEDURE — 36415 COLL VENOUS BLD VENIPUNCTURE: CPT | Performed by: STUDENT IN AN ORGANIZED HEALTH CARE EDUCATION/TRAINING PROGRAM

## 2023-06-15 PROCEDURE — 76937 US GUIDE VASCULAR ACCESS: CPT | Mod: 26 | Performed by: STUDENT IN AN ORGANIZED HEALTH CARE EDUCATION/TRAINING PROGRAM

## 2023-06-15 PROCEDURE — 76937 US GUIDE VASCULAR ACCESS: CPT | Performed by: STUDENT IN AN ORGANIZED HEALTH CARE EDUCATION/TRAINING PROGRAM

## 2023-06-15 PROCEDURE — 36226 PLACE CATH VERTEBRAL ART: CPT | Mod: RT | Performed by: STUDENT IN AN ORGANIZED HEALTH CARE EDUCATION/TRAINING PROGRAM

## 2023-06-15 PROCEDURE — 71000001 HC PACU PHASE 1 INITIAL 30MIN: Performed by: STUDENT IN AN ORGANIZED HEALTH CARE EDUCATION/TRAINING PROGRAM

## 2023-06-15 PROCEDURE — 37000001 HC ANESTHESIA GENERAL: Performed by: STUDENT IN AN ORGANIZED HEALTH CARE EDUCATION/TRAINING PROGRAM

## 2023-06-15 PROCEDURE — 63600105 HC IODINE BASED CONTRAST: Performed by: STUDENT IN AN ORGANIZED HEALTH CARE EDUCATION/TRAINING PROGRAM

## 2023-06-15 PROCEDURE — C1769 GUIDE WIRE: HCPCS | Performed by: STUDENT IN AN ORGANIZED HEALTH CARE EDUCATION/TRAINING PROGRAM

## 2023-06-15 PROCEDURE — 25000000 HC PHARMACY GENERAL: Performed by: ANESTHESIOLOGY

## 2023-06-15 PROCEDURE — 76377 3D RENDER W/INTRP POSTPROCES: CPT | Mod: 26 | Performed by: STUDENT IN AN ORGANIZED HEALTH CARE EDUCATION/TRAINING PROGRAM

## 2023-06-15 PROCEDURE — 27200000 HC STERILE SUPPLY: Performed by: STUDENT IN AN ORGANIZED HEALTH CARE EDUCATION/TRAINING PROGRAM

## 2023-06-15 PROCEDURE — 63600000 HC DRUGS/DETAIL CODE: Performed by: STUDENT IN AN ORGANIZED HEALTH CARE EDUCATION/TRAINING PROGRAM

## 2023-06-15 RX ORDER — IBUPROFEN 200 MG
16-32 TABLET ORAL AS NEEDED
Status: DISCONTINUED | OUTPATIENT
Start: 2023-06-15 | End: 2023-06-15 | Stop reason: HOSPADM

## 2023-06-15 RX ORDER — LABETALOL HCL 20 MG/4 ML
SYRINGE (ML) INTRAVENOUS AS NEEDED
Status: DISCONTINUED | OUTPATIENT
Start: 2023-06-15 | End: 2023-06-15 | Stop reason: SURG

## 2023-06-15 RX ORDER — SODIUM CHLORIDE 9 MG/ML
80 INJECTION, SOLUTION INTRAVENOUS CONTINUOUS
Status: DISCONTINUED | OUTPATIENT
Start: 2023-06-15 | End: 2023-06-15 | Stop reason: HOSPADM

## 2023-06-15 RX ORDER — ONDANSETRON HYDROCHLORIDE 2 MG/ML
4 INJECTION, SOLUTION INTRAVENOUS EVERY 6 HOURS PRN
Status: DISCONTINUED | OUTPATIENT
Start: 2023-06-15 | End: 2023-06-15 | Stop reason: HOSPADM

## 2023-06-15 RX ORDER — HEPARIN SODIUM 1000 [USP'U]/ML
INJECTION, SOLUTION INTRAVENOUS; SUBCUTANEOUS
Status: DISCONTINUED | OUTPATIENT
Start: 2023-06-15 | End: 2023-06-15 | Stop reason: HOSPADM

## 2023-06-15 RX ORDER — LIDOCAINE HYDROCHLORIDE 10 MG/ML
INJECTION, SOLUTION INFILTRATION; PERINEURAL
Status: DISCONTINUED | OUTPATIENT
Start: 2023-06-15 | End: 2023-06-15 | Stop reason: HOSPADM

## 2023-06-15 RX ORDER — IODIXANOL 320 MG/ML
INJECTION, SOLUTION INTRAVASCULAR
Status: DISCONTINUED | OUTPATIENT
Start: 2023-06-15 | End: 2023-06-15 | Stop reason: HOSPADM

## 2023-06-15 RX ORDER — VERAPAMIL HYDROCHLORIDE 2.5 MG/ML
INJECTION, SOLUTION INTRAVENOUS
Status: DISCONTINUED | OUTPATIENT
Start: 2023-06-15 | End: 2023-06-15 | Stop reason: HOSPADM

## 2023-06-15 RX ORDER — HYDRALAZINE HYDROCHLORIDE 20 MG/ML
10 INJECTION INTRAMUSCULAR; INTRAVENOUS
Status: DISCONTINUED | OUTPATIENT
Start: 2023-06-15 | End: 2023-06-15 | Stop reason: HOSPADM

## 2023-06-15 RX ORDER — FENTANYL CITRATE 50 UG/ML
INJECTION, SOLUTION INTRAMUSCULAR; INTRAVENOUS AS NEEDED
Status: DISCONTINUED | OUTPATIENT
Start: 2023-06-15 | End: 2023-06-15 | Stop reason: SURG

## 2023-06-15 RX ORDER — DEXTROSE 50 % IN WATER (D50W) INTRAVENOUS SYRINGE
25 AS NEEDED
Status: DISCONTINUED | OUTPATIENT
Start: 2023-06-15 | End: 2023-06-15 | Stop reason: HOSPADM

## 2023-06-15 RX ORDER — MIDAZOLAM HYDROCHLORIDE 2 MG/2ML
INJECTION, SOLUTION INTRAMUSCULAR; INTRAVENOUS AS NEEDED
Status: DISCONTINUED | OUTPATIENT
Start: 2023-06-15 | End: 2023-06-15 | Stop reason: SURG

## 2023-06-15 RX ORDER — DEXTROSE 40 %
15-30 GEL (GRAM) ORAL AS NEEDED
Status: DISCONTINUED | OUTPATIENT
Start: 2023-06-15 | End: 2023-06-15 | Stop reason: HOSPADM

## 2023-06-15 RX ORDER — ACETAMINOPHEN 325 MG/1
650 TABLET ORAL EVERY 4 HOURS PRN
Status: DISCONTINUED | OUTPATIENT
Start: 2023-06-15 | End: 2023-06-15 | Stop reason: HOSPADM

## 2023-06-15 RX ORDER — LABETALOL HYDROCHLORIDE 5 MG/ML
10 INJECTION, SOLUTION INTRAVENOUS
Status: DISCONTINUED | OUTPATIENT
Start: 2023-06-15 | End: 2023-06-15 | Stop reason: HOSPADM

## 2023-06-15 RX ADMIN — LABETALOL 20 MG/4 ML (5 MG/ML) INTRAVENOUS SYRINGE 5 MG: at 08:11

## 2023-06-15 RX ADMIN — MIDAZOLAM HYDROCHLORIDE 1 MG: 1 INJECTION, SOLUTION INTRAMUSCULAR; INTRAVENOUS at 08:07

## 2023-06-15 RX ADMIN — MIDAZOLAM HYDROCHLORIDE 1 MG: 1 INJECTION, SOLUTION INTRAMUSCULAR; INTRAVENOUS at 08:39

## 2023-06-15 RX ADMIN — FENTANYL CITRATE 50 MCG: 50 INJECTION, SOLUTION INTRAMUSCULAR; INTRAVENOUS at 08:07

## 2023-06-15 RX ADMIN — FENTANYL CITRATE 50 MCG: 50 INJECTION, SOLUTION INTRAMUSCULAR; INTRAVENOUS at 08:12

## 2023-06-15 NOTE — Clinical Note
Patient placed on procedure table in supine position with arms at side with right arm extended. Positioning devices: all pressure points padded, arm board under arms, safety strap applied, wrist straps in place and donut foam under head.

## 2023-06-15 NOTE — NURSING NOTE
Patient out of procedure in recovery, patient VSS, Dr Rain updating patient, patients lamar Rockwell called and updated. Right snuff box c/d/i.

## 2023-06-15 NOTE — Clinical Note
Closure device placed for the right radial artery. Closure device used: radial compression system. Closure pressure manually applied. Hemostasis achieved. R petr

## 2023-06-15 NOTE — DISCHARGE INSTRUCTIONS
Neurosurgery Angiogram Discharge Instructions    Groin/Wrist Site Care:     A bruise or small lump under the skin where the catheters were is normal. These usually go away within one week.     Please check your wound site daily to make sure there is no redness, bleeding, or swelling.     The dressing should be removed the day following the procedure. A Band-Aid can be used if needed.    Showering:     You may shower 24 hours after your procedure. Clean the area with mild soap and water. Do not rub the area. Pat the area dry with a clean towel.     Do not take a bath or submerge the area under water for 3 days.     If you have bleeding where your catheter was:    Lie down and hold direct pressure for 10 minutes. If bleeding does not stop in 10 minutes, or if there is a large amount of bleeding, call 911. If bleeding does stop, rest for at least 4 hours. Call your doctor.    Activity:     Do not bend over, strain, push, pull, run or lift anything over 5-10 pounds for 7 days.     You may do light exercise in 7 days.     You may walk when you return home but try to limit the use of stairs for 14 hours if possible.     Driving:     Do not drive a car or use any machinery for 24 hours. Someone else must drive you home today.    Diet:     You may eat your normal diet as per your doctor.    Call your doctor if you have:     Increased redness, heat, pus, bruising, or bleeding at the site after 24 hours.     Swelling at the catheter site.     Increased pain at the catheter site.     Numbness, pain or coolness in the leg.     Temperature of 101.0 degrees Fahrenheit or greater.     Chest pain or shortness of breath.      Plan:    You may resume your Lisinopril tomorrow, 6/16/23.    Call Dr. Rain' office at [phone] to make a follow up appointment in 2-3 weeks  Call Dr. Rain' office at [phone] with any questions or concerns

## 2023-06-15 NOTE — BRIEF OP NOTE
Diagnostic cerebral angiogram, possible embolization Procedure Note    Procedure:    Diagnostic cerebral angiogram, possible embolization  CPT(R) Code:  19647 - TX SLCTV CATH CAROTID/INNOM ART ANGIO XTRCRANL ART      Pre-op Diagnosis     * Cerebral aneurysm [I67.1]       Post-op Diagnosis     * Cerebral aneurysm [I67.1]    Surgeon(s) and Role:     * Ramiro Rain MD - Primary    Anesthesia: Moderate Sedation    Staff:   Circulator: Kayli Javier; Dyana Alejandre RN  Scrub Person: Liliana Ortiz, SHILOH  Documenter: Curt Sharif RN    Procedure Details   R SNUFF   5 Fr sheath    5fr vert catheter     R vert runs   3d spin    4.5 x 5.0 x 4.7 mm basilar tip aneurysm, 4.8 mm neck    Estimated Blood Loss: 15 mL    Specimens:                Order Name Source Comment Collection Info Order Time   TYPE AND SCREEN Blood, Venous  Collected By: Trish Case RN 6/15/2023  7:00 AM     Are you aware of this patient having previously identified antibodies?   NO          Does this Patient have Sickle Cell Disease/Sickle Cell Anemia?   NO          Release to patient   Immediate              Drains: * No LDAs found *    Implants: * No implants in log *           Complications:  None; patient tolerated the procedure well.           Disposition: PACU - hemodynamically stable.           Condition: stable    Ramiro Rain MD  Phone Number: [phone]

## 2023-06-15 NOTE — ANESTHESIA PREPROCEDURE EVALUATION
Relevant Problems   CARDIOVASCULAR   (+) Hypertension      GASTROINTESTINAL   (+) Hiatal hernia      URINARY SYSTEM   (+) Hyponatremia       Anesthesia ROS/MED HX    Anesthesia History - neg  Pulmonary - neg  Neuro/Psych - neg  Cardiovascular   hypertension   ECG reviewed  Comments: Recent hospitalization and multiple medical evaluations noted.  Hematological    anemia  GI/Hepatic   Hiatal hernia   GERD    Control: well controlled  Musculoskeletal- neg  Renal Disease- neg  Endo/Other- neg  Body Habitus: Normal       Past Surgical History:   Procedure Laterality Date   • BREAST SURGERY      right breast segmental resection   • COLONOSCOPY     • ESOPHAGOGASTRODUODENOSCOPY     • MOHS SURGERY      nose   • ORIF ANKLE FRACTURE BIMALLEOLAR Left    • TEAR DUCT SURGERY Right     tear duct bypass       Physical Exam    Airway   Mallampati: III   TM distance: >3 FB   Neck ROM: full  Cardiovascular - normal   Rhythm: regular   Rate: normalOther Findings   Recent hospitalization and multiple medical evaluations noted.  Dental    Teeth Problems: implants        Anesthesia Plan    Plan: MAC and general   3 ASA  Comments:    Plan: Will discuss MAC vs GA with surgeon, patient agrees.

## 2023-06-15 NOTE — ANESTHESIA POSTPROCEDURE EVALUATION
Patient: Nelly Meyer    Procedure Summary     Date: 06/15/23 Room / Location: [facility] NEURO LAB 4 / PH CARDIAC CATH/EP/NEURO    Anesthesia Start: 0801 Anesthesia Stop:     Procedure: Diagnostic cerebral angiogram, possible embolization Diagnosis:       Cerebral aneurysm      (Cerebral aneurysm [I67.1])    Providers: Ramiro Rain MD Responsible Provider: Abhijeet Diaz MD    Anesthesia Type: MAC, general ASA Status: 3          Anesthesia Type: MAC, general  PACU Vitals  6/15/2023 0855 - 6/15/2023 0955      6/15/2023  0900 6/15/2023  0915 6/15/2023  0930 6/15/2023  0945    BP: 169/75 161/72 159/70 148/60    Pulse: 68 71 63 64    Resp: -- 25 14 17    SpO2: 98 % 95 % 93 % 93 %            Anesthesia Post Evaluation    Pain management: adequate  Patient participation: complete - patient participated  Level of consciousness: awake and alert  Cardiovascular status: acceptable  Airway Patency: adequate  Respiratory status: acceptable  Hydration status: acceptable  Anesthetic complications: no

## 2023-06-15 NOTE — ANESTHESIOLOGIST PRE-PROCEDURE ATTESTATION
Pre-Procedure Patient Identification:  I am the Primary Anesthesiologist and have identified the patient on 06/15/23 at 7:09 AM.   I have confirmed the procedure(s) will be performed by the following surgeon/proceduralist Ramiro Rain MD.

## 2023-06-15 NOTE — NURSING NOTE
Patient ambulatory without difficulty, right wrist dressing c/d/i. No hematoma no swelling, Discharge instructions reviewed and went over with niece who will be staying with patient tonight. Questions answered. Patient left in wheelchair with RN to [facility].

## 2023-07-05 ENCOUNTER — OFFICE VISIT (OUTPATIENT)
Dept: NEUROSURGERY | Facility: CLINIC | Age: 83
End: 2023-07-05
Payer: COMMERCIAL

## 2023-07-05 VITALS
SYSTOLIC BLOOD PRESSURE: 159 MMHG | RESPIRATION RATE: 16 BRPM | HEIGHT: 67 IN | HEART RATE: 76 BPM | TEMPERATURE: 97.7 F | WEIGHT: 166 LBS | BODY MASS INDEX: 26.06 KG/M2 | OXYGEN SATURATION: 99 % | DIASTOLIC BLOOD PRESSURE: 79 MMHG

## 2023-07-05 DIAGNOSIS — I72.5 BASILAR ARTERY ANEURYSM (CMS/HCC): Primary | ICD-10-CM

## 2023-07-05 PROCEDURE — 3008F BODY MASS INDEX DOCD: CPT | Performed by: STUDENT IN AN ORGANIZED HEALTH CARE EDUCATION/TRAINING PROGRAM

## 2023-07-05 PROCEDURE — 3078F DIAST BP <80 MM HG: CPT | Performed by: STUDENT IN AN ORGANIZED HEALTH CARE EDUCATION/TRAINING PROGRAM

## 2023-07-05 PROCEDURE — 99205 OFFICE O/P NEW HI 60 MIN: CPT | Performed by: STUDENT IN AN ORGANIZED HEALTH CARE EDUCATION/TRAINING PROGRAM

## 2023-07-05 PROCEDURE — 3077F SYST BP >= 140 MM HG: CPT | Performed by: STUDENT IN AN ORGANIZED HEALTH CARE EDUCATION/TRAINING PROGRAM

## 2023-07-05 RX ORDER — CELECOXIB 200 MG/1
200 CAPSULE ORAL 2 TIMES DAILY
Status: ON HOLD | COMMUNITY
End: 2023-07-26

## 2023-07-05 NOTE — LETTER
July 5, 2023     Shahab Palacio MD  [address]  [place] [address]    Patient: Nelly Meyer  YOB: 1940  Date of Visit: 7/5/2023      Dear Dr. Palacio:    Thank you for referring Nelly Meyer to me for evaluation. Below are my notes for this consultation.    If you have questions, please do not hesitate to call me. I look forward to following your patient along with you.         Sincerely,        Ramiro Rain MD        CC: MD Ev Hathaway MD Robert A Ruggiero Jr., MD Rain, Ramiro BELLO MD  7/5/2023  3:33 PM  Signed  CC:  Cerebral Aneurysm     HPI:  Nelly Meyer is a 82 y.o.  female, with significant past medical history of HTN, HLD, and osteoporosis, who presented to [facility] ED for evaluation s/p fall. Patient states she was standing on a stool in her bathroom changing a sconce light bulb when she lost her footing and fell backwards onto the tile, striking the back of her head. She denies loss of consciousness and recalls the entire event. Head CT was completed upon arrival to the ED, followed by CTA, showing a 7mm basilar tip aneurysm, prompting neurointervention consult.     Patient states she does have midback discomfort from T12 fracture. She denies headaches, visual changes, nausea, vomiting, weakness paresthesias. She was unaware of having an aneurysm. She denies family history of aneurysms. She takes Aspirin 81mg daily but no other anticoagulation or antiplatelet medication.    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 She had an uneventful hospital stay and was discharged home.    She returns today to the clinic for further evaluation and recommendations. Since discharge home she has been feeling well. She denies WHOL, dizziness, weakness or difficulty with speech. She was a former smoker. She denies family history of cerebral aneurysms.       PMH:   has a past medical history of  Anemia, Basal cell carcinoma, Constipation, Gallstones, GERD (gastroesophageal reflux disease), Hiatal hernia, Hypertension, Kidney stone, Lipid disorder, Osteoporosis, and Postmenopausal.      PSH:  has a past surgical history that includes ORIF ankle fracture bimalleolar (Left); Breast surgery; Mohs surgery; Tear duct surgery (Right); Colonoscopy; and Esophagogastroduodenoscopy.      FH:  family history is not on file.      SH:    Social History     Tobacco Use   • Smoking status: Former   • Smokeless tobacco: Never   Vaping Use   • Vaping Use: Never used   Substance Use Topics   • Alcohol use: Yes     Alcohol/week: 7.0 standard drinks of alcohol     Types: 7 Glasses of wine per week     Comment: occasional   • Drug use: No     ALL:  No Known Allergies      Medications:    Current Outpatient Medications   Medication Sig Dispense Refill   • acetaminophen (TYLENOL) 500 mg tablet Take 1 tablet (500 mg total) by mouth every 6 (six) hours as needed for pain.     • amLODIPine (NORVASC) 5 mg tablet Take 10 mg by mouth nightly.     • aspirin 81 mg enteric coated tablet Take 81 mg by mouth nightly.     • atorvastatin (LIPITOR) 40 mg tablet Take 40 mg by mouth nightly.     • cholecalciferol, vitamin D3, (VITAMIN D3) 25 mcg (1,000 unit) capsule Take 1,000 Units by mouth nightly.     • COLLAGEN-BIOTIN-ASCORBIC ACID ORAL Take by mouth daily.     • latanoprost 0.005 % drops, emulsion Administer 1 drop into both eyes nightly.     • lidocaine (ASPERCREME) 4 % adhesive patch,medicated topical patch Apply 1 patch topically daily as needed (back pain).     • lisinopriL (PRINIVIL) 20 mg tablet Take 1 tablet (20 mg total) by mouth nightly. 30 tablet 1   • vit A/vit C/biotin/zinc/copper (HAIR-SKIN-NAIL,VIT A,C-BIOTIN, ORAL) Take 1 tablet by mouth daily.       No current facility-administered medications for this visit.     Review of Systems   All other systems reviewed and are negative.      EXAM:    There were no vitals filed for this  visit.    Well appearing female in NAD.    Neurologic Exam     Mental Status   Oriented to person, place, and time.   Attention: normal.   Speech: speech is normal   Level of consciousness: alert  Knowledge: good.     Cranial Nerves   Cranial nerves II through XII intact.     Motor Exam   Muscle bulk: normal  Right arm pronator drift: absent  Left arm pronator drift: absent    Strength   Strength 5/5 throughout.     Sensory Exam   Light touch normal.     Gait, Coordination, and Reflexes     Gait  Gait: normal    2+DP pulses bilaterally, no ankle edema  Eyes: PERRL, no conjunctival pallor       DATA REVIEW:  CTA HEAD/NECK W/WO CONTRAST 05/21/2023  Findings:  CTA NECK:  Aortic Arch: Normal Configuration  Origin of Great Vessels: No significant stenosis  Right Carotid Artery:  Level of bifurcation: C4  Internal Carotid Artery Stenosis: No significant stenosis by NASCET criteria.  Plaque characterization: Mild calcified atherosclerotic plaque.  Left Carotid Artery:  Level of bifurcation: C4  Internal Carotid Artery Stenosis: No significant stenosis by NASCET criteria.  Plaque characterization: Mild calcified atherosclerotic plaque.  Vertebral Arteries:  Characteristics: The vertebral arteries are codominant.  No significant  atherosclerotic plaque or stenosis.  CTA HEAD:  Anterior Circulation:  Right ICA:  Petrous & Cavernous segments: Mild calcified atherosclerotic plaque of the  cavernous segment. No evidence of significant stenosis or aneurysm.  Ophthalmic: Mild calcified atherosclerotic plaque. No evidence of significant  stenosis or aneurysm.  Terminal ICA: No evidence of significant stenosis or aneurysm.  A1 segment: No evidence of significant stenosis.  M1 segment: No evidence of significant stenosis.  Middle cerebral artery trunk: No evidence of significant stenosis or aneurysm.  Anterior communicating artery: No evidence of aneurysm.  Posterior communicating artery: Aplastic.  Left ICA:  Petrous & Cavernous  segments: Mild calcified atherosclerotic plaque of the  cavernous segment. No evidence of significant stenosis or aneurysm.  Ophthalmic: Mild calcified atherosclerotic plaque. No evidence of significant  stenosis or aneurysm.  Terminal ICA: No evidence of significant stenosis or aneurysm.  A1 segment: Hypoplastic.  No evidence of significant stenosis.  M1 segment: No evidence of significant stenosis.  Middle cerebral artery trunk: No evidence of significant stenosis or aneurysm.  Anterior communicating artery: No evidence of aneurysm.  Posterior communicating artery: No evidence of aneurysm.  Posterior circulation:  Right distal vertebral artery: No evidence of significant stenosis.  Left distal vertebral artery: No evidence of significant stenosis.  Basilar trunk: No evidence of significant stenosis.  Basilar terminus: Aneurysm measuring 5 x 6 x 7 mm.  Right posterior cerebral artery (PCA): No evidence of significant stenosis.  Left posterior cerebral artery (PCA): Fetal origin. No evidence of significant  stenosis  Incidental Findings: There is a 2 cm nodule in the left thyroid lobe.  Patchy  groundglass and interstitial opacities at the lung apices.  Degenerative changes of the imaged spine.  IMPRESSION:  1.  No high-grade stenosis or occlusion of the major intracranial arteries.  2.  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  3.  No high-grade stenosis of the cervical carotid or vertebral arteries.  4.  Left thyroid nodule measuring 2 cm.  5.  Patchy groundglass and interstitial opacities at the lung apices.            I personally reviewed and interpreted the images as well.   I personally reviewed the patient's Epic record and referring physician's notes.     A/P:  In summary, Nelly Meyer is a 82 y.o. female with an incidentally discovered 6 mm basilar tip aneurysm which was revealed on head CT and subsequent CTA after she sustained a mechanical fall in May 2023.   She is s/p diagnostic cerebral  angiogram on 6/15/2023, which confirmed a basilar tip aneurysm measuring nearly 6 mm in its greatest dimension with a celestin's excresence along the dome.     I explained to the patient given the size, shape and morphology of the aneurysm, I am concerned about the cumulative risk of rupture. I do feel that benefits of treatment, while not insignificant, outweigh the risks. I noted that the posterior circulation, particularly the basilar apex is a high risk location and we do worry about aneurysm rupture, which in general is higher in this location can also lead to significant morbidity and in many cases mortality.     The patient is otherwise healthy and leads an active lifestyle.     I explained the treatment options.      Open craniotomy and clip ligation of an aneurysm in this location is high risk and I would not recommend it.      Endovascular treatment options include possible intrasaccular occlusion with the WEB device, coiling stent vs. Balloon-assisted. Risks of the procedure include but are not limited to intraoperative aneurysm rupture and resulting subarachnoid hemorrhage, catastrophic stroke, numbness, weakness, paralysis, coma and death; the patient will be initiated on antiplatelet therapy prior to the procedure once we have a date selected. Also, I will need her to obtain cardiac and medical clearances.      I discussed alternatives to treatment which include serial MRAs or CTAs and yearly angiograms for monitoring, but again if there was any change in the aneurysm or symptoms such as a sentinel headache, this would necessitate treatment.     The patient is concerned about aneurysm rupture and would like to proceed with scheduling treatment. I will also call her primary care provider and her sister, who she stated is her family contact and POA. I stated a family member/POA will need to be present and involved around the time of her procedure.     All questions answered in detail. Opportunity offered  for second opinion. She was thankful for the care she has received thus far.     I told the patient that if she develops any headache or worst headache of life, nausea, vomiting, numbness, weakness, dizziness, slurred speech, blurry or double vision or any other neurologic symptoms to call 911 and/or seek immediate medical attention in the emergency department as this could be a sign of aneurysm rupture. She understands.     Ramiro Rain MD

## 2023-07-05 NOTE — PROGRESS NOTES
CC:  Cerebral Aneurysm     HPI:  Nelly Meyer is a 82 y.o.  female, with significant past medical history of HTN, HLD, and osteoporosis, who presented to [facility] for evaluation s/p fall. Patient states she was standing on a stool in her bathroom changing a sconce light bulb when she lost her footing and fell backwards onto the tile, striking the back of her head. She denies loss of consciousness and recalls the entire event. Head CT was completed upon arrival to the ED, followed by CTA, showing a 7mm basilar tip aneurysm, prompting neurointervention consult.     Patient states she does have midback discomfort from T12 fracture. She denies headaches, visual changes, nausea, vomiting, weakness paresthesias. She was unaware of having an aneurysm. She denies family history of aneurysms. She takes Aspirin 81mg daily but no other anticoagulation or antiplatelet medication.    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 She had an uneventful hospital stay and was discharged home.    She returns today to the clinic for further evaluation and recommendations. Since discharge home she has been feeling well. She denies WHOL, dizziness, weakness or difficulty with speech. She was a former smoker. She denies family history of cerebral aneurysms.       PMH:   has a past medical history of Anemia, Basal cell carcinoma, Constipation, Gallstones, GERD (gastroesophageal reflux disease), Hiatal hernia, Hypertension, Kidney stone, Lipid disorder, Osteoporosis, and Postmenopausal.      PSH:  has a past surgical history that includes ORIF ankle fracture bimalleolar (Left); Breast surgery; Mohs surgery; Tear duct surgery (Right); Colonoscopy; and Esophagogastroduodenoscopy.      FH:  family history is not on file.      SH:    Social History     Tobacco Use   • Smoking status: Former   • Smokeless tobacco: Never   Vaping Use   • Vaping Use: Never used   Substance Use Topics   • Alcohol  use: Yes     Alcohol/week: 7.0 standard drinks of alcohol     Types: 7 Glasses of wine per week     Comment: occasional   • Drug use: No     ALL:  No Known Allergies      Medications:    Current Outpatient Medications   Medication Sig Dispense Refill   • acetaminophen (TYLENOL) 500 mg tablet Take 1 tablet (500 mg total) by mouth every 6 (six) hours as needed for pain.     • amLODIPine (NORVASC) 5 mg tablet Take 10 mg by mouth nightly.     • aspirin 81 mg enteric coated tablet Take 81 mg by mouth nightly.     • atorvastatin (LIPITOR) 40 mg tablet Take 40 mg by mouth nightly.     • cholecalciferol, vitamin D3, (VITAMIN D3) 25 mcg (1,000 unit) capsule Take 1,000 Units by mouth nightly.     • COLLAGEN-BIOTIN-ASCORBIC ACID ORAL Take by mouth daily.     • latanoprost 0.005 % drops, emulsion Administer 1 drop into both eyes nightly.     • lidocaine (ASPERCREME) 4 % adhesive patch,medicated topical patch Apply 1 patch topically daily as needed (back pain).     • lisinopriL (PRINIVIL) 20 mg tablet Take 1 tablet (20 mg total) by mouth nightly. 30 tablet 1   • vit A/vit C/biotin/zinc/copper (HAIR-SKIN-NAIL,VIT A,C-BIOTIN, ORAL) Take 1 tablet by mouth daily.       No current facility-administered medications for this visit.     Review of Systems   All other systems reviewed and are negative.      EXAM:    There were no vitals filed for this visit.    Well appearing female in NAD.    Neurologic Exam     Mental Status   Oriented to person, place, and time.   Attention: normal.   Speech: speech is normal   Level of consciousness: alert  Knowledge: good.     Cranial Nerves   Cranial nerves II through XII intact.     Motor Exam   Muscle bulk: normal  Right arm pronator drift: absent  Left arm pronator drift: absent    Strength   Strength 5/5 throughout.     Sensory Exam   Light touch normal.     Gait, Coordination, and Reflexes     Gait  Gait: normal    2+DP pulses bilaterally, no ankle edema  Eyes: PERRL, no conjunctival pallor        DATA REVIEW:  CTA HEAD/NECK W/WO CONTRAST 05/21/2023  Findings:  CTA NECK:  Aortic Arch: Normal Configuration  Origin of Great Vessels: No significant stenosis  Right Carotid Artery:  Level of bifurcation: C4  Internal Carotid Artery Stenosis: No significant stenosis by NASCET criteria.  Plaque characterization: Mild calcified atherosclerotic plaque.  Left Carotid Artery:  Level of bifurcation: C4  Internal Carotid Artery Stenosis: No significant stenosis by NASCET criteria.  Plaque characterization: Mild calcified atherosclerotic plaque.  Vertebral Arteries:  Characteristics: The vertebral arteries are codominant.  No significant  atherosclerotic plaque or stenosis.  CTA HEAD:  Anterior Circulation:  Right ICA:  Petrous & Cavernous segments: Mild calcified atherosclerotic plaque of the  cavernous segment. No evidence of significant stenosis or aneurysm.  Ophthalmic: Mild calcified atherosclerotic plaque. No evidence of significant  stenosis or aneurysm.  Terminal ICA: No evidence of significant stenosis or aneurysm.  A1 segment: No evidence of significant stenosis.  M1 segment: No evidence of significant stenosis.  Middle cerebral artery trunk: No evidence of significant stenosis or aneurysm.  Anterior communicating artery: No evidence of aneurysm.  Posterior communicating artery: Aplastic.  Left ICA:  Petrous & Cavernous segments: Mild calcified atherosclerotic plaque of the  cavernous segment. No evidence of significant stenosis or aneurysm.  Ophthalmic: Mild calcified atherosclerotic plaque. No evidence of significant  stenosis or aneurysm.  Terminal ICA: No evidence of significant stenosis or aneurysm.  A1 segment: Hypoplastic.  No evidence of significant stenosis.  M1 segment: No evidence of significant stenosis.  Middle cerebral artery trunk: No evidence of significant stenosis or aneurysm.  Anterior communicating artery: No evidence of aneurysm.  Posterior communicating artery: No evidence of  aneurysm.  Posterior circulation:  Right distal vertebral artery: No evidence of significant stenosis.  Left distal vertebral artery: No evidence of significant stenosis.  Basilar trunk: No evidence of significant stenosis.  Basilar terminus: Aneurysm measuring 5 x 6 x 7 mm.  Right posterior cerebral artery (PCA): No evidence of significant stenosis.  Left posterior cerebral artery (PCA): Fetal origin. No evidence of significant  stenosis  Incidental Findings: There is a 2 cm nodule in the left thyroid lobe.  Patchy  groundglass and interstitial opacities at the lung apices.  Degenerative changes of the imaged spine.  IMPRESSION:  1.  No high-grade stenosis or occlusion of the major intracranial arteries.  2.  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  3.  No high-grade stenosis of the cervical carotid or vertebral arteries.  4.  Left thyroid nodule measuring 2 cm.  5.  Patchy groundglass and interstitial opacities at the lung apices.            I personally reviewed and interpreted the images as well.   I personally reviewed the patient's Epic record and referring physician's notes.     A/P:  In summary, Nelly Meyer is a 82 y.o. female with an incidentally discovered 6 mm basilar tip aneurysm which was revealed on head CT and subsequent CTA after she sustained a mechanical fall in May 2023.   She is s/p diagnostic cerebral angiogram on 6/15/2023, which confirmed a basilar tip aneurysm measuring nearly 6 mm in its greatest dimension with a celestin's excresence along the dome.     I explained to the patient given the size, shape and morphology of the aneurysm, I am concerned about the cumulative risk of rupture. I do feel that benefits of treatment, while not insignificant, outweigh the risks. I noted that the posterior circulation, particularly the basilar apex is a high risk location and we do worry about aneurysm rupture, which in general is higher in this location can also lead to significant morbidity  and in many cases mortality.     The patient is otherwise healthy and leads an active lifestyle.     I explained the treatment options.      Open craniotomy and clip ligation of an aneurysm in this location is high risk and I would not recommend it.      Endovascular treatment options include possible intrasaccular occlusion with the WEB device, coiling stent vs. Balloon-assisted. Risks of the procedure include but are not limited to intraoperative aneurysm rupture and resulting subarachnoid hemorrhage, catastrophic stroke, numbness, weakness, paralysis, coma and death; the patient will be initiated on antiplatelet therapy prior to the procedure once we have a date selected. Also, I will need her to obtain cardiac and medical clearances.      I discussed alternatives to treatment which include serial MRAs or CTAs and yearly angiograms for monitoring, but again if there was any change in the aneurysm or symptoms such as a sentinel headache, this would necessitate treatment.     The patient is concerned about aneurysm rupture and would like to proceed with scheduling treatment. I will also call her primary care provider and her sister, who she stated is her family contact and POA. I stated a family member/POA will need to be present and involved around the time of her procedure.     All questions answered in detail. Opportunity offered for second opinion. She was thankful for the care she has received thus far.     I told the patient that if she develops any headache or worst headache of life, nausea, vomiting, numbness, weakness, dizziness, slurred speech, blurry or double vision or any other neurologic symptoms to call 911 and/or seek immediate medical attention in the emergency department as this could be a sign of aneurysm rupture. She understands.     Ramiro Rain MD

## 2023-07-10 PROBLEM — I67.1 CEREBRAL ANEURYSM, NONRUPTURED: Status: ACTIVE | Noted: 2023-07-07

## 2023-07-13 ENCOUNTER — TELEPHONE (OUTPATIENT)
Dept: NEUROSURGERY | Facility: CLINIC | Age: 83
End: 2023-07-13
Payer: COMMERCIAL

## 2023-07-13 RX ORDER — CLOPIDOGREL BISULFATE 75 MG/1
75 TABLET ORAL DAILY
Qty: 30 TABLET | Refills: 1 | Status: ON HOLD | OUTPATIENT
Start: 2023-07-13 | End: 2023-07-26

## 2023-07-13 NOTE — TELEPHONE ENCOUNTER
Called and spoke to patient. Sending plavix medication to pharmacy on file and patient is aware to begin taking daily starting tomorrow 7/14/2023.     All questions answered.

## 2023-07-13 NOTE — TELEPHONE ENCOUNTER
Pt would like a call to discuss start date of Plavix and if it is okay to take medication at night, please call [phone].

## 2023-07-14 ENCOUNTER — TELEPHONE (OUTPATIENT)
Dept: NEUROSURGERY | Facility: CLINIC | Age: 83
End: 2023-07-14
Payer: COMMERCIAL

## 2023-07-14 NOTE — TELEPHONE ENCOUNTER
NSGY Attending Note    I spoke to the patient's sister, Olimpia on the phone. Explained indications, risks, benefits and alternatives to the brain aneurysm treatment planned for 7/26/2023. Everything explained in layman's terms. Ample time offered for questions and answers.     Risks of the procedure include: pain, bleeding, infection, numbness, weakness, vision loss, speech dysfunction, swallowing dysfunction, major stroke, intracranial hemorrhage, paralysis, coma, death; access site complications from angiography which can include but are not limited to bleeding, pseudoaneurysm, retroperitoneal hematoma or compartment syndrome and limb loss; renal failure from contrast administration from the procedure.     The patient's sister is in agreement with proceeding.    I also called the patient's PCP office to ask for medical clearance. Patient is seeing cardiology today for cardiac clearance.     Ramiro Rain MD

## 2023-07-21 ENCOUNTER — APPOINTMENT (OUTPATIENT)
Dept: LAB | Facility: HOSPITAL | Age: 83
DRG: 027 | End: 2023-07-21
Attending: NURSE PRACTITIONER
Payer: COMMERCIAL

## 2023-07-21 DIAGNOSIS — I67.1 CEREBRAL ANEURYSM, NONRUPTURED: ICD-10-CM

## 2023-07-21 LAB
ABO + RH BLD: NORMAL
ANION GAP SERPL CALC-SCNC: 7 MEQ/L (ref 3–15)
APTT PPP: 31 SEC (ref 23–35)
BACTERIA URNS QL MICRO: ABNORMAL /HPF
BILIRUB UR QL STRIP.AUTO: NEGATIVE MG/DL
BLD GP AB SCN SERPL QL: NEGATIVE
BLOOD BANK CMNT PATIENT-IMP: NORMAL
BUN SERPL-MCNC: 14 MG/DL (ref 7–25)
CALCIUM SERPL-MCNC: 9.5 MG/DL (ref 8.6–10.3)
CHLORIDE SERPL-SCNC: 103 MEQ/L (ref 98–107)
CLARITY UR REFRACT.AUTO: CLEAR
CO2 SERPL-SCNC: 27 MEQ/L (ref 21–31)
COLOR UR AUTO: YELLOW
CREAT SERPL-MCNC: 0.6 MG/DL (ref 0.6–1.2)
D AG BLD QL: POSITIVE
ERYTHROCYTE [DISTWIDTH] IN BLOOD BY AUTOMATED COUNT: 12.6 % (ref 11.7–14.4)
GFR SERPL CREATININE-BSD FRML MDRD: >60 ML/MIN/1.73M*2
GLUCOSE SERPL-MCNC: 86 MG/DL (ref 70–99)
GLUCOSE UR STRIP.AUTO-MCNC: NEGATIVE MG/DL
HCT VFR BLDCO AUTO: 40 % (ref 35–45)
HGB BLD-MCNC: 13.1 G/DL (ref 11.8–15.7)
HGB UR QL STRIP.AUTO: NEGATIVE
HYALINE CASTS #/AREA URNS LPF: ABNORMAL /LPF
INR PPP: 0.9
KETONES UR STRIP.AUTO-MCNC: NEGATIVE MG/DL
LABORATORY COMMENT REPORT: NORMAL
LEUKOCYTE ESTERASE UR QL STRIP.AUTO: 1
MCH RBC QN AUTO: 30.7 PG (ref 28–33.2)
MCHC RBC AUTO-ENTMCNC: 32.8 G/DL (ref 32.2–35.5)
MCV RBC AUTO: 93.7 FL (ref 83–98)
NITRITE UR QL STRIP.AUTO: NEGATIVE
PA ADP PRP-ACNC: 193 PRU
PDW BLD AUTO: 9.8 FL (ref 9.4–12.3)
PH UR STRIP.AUTO: 6.5 [PH]
PLATELET # BLD AUTO: 231 K/UL (ref 150–369)
POTASSIUM SERPL-SCNC: 3.9 MEQ/L (ref 3.5–5.1)
PROT UR QL STRIP.AUTO: NEGATIVE
PROTHROMBIN TIME: 12.2 SEC (ref 12.2–14.5)
RBC # BLD AUTO: 4.27 M/UL (ref 3.93–5.22)
RBC #/AREA URNS HPF: ABNORMAL /HPF
SODIUM SERPL-SCNC: 137 MEQ/L (ref 136–145)
SP GR UR REFRACT.AUTO: 1.01
SPECIMEN EXP DATE BLD: NORMAL
SQUAMOUS URNS QL MICRO: ABNORMAL /HPF
UROBILINOGEN UR STRIP-ACNC: 0.2 EU/DL
WBC # BLD AUTO: 4.36 K/UL (ref 3.8–10.5)
WBC #/AREA URNS HPF: ABNORMAL /HPF

## 2023-07-21 PROCEDURE — 82310 ASSAY OF CALCIUM: CPT

## 2023-07-21 PROCEDURE — 85610 PROTHROMBIN TIME: CPT

## 2023-07-21 PROCEDURE — 85730 THROMBOPLASTIN TIME PARTIAL: CPT

## 2023-07-21 PROCEDURE — 81003 URINALYSIS AUTO W/O SCOPE: CPT

## 2023-07-21 PROCEDURE — 85576 BLOOD PLATELET AGGREGATION: CPT

## 2023-07-21 PROCEDURE — 36415 COLL VENOUS BLD VENIPUNCTURE: CPT

## 2023-07-21 PROCEDURE — 85027 COMPLETE CBC AUTOMATED: CPT

## 2023-07-21 PROCEDURE — 86850 RBC ANTIBODY SCREEN: CPT

## 2023-07-24 ENCOUNTER — TELEPHONE (OUTPATIENT)
Dept: NEUROSURGERY | Facility: CLINIC | Age: 83
End: 2023-07-24
Payer: COMMERCIAL

## 2023-07-24 NOTE — TELEPHONE ENCOUNTER
Spoke to patient via phone and explained that she is not a Plavix responder, P2Y12 193.    She will stop Plavix at this time and start Brilinta BID today. She is aware to continue medication until the morning of her procedure, Wed 7/26. She will continue asa 81 mg daily. She will  a 4 day sample of the medication today. All questions/ concerns addressed.

## 2023-07-26 ENCOUNTER — ANESTHESIA EVENT (OUTPATIENT)
Dept: CARDIOLOGY | Facility: HOSPITAL | Age: 83
Setting detail: HOSPITAL OUTPATIENT SURGERY
DRG: 027 | End: 2023-07-26
Payer: COMMERCIAL

## 2023-07-26 ENCOUNTER — HOSPITAL ENCOUNTER (INPATIENT)
Facility: HOSPITAL | Age: 83
LOS: 2 days | Discharge: HOME | DRG: 027 | End: 2023-07-28
Attending: STUDENT IN AN ORGANIZED HEALTH CARE EDUCATION/TRAINING PROGRAM | Admitting: STUDENT IN AN ORGANIZED HEALTH CARE EDUCATION/TRAINING PROGRAM
Payer: COMMERCIAL

## 2023-07-26 DIAGNOSIS — I67.1 CEREBRAL ANEURYSM, NONRUPTURED: ICD-10-CM

## 2023-07-26 LAB
ERYTHROCYTE [DISTWIDTH] IN BLOOD BY AUTOMATED COUNT: 12.2 % (ref 11.7–14.4)
HCT VFR BLDCO AUTO: 33 % (ref 35–45)
HGB BLD-MCNC: 11 G/DL (ref 11.8–15.7)
MCH RBC QN AUTO: 31.6 PG (ref 28–33.2)
MCHC RBC AUTO-ENTMCNC: 33.3 G/DL (ref 32.2–35.5)
MCV RBC AUTO: 94.8 FL (ref 83–98)
MRSA DNA SPEC QL NAA+PROBE: NEGATIVE
PDW BLD AUTO: 8.9 FL (ref 9.4–12.3)
PLATELET # BLD AUTO: 181 K/UL (ref 150–369)
POCT ACT-LR: 141 SEC (ref 113–149)
POCT ACT-LR: 242 SEC (ref 113–149)
POCT ACT-LR: 268 SEC (ref 113–149)
POCT ACT-LR: 273 SEC (ref 113–149)
POCT TEST: ABNORMAL
POCT TEST: NORMAL
RBC # BLD AUTO: 3.48 M/UL (ref 3.93–5.22)
WBC # BLD AUTO: 6.22 K/UL (ref 3.8–10.5)

## 2023-07-26 PROCEDURE — C1769 GUIDE WIRE: HCPCS | Performed by: STUDENT IN AN ORGANIZED HEALTH CARE EDUCATION/TRAINING PROGRAM

## 2023-07-26 PROCEDURE — 03VG3DZ RESTRICTION OF INTRACRANIAL ARTERY WITH INTRALUMINAL DEVICE, PERCUTANEOUS APPROACH: ICD-10-PCS | Performed by: STUDENT IN AN ORGANIZED HEALTH CARE EDUCATION/TRAINING PROGRAM

## 2023-07-26 PROCEDURE — C1894 INTRO/SHEATH, NON-LASER: HCPCS | Performed by: STUDENT IN AN ORGANIZED HEALTH CARE EDUCATION/TRAINING PROGRAM

## 2023-07-26 PROCEDURE — 25800000 HC PHARMACY IV SOLUTIONS: Performed by: STUDENT IN AN ORGANIZED HEALTH CARE EDUCATION/TRAINING PROGRAM

## 2023-07-26 PROCEDURE — 25800000 HC PHARMACY IV SOLUTIONS: Performed by: NURSE PRACTITIONER

## 2023-07-26 PROCEDURE — 61624 TCAT PERM OCCLS/EMBOLJ CNS: CPT | Performed by: STUDENT IN AN ORGANIZED HEALTH CARE EDUCATION/TRAINING PROGRAM

## 2023-07-26 PROCEDURE — 99222 1ST HOSP IP/OBS MODERATE 55: CPT | Mod: 25 | Performed by: STUDENT IN AN ORGANIZED HEALTH CARE EDUCATION/TRAINING PROGRAM

## 2023-07-26 PROCEDURE — 63600000 HC DRUGS/DETAIL CODE: Performed by: STUDENT IN AN ORGANIZED HEALTH CARE EDUCATION/TRAINING PROGRAM

## 2023-07-26 PROCEDURE — 76937 US GUIDE VASCULAR ACCESS: CPT | Performed by: STUDENT IN AN ORGANIZED HEALTH CARE EDUCATION/TRAINING PROGRAM

## 2023-07-26 PROCEDURE — C1887 CATHETER, GUIDING: HCPCS | Performed by: STUDENT IN AN ORGANIZED HEALTH CARE EDUCATION/TRAINING PROGRAM

## 2023-07-26 PROCEDURE — 37000001 HC ANESTHESIA GENERAL: Performed by: STUDENT IN AN ORGANIZED HEALTH CARE EDUCATION/TRAINING PROGRAM

## 2023-07-26 PROCEDURE — 36004150 HC NL ROOM CHARGE - 150 MINUTES: Performed by: STUDENT IN AN ORGANIZED HEALTH CARE EDUCATION/TRAINING PROGRAM

## 2023-07-26 PROCEDURE — 87641 MR-STAPH DNA AMP PROBE: CPT | Performed by: STUDENT IN AN ORGANIZED HEALTH CARE EDUCATION/TRAINING PROGRAM

## 2023-07-26 PROCEDURE — 20000000 HC ROOM AND CARE ICU

## 2023-07-26 PROCEDURE — 85027 COMPLETE CBC AUTOMATED: CPT | Performed by: STUDENT IN AN ORGANIZED HEALTH CARE EDUCATION/TRAINING PROGRAM

## 2023-07-26 PROCEDURE — C1757 CATH, THROMBECTOMY/EMBOLECT: HCPCS | Performed by: STUDENT IN AN ORGANIZED HEALTH CARE EDUCATION/TRAINING PROGRAM

## 2023-07-26 PROCEDURE — 75898 FOLLOW-UP ANGIOGRAPHY: CPT | Mod: 26 | Performed by: STUDENT IN AN ORGANIZED HEALTH CARE EDUCATION/TRAINING PROGRAM

## 2023-07-26 PROCEDURE — 75894 X-RAYS TRANSCATH THERAPY: CPT | Mod: 26 | Performed by: STUDENT IN AN ORGANIZED HEALTH CARE EDUCATION/TRAINING PROGRAM

## 2023-07-26 PROCEDURE — 76377 3D RENDER W/INTRP POSTPROCES: CPT | Mod: 26 | Performed by: STUDENT IN AN ORGANIZED HEALTH CARE EDUCATION/TRAINING PROGRAM

## 2023-07-26 PROCEDURE — 85347 COAGULATION TIME ACTIVATED: CPT | Performed by: STUDENT IN AN ORGANIZED HEALTH CARE EDUCATION/TRAINING PROGRAM

## 2023-07-26 PROCEDURE — 25000000 HC PHARMACY GENERAL: Performed by: STUDENT IN AN ORGANIZED HEALTH CARE EDUCATION/TRAINING PROGRAM

## 2023-07-26 PROCEDURE — 27800000 HC SUPPLY/IMPLANTS: Performed by: STUDENT IN AN ORGANIZED HEALTH CARE EDUCATION/TRAINING PROGRAM

## 2023-07-26 PROCEDURE — 36415 COLL VENOUS BLD VENIPUNCTURE: CPT | Performed by: STUDENT IN AN ORGANIZED HEALTH CARE EDUCATION/TRAINING PROGRAM

## 2023-07-26 PROCEDURE — 76937 US GUIDE VASCULAR ACCESS: CPT | Mod: 26 | Performed by: STUDENT IN AN ORGANIZED HEALTH CARE EDUCATION/TRAINING PROGRAM

## 2023-07-26 PROCEDURE — 63700000 HC SELF-ADMINISTRABLE DRUG: Performed by: NURSE PRACTITIONER

## 2023-07-26 PROCEDURE — 63600105 HC IODINE BASED CONTRAST: Mod: JW | Performed by: STUDENT IN AN ORGANIZED HEALTH CARE EDUCATION/TRAINING PROGRAM

## 2023-07-26 PROCEDURE — 27200000 HC STERILE SUPPLY: Performed by: STUDENT IN AN ORGANIZED HEALTH CARE EDUCATION/TRAINING PROGRAM

## 2023-07-26 DEVICE — WEB 17 SL 6X3
Type: IMPLANTABLE DEVICE | Site: CRANIAL | Status: FUNCTIONAL
Brand: WEB

## 2023-07-26 RX ORDER — PHENYLEPHRINE HYDROCHLORIDE 10 MG/ML
INJECTION INTRAVENOUS AS NEEDED
Status: DISCONTINUED | OUTPATIENT
Start: 2023-07-26 | End: 2023-07-26 | Stop reason: SURG

## 2023-07-26 RX ORDER — ATORVASTATIN CALCIUM 20 MG/1
40 TABLET, FILM COATED ORAL NIGHTLY
Status: DISCONTINUED | OUTPATIENT
Start: 2023-07-26 | End: 2023-07-28 | Stop reason: HOSPADM

## 2023-07-26 RX ORDER — LABETALOL HYDROCHLORIDE 5 MG/ML
10 INJECTION, SOLUTION INTRAVENOUS
Status: DISCONTINUED | OUTPATIENT
Start: 2023-07-26 | End: 2023-07-28 | Stop reason: HOSPADM

## 2023-07-26 RX ORDER — NOREPINEPHRINE BITARTRATE 0.02 MG/ML
INJECTION, SOLUTION INTRAVENOUS
Status: DISPENSED
Start: 2023-07-26 | End: 2023-07-27

## 2023-07-26 RX ORDER — HEPARIN SODIUM 1000 [USP'U]/ML
INJECTION, SOLUTION INTRAVENOUS; SUBCUTANEOUS AS NEEDED
Status: DISCONTINUED | OUTPATIENT
Start: 2023-07-26 | End: 2023-07-26 | Stop reason: SURG

## 2023-07-26 RX ORDER — VERAPAMIL HYDROCHLORIDE 2.5 MG/ML
INJECTION, SOLUTION INTRAVENOUS
Status: DISCONTINUED | OUTPATIENT
Start: 2023-07-26 | End: 2023-07-26 | Stop reason: HOSPADM

## 2023-07-26 RX ORDER — PROPOFOL 10 MG/ML
INJECTION, EMULSION INTRAVENOUS AS NEEDED
Status: DISCONTINUED | OUTPATIENT
Start: 2023-07-26 | End: 2023-07-26 | Stop reason: SURG

## 2023-07-26 RX ORDER — FENTANYL CITRATE 50 UG/ML
INJECTION, SOLUTION INTRAMUSCULAR; INTRAVENOUS AS NEEDED
Status: DISCONTINUED | OUTPATIENT
Start: 2023-07-26 | End: 2023-07-26 | Stop reason: SURG

## 2023-07-26 RX ORDER — ROCURONIUM BROMIDE 10 MG/ML
INJECTION, SOLUTION INTRAVENOUS AS NEEDED
Status: DISCONTINUED | OUTPATIENT
Start: 2023-07-26 | End: 2023-07-26 | Stop reason: SURG

## 2023-07-26 RX ORDER — HYDRALAZINE HYDROCHLORIDE 20 MG/ML
10 INJECTION INTRAMUSCULAR; INTRAVENOUS
Status: DISCONTINUED | OUTPATIENT
Start: 2023-07-26 | End: 2023-07-28 | Stop reason: HOSPADM

## 2023-07-26 RX ORDER — IODIXANOL 320 MG/ML
INJECTION, SOLUTION INTRAVASCULAR
Status: DISCONTINUED | OUTPATIENT
Start: 2023-07-26 | End: 2023-07-26 | Stop reason: HOSPADM

## 2023-07-26 RX ORDER — CHOLECALCIFEROL (VITAMIN D3) 25 MCG
1000 TABLET ORAL DAILY
Status: DISCONTINUED | OUTPATIENT
Start: 2023-07-26 | End: 2023-07-28 | Stop reason: HOSPADM

## 2023-07-26 RX ORDER — NOREPINEPHRINE BITARTRATE 0.02 MG/ML
.5-9 INJECTION, SOLUTION INTRAVENOUS
Status: DISCONTINUED | OUTPATIENT
Start: 2023-07-26 | End: 2023-07-28

## 2023-07-26 RX ORDER — LISINOPRIL 20 MG/1
20 TABLET ORAL NIGHTLY
Status: DISCONTINUED | OUTPATIENT
Start: 2023-07-26 | End: 2023-07-28 | Stop reason: HOSPADM

## 2023-07-26 RX ORDER — SODIUM CHLORIDE 9 MG/ML
INJECTION, SOLUTION INTRAVENOUS CONTINUOUS
Status: DISCONTINUED | OUTPATIENT
Start: 2023-07-26 | End: 2023-07-27

## 2023-07-26 RX ORDER — ACETAMINOPHEN 325 MG/1
650 TABLET ORAL EVERY 4 HOURS PRN
Status: DISCONTINUED | OUTPATIENT
Start: 2023-07-26 | End: 2023-07-28 | Stop reason: HOSPADM

## 2023-07-26 RX ORDER — SODIUM CHLORIDE 9 MG/ML
INJECTION, SOLUTION INTRAVENOUS CONTINUOUS PRN
Status: DISCONTINUED | OUTPATIENT
Start: 2023-07-26 | End: 2023-07-26 | Stop reason: SURG

## 2023-07-26 RX ORDER — HEPARIN SODIUM 1000 [USP'U]/ML
INJECTION, SOLUTION INTRAVENOUS; SUBCUTANEOUS
Status: DISCONTINUED | OUTPATIENT
Start: 2023-07-26 | End: 2023-07-26 | Stop reason: HOSPADM

## 2023-07-26 RX ORDER — LATANOPROST 50 UG/ML
1 SOLUTION/ DROPS OPHTHALMIC NIGHTLY
Status: DISCONTINUED | OUTPATIENT
Start: 2023-07-26 | End: 2023-07-28 | Stop reason: HOSPADM

## 2023-07-26 RX ORDER — FENTANYL CITRATE 50 UG/ML
50 INJECTION, SOLUTION INTRAMUSCULAR; INTRAVENOUS EVERY 5 MIN PRN
Status: DISCONTINUED | OUTPATIENT
Start: 2023-07-26 | End: 2023-07-28 | Stop reason: HOSPADM

## 2023-07-26 RX ORDER — ONDANSETRON HYDROCHLORIDE 2 MG/ML
INJECTION, SOLUTION INTRAVENOUS AS NEEDED
Status: DISCONTINUED | OUTPATIENT
Start: 2023-07-26 | End: 2023-07-26 | Stop reason: SURG

## 2023-07-26 RX ORDER — AMLODIPINE BESYLATE 5 MG/1
10 TABLET ORAL NIGHTLY
Status: DISCONTINUED | OUTPATIENT
Start: 2023-07-26 | End: 2023-07-28 | Stop reason: HOSPADM

## 2023-07-26 RX ORDER — IBUPROFEN 200 MG
16-32 TABLET ORAL AS NEEDED
Status: DISCONTINUED | OUTPATIENT
Start: 2023-07-26 | End: 2023-07-28 | Stop reason: HOSPADM

## 2023-07-26 RX ORDER — LABETALOL HCL 20 MG/4 ML
SYRINGE (ML) INTRAVENOUS AS NEEDED
Status: DISCONTINUED | OUTPATIENT
Start: 2023-07-26 | End: 2023-07-26 | Stop reason: SURG

## 2023-07-26 RX ORDER — ASPIRIN 81 MG/1
81 TABLET ORAL DAILY
Status: DISCONTINUED | OUTPATIENT
Start: 2023-07-27 | End: 2023-07-28 | Stop reason: HOSPADM

## 2023-07-26 RX ORDER — LIDOCAINE HYDROCHLORIDE 10 MG/ML
INJECTION, SOLUTION INFILTRATION; PERINEURAL AS NEEDED
Status: DISCONTINUED | OUTPATIENT
Start: 2023-07-26 | End: 2023-07-26 | Stop reason: SURG

## 2023-07-26 RX ORDER — MIDAZOLAM HYDROCHLORIDE 2 MG/2ML
INJECTION, SOLUTION INTRAMUSCULAR; INTRAVENOUS AS NEEDED
Status: DISCONTINUED | OUTPATIENT
Start: 2023-07-26 | End: 2023-07-26 | Stop reason: SURG

## 2023-07-26 RX ORDER — DEXTROSE 50 % IN WATER (D50W) INTRAVENOUS SYRINGE
25 AS NEEDED
Status: DISCONTINUED | OUTPATIENT
Start: 2023-07-26 | End: 2023-07-28 | Stop reason: HOSPADM

## 2023-07-26 RX ORDER — DEXTROSE 40 %
15-30 GEL (GRAM) ORAL AS NEEDED
Status: DISCONTINUED | OUTPATIENT
Start: 2023-07-26 | End: 2023-07-28 | Stop reason: HOSPADM

## 2023-07-26 RX ORDER — ONDANSETRON HYDROCHLORIDE 2 MG/ML
4 INJECTION, SOLUTION INTRAVENOUS EVERY 6 HOURS PRN
Status: DISCONTINUED | OUTPATIENT
Start: 2023-07-26 | End: 2023-07-28 | Stop reason: HOSPADM

## 2023-07-26 RX ORDER — EPHEDRINE SULFATE 50 MG/ML
INJECTION, SOLUTION INTRAVENOUS AS NEEDED
Status: DISCONTINUED | OUTPATIENT
Start: 2023-07-26 | End: 2023-07-26 | Stop reason: SURG

## 2023-07-26 RX ORDER — ONDANSETRON HYDROCHLORIDE 2 MG/ML
4 INJECTION, SOLUTION INTRAVENOUS
Status: DISCONTINUED | OUTPATIENT
Start: 2023-07-26 | End: 2023-07-28 | Stop reason: HOSPADM

## 2023-07-26 RX ADMIN — ONDANSETRON 4 MG: 2 INJECTION INTRAMUSCULAR; INTRAVENOUS at 11:56

## 2023-07-26 RX ADMIN — EPHEDRINE SULFATE 10 MG: 50 INJECTION, SOLUTION INTRAVENOUS at 10:59

## 2023-07-26 RX ADMIN — EPHEDRINE SULFATE 10 MG: 50 INJECTION, SOLUTION INTRAVENOUS at 11:00

## 2023-07-26 RX ADMIN — PHENYLEPHRINE HYDROCHLORIDE 50 MCG: 10 INJECTION INTRAVENOUS at 10:39

## 2023-07-26 RX ADMIN — LISINOPRIL 20 MG: 20 TABLET ORAL at 21:15

## 2023-07-26 RX ADMIN — HEPARIN SODIUM 500 UNITS: 1000 INJECTION, SOLUTION INTRAVENOUS; SUBCUTANEOUS at 11:48

## 2023-07-26 RX ADMIN — PHENYLEPHRINE HYDROCHLORIDE 50 MCG: 10 INJECTION INTRAVENOUS at 10:42

## 2023-07-26 RX ADMIN — HEPARIN SODIUM 4000 UNITS: 1000 INJECTION, SOLUTION INTRAVENOUS; SUBCUTANEOUS at 11:03

## 2023-07-26 RX ADMIN — SODIUM CHLORIDE: 9 INJECTION, SOLUTION INTRAVENOUS at 21:19

## 2023-07-26 RX ADMIN — HEPARIN SODIUM 1000 UNITS: 1000 INJECTION, SOLUTION INTRAVENOUS; SUBCUTANEOUS at 11:33

## 2023-07-26 RX ADMIN — ROCURONIUM BROMIDE 20 MG: 10 INJECTION INTRAVENOUS at 11:32

## 2023-07-26 RX ADMIN — Medication 1000 UNITS: at 14:02

## 2023-07-26 RX ADMIN — LATANOPROST 1 DROP: 50 SOLUTION OPHTHALMIC at 21:18

## 2023-07-26 RX ADMIN — FENTANYL CITRATE 50 MCG: 50 INJECTION, SOLUTION INTRAMUSCULAR; INTRAVENOUS at 10:33

## 2023-07-26 RX ADMIN — PROPOFOL 120 MG: 10 INJECTION, EMULSION INTRAVENOUS at 10:12

## 2023-07-26 RX ADMIN — LABETALOL 20 MG/4 ML (5 MG/ML) INTRAVENOUS SYRINGE 5 MG: at 12:00

## 2023-07-26 RX ADMIN — SODIUM CHLORIDE: 9 INJECTION, SOLUTION INTRAVENOUS at 16:30

## 2023-07-26 RX ADMIN — MIDAZOLAM HYDROCHLORIDE 2 MG: 1 INJECTION, SOLUTION INTRAMUSCULAR; INTRAVENOUS at 10:08

## 2023-07-26 RX ADMIN — ROCURONIUM BROMIDE 50 MG: 10 INJECTION INTRAVENOUS at 10:12

## 2023-07-26 RX ADMIN — PHENYLEPHRINE HYDROCHLORIDE 100 MCG: 10 INJECTION INTRAVENOUS at 10:46

## 2023-07-26 RX ADMIN — PHENYLEPHRINE HYDROCHLORIDE 100 MCG: 10 INJECTION INTRAVENOUS at 11:00

## 2023-07-26 RX ADMIN — ROCURONIUM BROMIDE 20 MG: 10 INJECTION INTRAVENOUS at 11:00

## 2023-07-26 RX ADMIN — SODIUM CHLORIDE 500 ML: 9 INJECTION, SOLUTION INTRAVENOUS at 14:53

## 2023-07-26 RX ADMIN — SODIUM CHLORIDE: 9 INJECTION, SOLUTION INTRAVENOUS at 10:07

## 2023-07-26 RX ADMIN — EPHEDRINE SULFATE 10 MG: 50 INJECTION, SOLUTION INTRAVENOUS at 10:52

## 2023-07-26 RX ADMIN — LIDOCAINE HYDROCHLORIDE 5 ML: 10 INJECTION, SOLUTION INFILTRATION; PERINEURAL at 10:12

## 2023-07-26 RX ADMIN — FENTANYL CITRATE 50 MCG: 50 INJECTION, SOLUTION INTRAMUSCULAR; INTRAVENOUS at 10:12

## 2023-07-26 RX ADMIN — SUGAMMADEX 200 MG: 100 INJECTION, SOLUTION INTRAVENOUS at 11:58

## 2023-07-26 RX ADMIN — TICAGRELOR 90 MG: 90 TABLET ORAL at 20:50

## 2023-07-26 RX ADMIN — ATORVASTATIN CALCIUM 40 MG: 20 TABLET, FILM COATED ORAL at 21:15

## 2023-07-26 RX ADMIN — HEPARIN SODIUM 500 UNITS: 1000 INJECTION, SOLUTION INTRAVENOUS; SUBCUTANEOUS at 11:18

## 2023-07-26 RX ADMIN — PHENYLEPHRINE HYDROCHLORIDE 100 MCG: 10 INJECTION INTRAVENOUS at 10:52

## 2023-07-26 ASSESSMENT — COGNITIVE AND FUNCTIONAL STATUS - GENERAL
WALKING IN HOSPITAL ROOM: 3 - A LITTLE
CLIMB 3 TO 5 STEPS WITH RAILING: 3 - A LITTLE
STANDING UP FROM CHAIR USING ARMS: 3 - A LITTLE
MOVING TO AND FROM BED TO CHAIR: 3 - A LITTLE

## 2023-07-26 NOTE — Clinical Note
Patient placed on procedure table in supine position. Positioning devices: all pressure points padded, arm board under arms, heel pads in place, safety strap applied, wrist straps in place and donut foam under head.

## 2023-07-26 NOTE — ANESTHESIA POSTPROCEDURE EVALUATION
Patient: Nelly Meyer    Procedure Summary     Date: 07/26/23 Room / Location: [facility] NEURO LAB 4 / PH CARDIAC CATH/EP/NEURO    Anesthesia Start: 1007 Anesthesia Stop: 1233    Procedure: Diagnostic cerebral angiogram with possible web device Diagnosis:       Cerebral aneurysm, nonruptured      (Cerebral aneurysm, nonruptured [I67.1])    Providers: Ramiro Rain MD Responsible Provider: Brisa Mir MD    Anesthesia Type: general ASA Status: 3          Anesthesia Type: general  PACU Vitals    No data found in the last 10 encounters.           Anesthesia Post Evaluation    Pain management: adequate  Patient location during evaluation: ICU  Patient participation: complete - patient participated  Level of consciousness: awake and alert  Cardiovascular status: acceptable  Airway Patency: adequate  Respiratory status: acceptable  Hydration status: acceptable  Anesthetic complications: no

## 2023-07-26 NOTE — PROGRESS NOTES
Night shift RN informed to trial 1cc of air from radial band, if bleeding occurs, reinflate immediately. Leave band on for 4-6 more hours. Bedside assessment of bleeding sites and hematomas.

## 2023-07-26 NOTE — ANESTHESIA PREPROCEDURE EVALUATION
Relevant Problems   CARDIOVASCULAR   (+) Hypertension      GASTROINTESTINAL   (+) Hiatal hernia      URINARY SYSTEM   (+) Hyponatremia       Anesthesia ROS/MED HX    Anesthesia History - neg  Pulmonary - neg  Cardiovascular   hypertension   ECG reviewed  Comments: Recent hospitalization and multiple medical evaluations noted.  Hematological    anemia  GI/Hepatic   Hiatal hernia   GERD  Musculoskeletal- neg  Renal Disease- neg  Endo/Other- neg  Body Habitus: Normal  ROS/MED HX Comments:    Neurology/Psychology: Known basilar tip aneurysm        Past Surgical History:   Procedure Laterality Date   • BREAST SURGERY      right breast segmental resection   • COLONOSCOPY     • ESOPHAGOGASTRODUODENOSCOPY     • MOHS SURGERY      nose   • ORIF ANKLE FRACTURE BIMALLEOLAR Left    • TEAR DUCT SURGERY Right     tear duct bypass       Physical Exam    Airway   Mallampati: III   TM distance: >3 FB   Neck ROM: full  Cardiovascular - normal   Rhythm: regular   Rate: normalPulmonary - normal   clear to auscultation  Other Findings   Recent hospitalization and multiple medical evaluations noted.  Dental    Teeth Problems: implants        Anesthesia Plan    Plan: general    Technique: general endotracheal     Lines and Monitors: PIV and arterial line     Airway: direct visual laryngoscopy   3 ASA  Anesthetic plan and risks discussed with: patient  Postop Plan:   Patient Disposition: ICU planned admission   Pain Management: IV analgesics  Comments:    Plan: Will discuss MAC vs GA with surgeon, patient agrees.

## 2023-07-26 NOTE — BRIEF OP NOTE
Diagnostic cerebral angiogram with possible web device Procedure Note    Procedure:    Diagnostic cerebral angiogram with possible web device  CPT(R) Code:  42503 - ME SLCTV CATH CAROTID/INNOM ART ANGIO XTRCRANL ART      Pre-op Diagnosis     * Cerebral aneurysm, nonruptured [I67.1]       Post-op Diagnosis     * Cerebral aneurysm, nonruptured [I67.1]    Surgeon(s) and Role:     * Ramiro Rain MD - Primary    Anesthesia: General    Staff:   Circulator: Medhat Calhoun; Kayli Javier; Mitch Chadwick, RTR  Scrub Person: Flower Chávez RCIS  Documenter: Trish Case RN    Procedure Details   R radial   Ballast 088, 90 cm  Lizy EX 5Fr, 115 cm  VIA 17    Deployed WEB SL 6 mm x 3 mm     Excellent stasis on final runs     Estimated Blood Loss: No blood loss documented.    Specimens:                No specimens collected during this procedure.      Drains:   Urethral Catheter 16 Fr (Active)       Implants:   Implant Name Type Inv. Item Serial No.  Lot No. LRB No. Used Action   WEB 17 SINGLE LAYER 6MM X 3MM - [id number]  WEB 17 SINGLE LAYER 6MM X 3MM  TERUMO MICROVENTION [id number] N/A 1 Implanted   WEB DETACHMENT CONTROLLER - [id number]  WEB DETACHMENT CONTROLLER  TERUMO MICROVENTION  N/A 1 Adjusted   WEB DETACHMENT CONTROLLER - [id number]  WEB DETACHMENT CONTROLLER  TERUMO MICROVENTION  N/A 1 Adjusted              Complications:  None; patient tolerated the procedure well.           Disposition: ICU - extubated and stable.           Condition: stable    Ramiro Rain MD  Phone Number: [phone]

## 2023-07-26 NOTE — PROGRESS NOTES
Patient is now S/P Diagnostic Cerebral Angiogram with WALLER embolization. POD0    Physical Exam:  Con: NAD  MS: AAOx3, answering complex questions, following commands  CN: PERRL, EOMI, no facial asymmetry, tongue midline  Motor: 5/5 throughout  Sensory: light touch intact in all four extremities  Gait: Deferred secondary to fall risk   EYES:  as above, anicteric, no conjunctival pallor  MSK: no edema  Derm: no rashes or breakdown  Right radial site: VAS band in place, clean, dry and intact with no hematoma     Assesment/Plan:   In summary, Nelly Meyer is a 82 y.o. female with a PMHx pf HTN, HLD and osteoporosis. 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   DSA on 6/15/2023 confirmed a basilar tip aneurysm measuring nearly 6 mm in its greatest dimension with a celestin's excresence along the dome. She is now S/P diagnostic cerberal angiogram with WALLER embolization POD0.   -Admit to ICU  -Q1 hour neuro checks  -Right radial band 2 hours precautions  --140  -Brillinta 90mg this evening  -Continue Brillinta BID and ASA 81mg daily  -Advance diet as tolerated  -Likely plan for discharge home tomorrow if patient remains stable  -Follow up with Dr. Rain in 4 weeks no repeat imagining needed     MARIA TERESA Kaufman

## 2023-07-26 NOTE — PROGRESS NOTES
NSGY Attending Note    Patient seen and examined in the holding area.     Denies any complaints this morning. She took her Asa 81 mg and Brilinta 90 mg.     Neurologically the patient is AAOx3  PERRL  EOMI  Face Symmetric  Tongue Midline  Moving all extremities full strength 5/5 no drift    I again reviewed the plan with the patient. Plan is for transradial vs. Transfemoral diagnostic cerebral angiogram and embolization of basilar tip aneurysm with WEB device possible balloon vs. Stent assist, possible stent assisted coiling, possible doreen/particles/glue, FDA vs. Non-FDA approved use of devices.     I reviewed the risks of the procedure which include but are not limited to: pain,bleeding, infection, cranial nerve palsy, double or blurry vision, stroke, subarachnoid hemorrhage, intracranial hemorrhage, intraoperative aneurysm rupture, access site complications which can include bleeding, hematoma, pseudoaneurysm, compartment syndrome, retroperitoneal hematoma, limb loss from angiography, and need for additional procedures and surgeries, as well as paralysis, coma and death.     Everything explained in layman's terms to the patient, and her brother and sister, who are the patient's next of kin/POAs and are present in the lobby. I spoke to all the above in person. Ample time offered for questions, answers and second opinion.     The patient and her family are in agreement to proceed.     Ramiro Rain MD

## 2023-07-26 NOTE — PROGRESS NOTES
NSGY Attending Note    Pt became spontaneously hypotensive, SBPs in 70s/80s  Responded to fluid bolus and being laid flat    I just went to examine the patient    She is awake and alert   Denies headache   Some discomfort at right radial band site    SBP 130s/140s/70s  Repeat CBC sent, Hgb is 11 (Hgb 13.1 on 7/21)    She is receiving normal saline bolus  Not on any drips    Neurologically the patient is AAOx3  PERRL  EOMI  Face Symmetric  Tongue Midline  Moving all extremities full strength    RN notified us that when she attempted to remove 1 cc from vasc band at 2 PM, there was bleeding from radial puncture site  Vasc band was immediately re-inflated    No evidence of hematoma at radial puncture site  There is some dried blood and ecchymosis, which is to be expected  Forearm is soft  Hand is warm and well perfused, pulse ox 96-98% (R index finger)    Plan  - continue q1 hour neuro checks  - maintain pt on standing fluids, NSS 80 cc/hour  - -150  - R radial band to remain inflated until 6 pm, at that time RN can try to remove 1 cc air, if any bleeding she will immediately re-inflate and will leave inflated for another 4-6 hours.   - hgb value is likely hemodilutional and from procedure; while she has had some oozing, she is not demonstrating any other signs or symptoms consistent with acute blood loss and certainly none of her sites have had significant bleeding to cause a drop in hgb     I spoke to the patient and explained the above plan. She understands and was thankful.     Ramiro Rain MD

## 2023-07-26 NOTE — ANESTHESIA PROCEDURE NOTES
Arterial Line:    Start time: 7/26/2023 10:17 AM  End time: 7/26/2023 10:20 AM    An arterial line was placed in the OR for the following indication(s): continuous blood pressure monitoring and blood sampling needed.    A 20 G (size), 1 and 3/4 inch (length), Arrow (type) catheter was placed,   Seldinger technique used, into the Left radial artery, secured by Tegaderm.      Procedure performed using ultrasound guidance.  Image captured and stored.  Image visualization comments: Ultrasound used to visualize the placement of wire and/or needle in appropriate artery.    Events:  patient tolerated procedure well with no complications.    The supervising anesthesiologist was   Performed By: anesthesiologist  Attending: Brisa Mir MD

## 2023-07-26 NOTE — ANESTHESIOLOGIST PRE-PROCEDURE ATTESTATION
Pre-Procedure Patient Identification:  I am the Primary Anesthesiologist and have identified the patient on 07/26/23 at 7:22 AM.   I have confirmed the procedure(s) will be performed by the following surgeon/proceduralist Ramiro Rain MD.

## 2023-07-26 NOTE — CONSULTS
Visited with Ms. Lewisprosper while rounding on ICU.  Her family was at the bedside.  She shared her medical journey.  Provided Rosary for her.  Thanks expressed.    Steph Garcia  Spiritual Care Specialist  #[phone]

## 2023-07-26 NOTE — Clinical Note
Neuro monitoring sites improved, patient transported to ICU room 3213 with nurse, anesthesia and monitor

## 2023-07-26 NOTE — ANESTHESIA PROCEDURE NOTES
Airway  Urgency: elective    Start Time: 7/26/2023 10:14 AM    General Information and Staff    Patient location during procedure: OR  Anesthesiologist: Brisa Mir MD  Performed by: Brisa Mir MD  Authorized by: Brisa Mir MD      Indications and Patient Condition  Indications for airway management: anesthesia  Sedation level: deep  Preoxygenated: yes  Patient position: sniffing  Mask difficulty assessment: 1 - vent by mask    Final Airway Details  Final airway type: endotracheal airway      Successful airway: ETT  Cuffed: yes   Successful intubation technique: video laryngoscopy  Facilitating devices/methods: intubating stylet  Endotracheal tube insertion site: oral  Blade: Kelly  Blade size: #3  ETT size (mm): 7.0  Cormack-Lehane Classification: grade I - full view of glottis  Placement verified by: chest auscultation and capnometry   Measured from: lips  ETT to lips (cm): 21  Number of attempts at approach: 1  Atraumatic airway insertion

## 2023-07-26 NOTE — INTERVAL H&P NOTE
H&P reviewed. The patient was examined and there are no changes to the H&P.    Patients P2Y12 was 193 on 7/21. Patient was switched to Brillinta and Asa. She took Brillinta and Asa this morning.

## 2023-07-26 NOTE — PLAN OF CARE
Care Coordination Admission Assessment Note    General Information:  Readmission Within the last 30 days: no previous admission in last 30 days  Does patient have a : No  Patient-Specific Goals (include timeframe): did not state    Living Arrangements:  Arrived From: home  Current Living Arrangements: condominium  People in Home: alone  Home Accessibility: stairs to enter home (Group)  Living Arrangement Comments: 1 step to enter & no steps in the condo    Housing Stability and Financial Resources (SDOH):  In the last 12 months, was there a time when you were not able to pay the mortgage or rent on time?: No  In the last 12 months, how many places have you lived?: 1  In the last 12 months, was there a time when you did not have a steady place to sleep or slept in a shelter (including now)?: No  How hard is it for you to pay for the very basics like food, housing, medical care, and heating?: Not hard at all    Functional Status Prior to Admission:   Assistive Device/Animal Currently Used at Home: walker, front-wheeled, cane, straight  Functional Status Comments: patient was able to do her own adls & home management  IADL Comments:       Supports and Services:  Current Outpatient/Agency/Support Group: none  Type of Current Home Care Services:    History of home care episode or rehab stay: is known to [facility] care    Discharge Needs Assessment:   Concerns to be Addressed: adjustment to diagnosis/illness, care coordination/care conferences, discharge planning  Current Discharge Risk: lives alone  Anticipated Changes Related to Illness: none, inability to care for self    Patient/Family Anticipated Discharge Plan:  Patient/Family Anticipates Transition To: home, home with help/services, skilled nursing facility  Patient/Family Anticipated Services at Transition: home health care, skilled nursing    Connection to Community  Patient declined offered resources.      Patient Choice:    Offered/Gave Vendor List: yes  Patient's Choice of Community Agency(s): discussed d/c planning options with poncho such as home care but await pt/ot evals  Patient and/or patient guardian/advocate was made aware of their right to choose a provider. A list of eligible providers was presented and reviewed with the patient and/or patient guardian/advocate in written and/or verbal form. The list delineates providers in the patient’s desired geographic area who are participating in the Medicare program and/or providers contracted with the patient’s primary insurance. The Medicare list and quality ratings were obtained from the Medicare.gov [medicare.gov] website.    Anticipated Discharge Plan:  Met with patient. Provided education and contact information for Care Coordination services.: yes  Anticipated Discharge Disposition: home with home health  Type of Home Care Services: home OT, home PT, nursing      Transportation Needs (SDOH):  Transportation Concerns:    Transportation Anticipated: car, drives self, family or friend will provide  Is Out of Hospital DNR needed at discharge?: no    In the past 12 months, has lack of transportation kept you from medical appointments or from getting medications?: No  In the past 12 months, has lack of transportation kept you from meetings, work, or from getting things needed for daily living?: No    Concerns - comments: patient lives alone & will need pt/ot eval     Sw met with luis felipe at bedside & went over role of care coordination & d/c planning.  Patient lives alone & stated that she was managing very well at home. [facility]. Will see if patient will need pt/ot evals prior to d/c.

## 2023-07-26 NOTE — PLAN OF CARE
Problem: Adult Inpatient Plan of Care  Goal: Plan of Care Review  Outcome: Progressing  Flowsheets (Taken 7/26/2023 1824)  Progress: improving  Outcome Evaluation: Pt VSS, neuro checks and neurovascular checks stable. Pt had event of lightheaded and hypotension. Dr Rain at bedside. 500 bolus given with improvement of BPs. A line intact. 1cc of air removed from R radial compression band with immediate blood from site, 1cc re inflated. Per Dr. Rain, wait till 2000 to remove air with assessment of output and strict NV checks of RUE. RUE is ecchymotic, soft, warm, and +2 radial pulse. Scattered procedurial puncture sites with bloody output- thrombogel applied and dressings assessed. Continue with plan of care  Plan of Care Reviewed With: patient     Problem: Stroke, Ischemic (Includes Transient Ischemic Attack)  Goal: Optimal Coping  Outcome: Progressing  Goal: Optimal Functional Ability  Outcome: Progressing  Goal: Effective Urinary Elimination  Outcome: Progressing   Plan of Care Review  Plan of Care Reviewed With: patient  Progress: improving  Outcome Evaluation: Pt VSS, neuro checks and neurovascular checks stable. Pt had event of lightheaded and hypotension. Dr Rain at bedside. 500 bolus given with improvement of BPs. A line intact. 1cc of air removed from R radial compression band with immediate blood from site, 1cc re inflated. Per Dr. Rain, wait till 2000 to remove air with assessment of output and strict NV checks of RUE. RUE is ecchymotic, soft, warm, and +2 radial pulse. Scattered procedurial puncture sites with bloody output- thrombogel applied and dressings assessed. Continue with plan of care

## 2023-07-26 NOTE — CONSULTS
"   Critical Care Consult Note       Admitting Diagnosis   Basilar tip aneurysm    Subjective    Nelly Meyer is a 82 y.o. female with a PMHx of HTN, HLD and osteoporosis who presented to the [facility] ED on 5/21/23 after a fall. Incidentally it was found on CTH and CTA imaging, a 7 mm basilar tip aneurysm, prompting neurointervention consult. She underwent a diagnostic angiogram on 6/15/23 which showed a 4.5x5.0x4.7 mm basilar tip aneurysm. She was placed on ASA and Brilinta pre-procedure. She presented today for an elective WEB embolization by Dr. Rain and was admitted to the ICU post-procedure.  Her neuro exam is stable post-op. She had an episode of hypotension (BP 78/56) and feeling like she \"was going to pass out\" post procedure which resolved with lying flat and a fluid bolus. Repeat hemoglobin was 11, down from 13.1. No obvious signs of bleeding. Right radial band in place with balloon inflated. No hematoma noted.     Pertinent radiology results reviewed. Pertinent lab results reviewed.    Past Medical History:   Diagnosis Date   • Anemia     iron def.   • Basal cell carcinoma     nose , Moh's surgery   • Constipation    • Gallstones    • GERD (gastroesophageal reflux disease)    • Hiatal hernia    • Hypertension    • Kidney stone     found by ultrasound, no problem presently   • Lipid disorder    • Osteoporosis    • Postmenopausal      Past Surgical History:   Procedure Laterality Date   • BREAST SURGERY      right breast segmental resection   • COLONOSCOPY     • ESOPHAGOGASTRODUODENOSCOPY     • MOHS SURGERY      nose   • ORIF ANKLE FRACTURE BIMALLEOLAR Left    • TEAR DUCT SURGERY Right     tear duct bypass      Social History     Socioeconomic History   • Marital status: Single     Spouse name: None   • Number of children: None   • Years of education: None   • Highest education level: None   Tobacco Use   • Smoking status: Former   • Smokeless tobacco: Never   Vaping Use   • Vaping Use: Never used "   Substance and Sexual Activity   • Alcohol use: Not Currently     Comment: Patient reports she has not been drinking since her fall in May   • Drug use: No   • Sexual activity: Defer     Social Determinants of Health     Financial Resource Strain: Low Risk  (5/22/2023)    Overall Financial Resource Strain (CARDIA)    • Difficulty of Paying Living Expenses: Not hard at all   Food Insecurity: No Food Insecurity (5/22/2023)    Hunger Vital Sign    • Worried About Running Out of Food in the Last Year: Never true    • Ran Out of Food in the Last Year: Never true   Transportation Needs: No Transportation Needs (5/22/2023)    PRAPARE - Transportation    • Lack of Transportation (Medical): No    • Lack of Transportation (Non-Medical): No   Stress: No Stress Concern Present (5/22/2023)    [employer] - Occupational Stress Questionnaire    • Feeling of Stress : Not at all   Housing Stability: Unknown (5/22/2023)    Housing Stability Vital Sign    • Unable to Pay for Housing in the Last Year: No    • Unstable Housing in the Last Year: No     History reviewed. No pertinent family history.  Patient has no known allergies.  Current Facility-Administered Medications   Medication Dose Route Frequency Provider Last Rate Last Admin   • acetaminophen (TYLENOL) tablet 650 mg  650 mg oral q4h PRN Kymberly Nielson NP       • amLODIPine (NORVASC) tablet 10 mg  10 mg oral Nightly Sondra Hitchcock CRNP       • [START ON 7/27/2023] aspirin enteric coated tablet 81 mg  81 mg oral Daily Sondra Hitchcock CRNP       • atorvastatin (LIPITOR) tablet 40 mg  40 mg oral Nightly Sondra Hitchcock CRNP       • cholecalciferol (vitamin D3) tablet 1,000 Units  1,000 Units oral Daily Sondra Hitchcock CRNP       • glucose chewable tablet 16-32 g of dextrose  16-32 g of dextrose oral PRN Kymberly Nielson NP        Or   • dextrose 40 % oral gel 15-30 g of dextrose  15-30 g of dextrose oral PRN Kymberly Nielson NP        Or   •  glucagon (GLUCAGEN) injection 1 mg  1 mg intramuscular PRN Kymberly Nielson NP        Or   • dextrose 50 % in water (D50) injection 12.5 g  25 mL intravenous PRN Kymberly Nielson NP       • fentaNYL (PF) (SUBLIMAZE) injection 50 mcg  50 mcg intravenous q5 min PRN Brisa Mir MD       • labetaloL (NORMODYNE,TRANDATE) injection 10 mg  10 mg intravenous q15 min PRN Kymberly Nielson NP        Or   • hydrALAZINE (APRESOLINE) injection 10 mg  10 mg intravenous q15 min PRN Kymberly Nielson NP       • latanoprost (XALATAN) 0.005 % ophthalmic solution 1 drop  1 drop Both Eyes Nightly Sondra Hitchcock CRNP       • lisinopriL (PRINIVIL) tablet 20 mg  20 mg oral Nightly Sondra Hitchcock CRNP       • niCARdipine (CARDENE) 25 mg/250 mL (0.1 mg/mL) in NSS  2.5-15 mg/hr intravenous Titrated Kymberly Nielson NP       • ondansetron (ZOFRAN) injection 4 mg  4 mg intravenous q15 min PRN Brisa Mir MD       • ondansetron (ZOFRAN) injection 4 mg  4 mg intravenous q6h PRN Kymberly Nielson NP       • povidone-iodine 5 % kit 1 each  1 Application Each Nostril 60 min Pre-Op Kymberly Nielson NP       • ticagrelor (BRILINTA) tablet 90 mg  90 mg oral BID Sondra Hitchcock CRNP           Review of Systems  All other systems reviewed and negative except as noted in the HPI.    Vital signs in last 24 hours:  Temp:  [37.4 °C (99.3 °F)] 37.4 °C (99.3 °F)  Heart Rate:  [66-92] 70  Resp:  [16-26] 25  BP: (136-204)/(50-91) 138/63                            Objective       Vital signs in last 24 hours:  Temp:  [37.4 °C (99.3 °F)] 37.4 °C (99.3 °F)  Heart Rate:  [66-92] 70  Resp:  [16-26] 25  BP: (136-204)/(50-91) 138/63    Physical Exam   General: lying in bed. Appears comfortable  Skin: warm and dry  HEENT: NC/AT  Cardiac: regular rhythm and rate  Respiratory: non-labored breathing  GI: soft, NT  : deferred      Neurologic Exam  Aox4. GCS15. Speech clear  Face Symmetric. Tongue midline  PERRL 3mm  EOMI  LINCOLN to command  5/5 strength  throughout  No pronator drift  Sensation grossly intact to soft touch in all extremities    Access site  Right radial  C/d/I with band   Labs  CBC Results       07/21/23 05/23/23 05/22/23     0959 0425 0354    WBC 4.36 4.53 5.99    RBC 4.27 3.75 4.04    HGB 13.1 11.7 12.6    HCT 40.0 34.7 36.9    MCV 93.7 92.5 91.3    MCH 30.7 31.2 31.2    MCHC 32.8 33.7 34.1     171 187        BMP Results       07/21/23 05/23/23 05/22/23     0959 0425 0354     131 132    K 3.9 3.9 3.1    Cl 103 100 100    CO2 27 25 27    Glucose 86 100 123    BUN 14 27 18    Creatinine 0.6 0.6 0.6    Calcium 9.5 9.0 9.4    Anion Gap 7 6 5    EGFR >60.0 >60.0 >60.0          Imaging  No results found.       ASSESSMENT   83 y/o F with an incidentally discovered 7 mm basilar tip aneurysm which was revealed on a CTH and subsequent CTA after she sustained a mechanical fall in May 2023. Today she presents for an elective WEB embolization by Dr. Rain.       PLAN   1. Basilar tip aneurysm   -s/p WEB embo by Dr. Rain on 7/26. POD #0  -admit to ICU  -continue q1 hour  Neuro checks  -SBP goal 100-150  -continue ASA 81 mg and brilinta 90 mg BID  -right radial band precautions x 6 hours  -advance diet as tolerated  -activity as tolerated after band precautions finished  -d/c loraine in AM  -Neuro IR following    2.Hypertension  -SBP goals 100-150 post procedure  -cardene gtt PRN  -continue home norvasc and lisinoprol    3. Hyperlipidemia  -continue lipitor      4. Anemia  -post-procedure 11 down from 13.1 on 7/21  -no obvious signs of bleeding. Right radial access site with band and balloon inflated intact. No hematoma  -continue to trend       The case was reviewed this morning at the patient's beside multidisciplinary rounds with the patient's nurse, dietician, pharmacist, respiratory therapist, physical therapist and critical care nurse coordinator. The patient's clinical status with regards to diagnosis, treatment plans including  disposition of any IV, arterial lines, Lopez and tubes were discussed, as well as dietary, respiratory therapy and mobilization needs.      This case was discussed with consultants.     Total critical time spent managing cerebral aneurysm, HTN on this patient excluding any procedure time totals 45 minutes     This note was scribed by MARIA TERESA Hatch in my presence on my behalf.

## 2023-07-27 LAB
ANION GAP SERPL CALC-SCNC: 4 MEQ/L (ref 3–15)
BASOPHILS # BLD: 0.04 K/UL (ref 0.01–0.1)
BASOPHILS NFR BLD: 1 %
BUN SERPL-MCNC: 11 MG/DL (ref 7–25)
CALCIUM SERPL-MCNC: 8.4 MG/DL (ref 8.6–10.3)
CHLORIDE SERPL-SCNC: 111 MEQ/L (ref 98–107)
CO2 SERPL-SCNC: 24 MEQ/L (ref 21–31)
CREAT SERPL-MCNC: 0.5 MG/DL (ref 0.6–1.2)
DIFFERENTIAL METHOD BLD: ABNORMAL
EOSINOPHIL # BLD: 0.1 K/UL (ref 0.04–0.36)
EOSINOPHIL NFR BLD: 2.4 %
ERYTHROCYTE [DISTWIDTH] IN BLOOD BY AUTOMATED COUNT: 12.4 % (ref 11.7–14.4)
GFR SERPL CREATININE-BSD FRML MDRD: >60 ML/MIN/1.73M*2
GLUCOSE SERPL-MCNC: 99 MG/DL (ref 70–99)
HCT VFR BLDCO AUTO: 29.1 % (ref 35–45)
HGB BLD-MCNC: 9.7 G/DL (ref 11.8–15.7)
IMM GRANULOCYTES # BLD AUTO: 0.01 K/UL (ref 0–0.08)
IMM GRANULOCYTES NFR BLD AUTO: 0.2 %
LYMPHOCYTES # BLD: 0.9 K/UL (ref 1.2–3.5)
LYMPHOCYTES NFR BLD: 21.7 %
MAGNESIUM SERPL-MCNC: 1.7 MG/DL (ref 1.9–2.7)
MCH RBC QN AUTO: 31.7 PG (ref 28–33.2)
MCHC RBC AUTO-ENTMCNC: 33.3 G/DL (ref 32.2–35.5)
MCV RBC AUTO: 95.1 FL (ref 83–98)
MONOCYTES # BLD: 0.55 K/UL (ref 0.28–0.8)
MONOCYTES NFR BLD: 13.3 %
NEUTROPHILS # BLD: 2.55 K/UL (ref 1.7–7)
NEUTS SEG NFR BLD: 61.4 %
NRBC BLD-RTO: 0 %
PDW BLD AUTO: 9.5 FL (ref 9.4–12.3)
PLATELET # BLD AUTO: 166 K/UL (ref 150–369)
POTASSIUM SERPL-SCNC: 3.6 MEQ/L (ref 3.5–5.1)
RBC # BLD AUTO: 3.06 M/UL (ref 3.93–5.22)
SODIUM SERPL-SCNC: 139 MEQ/L (ref 136–145)
WBC # BLD AUTO: 4.15 K/UL (ref 3.8–10.5)

## 2023-07-27 PROCEDURE — 63700000 HC SELF-ADMINISTRABLE DRUG: Performed by: STUDENT IN AN ORGANIZED HEALTH CARE EDUCATION/TRAINING PROGRAM

## 2023-07-27 PROCEDURE — 36415 COLL VENOUS BLD VENIPUNCTURE: CPT | Performed by: NURSE PRACTITIONER

## 2023-07-27 PROCEDURE — 80048 BASIC METABOLIC PNL TOTAL CA: CPT | Performed by: NURSE PRACTITIONER

## 2023-07-27 PROCEDURE — 20000000 HC ROOM AND CARE ICU

## 2023-07-27 PROCEDURE — 99233 SBSQ HOSP IP/OBS HIGH 50: CPT | Performed by: STUDENT IN AN ORGANIZED HEALTH CARE EDUCATION/TRAINING PROGRAM

## 2023-07-27 PROCEDURE — 85025 COMPLETE CBC W/AUTO DIFF WBC: CPT | Performed by: NURSE PRACTITIONER

## 2023-07-27 PROCEDURE — 63600000 HC DRUGS/DETAIL CODE: Mod: JZ | Performed by: STUDENT IN AN ORGANIZED HEALTH CARE EDUCATION/TRAINING PROGRAM

## 2023-07-27 PROCEDURE — 83735 ASSAY OF MAGNESIUM: CPT | Performed by: STUDENT IN AN ORGANIZED HEALTH CARE EDUCATION/TRAINING PROGRAM

## 2023-07-27 PROCEDURE — 63700000 HC SELF-ADMINISTRABLE DRUG: Performed by: NURSE PRACTITIONER

## 2023-07-27 RX ORDER — POTASSIUM CHLORIDE 14.9 MG/ML
20 INJECTION INTRAVENOUS AS NEEDED
Status: DISCONTINUED | OUTPATIENT
Start: 2023-07-27 | End: 2023-07-28 | Stop reason: HOSPADM

## 2023-07-27 RX ORDER — POTASSIUM CHLORIDE 750 MG/1
40 TABLET, EXTENDED RELEASE ORAL AS NEEDED
Status: DISCONTINUED | OUTPATIENT
Start: 2023-07-27 | End: 2023-07-28 | Stop reason: HOSPADM

## 2023-07-27 RX ORDER — POTASSIUM CHLORIDE 750 MG/1
20 TABLET, EXTENDED RELEASE ORAL AS NEEDED
Status: DISCONTINUED | OUTPATIENT
Start: 2023-07-27 | End: 2023-07-28 | Stop reason: HOSPADM

## 2023-07-27 RX ADMIN — TICAGRELOR 90 MG: 90 TABLET ORAL at 09:47

## 2023-07-27 RX ADMIN — MAGNESIUM SULFATE 2 G: 2 INJECTION INTRAVENOUS at 06:23

## 2023-07-27 RX ADMIN — LISINOPRIL 20 MG: 20 TABLET ORAL at 21:01

## 2023-07-27 RX ADMIN — TICAGRELOR 90 MG: 90 TABLET ORAL at 21:00

## 2023-07-27 RX ADMIN — Medication 1000 UNITS: at 09:47

## 2023-07-27 RX ADMIN — POTASSIUM CHLORIDE 20 MEQ: 750 TABLET, EXTENDED RELEASE ORAL at 06:24

## 2023-07-27 RX ADMIN — ASPIRIN 81 MG: 81 TABLET, COATED ORAL at 09:47

## 2023-07-27 RX ADMIN — LATANOPROST 1 DROP: 50 SOLUTION OPHTHALMIC at 21:04

## 2023-07-27 RX ADMIN — ATORVASTATIN CALCIUM 40 MG: 20 TABLET, FILM COATED ORAL at 21:01

## 2023-07-27 RX ADMIN — AMLODIPINE BESYLATE 10 MG: 5 TABLET ORAL at 21:01

## 2023-07-27 ASSESSMENT — COGNITIVE AND FUNCTIONAL STATUS - GENERAL
CLIMB 3 TO 5 STEPS WITH RAILING: 3 - A LITTLE
WALKING IN HOSPITAL ROOM: 3 - A LITTLE
MOVING TO AND FROM BED TO CHAIR: 3 - A LITTLE
STANDING UP FROM CHAIR USING ARMS: 3 - A LITTLE

## 2023-07-27 NOTE — PLAN OF CARE
Plan of Care Review  Plan of Care Reviewed With: patient  Progress: improving  Outcome Evaluation: Pt receiving frequent neuro and neurovascular checks without changes. Pt on 1 L of oxygen. TR removed overnight per Dr. Rain without additional bleeding. Pt educated on skin breakdown prevention and turned regularly. Pt educated on fall prevention plan, including call light use. Pt educated on post-stroke anticoagulat use.

## 2023-07-27 NOTE — PROGRESS NOTES
Vascular NeuroSurgery Progress Note    Subjective: Patient appears comfortable resting in stretcher eating breakfast.       Interval History: Vas band removed at 0300 without bleeding or complications. Patient having frequent PVCs on monitor without complaints.   Lopez D/C  Asa and brillinta given this AM.     Review of Systems: Negative except for what is stated above    Medications:  •  acetaminophen, 650 mg, oral, q4h PRN  •  amLODIPine, 10 mg, oral, Nightly  •  aspirin, 81 mg, oral, Daily  •  atorvastatin, 40 mg, oral, Nightly  •  cholecalciferol (vitamin D3), 1,000 Units, oral, Daily  •  glucose, 16-32 g of dextrose, oral, PRN **OR** dextrose, 15-30 g of dextrose, oral, PRN **OR** glucagon, 1 mg, intramuscular, PRN **OR** dextrose 50 % in water (D50), 25 mL, intravenous, PRN  •  fentaNYL, 50 mcg, intravenous, q5 min PRN  •  labetalol, 10 mg, intravenous, q15 min PRN **OR** hydrALAZINE, 10 mg, intravenous, q15 min PRN  •  latanoprost, 1 drop, Both Eyes, Nightly  •  lisinopriL, 20 mg, oral, Nightly  •  magnesium sulfate, 1 g, intravenous, PRN **OR** magnesium sulfate, 2 g, intravenous, PRN  •  niCARdipine, 2.5-15 mg/hr, intravenous, Titrated  •  norepinephrine, 0.5-90 mcg/min, intravenous, Titrated  •  ondansetron, 4 mg, intravenous, q15 min PRN  •  ondansetron, 4 mg, intravenous, q6h PRN  •  potassium chloride, 20 mEq, oral, PRN  •  potassium chloride, 40 mEq, oral, PRN  •  potassium chloride, 20 mEq, intravenous, PRN  •  potassium chloride in water, 20 mEq, intravenous, PRN **AND** potassium chloride in water, 20 mEq, intravenous, PRN  •  povidone-iodine, 1 Application, Each Nostril, 60 min Pre-Op  •  sodium chloride 0.9 %, , intravenous, Continuous  •  ticagrelor, 90 mg, oral, BID    Objective  Vitals:  Vitals:    07/27/23 0400 07/27/23 0500 07/27/23 0600 07/27/23 0700   BP:       BP Location:       Patient Position:       Pulse: 71 72 65 72   Resp: (!) 22 14 13 19   Temp: 36.5 °C (97.7 °F)      TempSrc:        SpO2: 98% 95% 97% 96%   Weight:         BMI  Body mass index is 26 kg/m².    Vent Settings (last 24 hours)     ABG Results    No lab values to display.         Intake/Output Summary (Last 24 hours) at 7/27/2023 0911  Last data filed at 7/27/2023 0700  Gross per 24 hour   Intake 3512 ml   Output 1955 ml   Net 1557 ml       CBC Results       07/27/23 07/26/23 07/21/23     0401 1456 0959    WBC 4.15 6.22 4.36    RBC 3.06 3.48 4.27    HGB 9.7 11.0 13.1    HCT 29.1 33.0 40.0    MCV 95.1 94.8 93.7    MCH 31.7 31.6 30.7    MCHC 33.3 33.3 32.8     181 231        BMP Results       07/27/23 07/21/23 05/23/23     0401 0959 0425     137 131    K 3.6 3.9 3.9    Cl 111 103 100    CO2 24 27 25    Glucose 99 86 100    BUN 11 14 27    Creatinine 0.5 0.6 0.6    Calcium 8.4 9.5 9.0    Anion Gap 4 7 6    EGFR >60.0 >60.0 >60.0        Lab Results   Component Value Date    PTT 31 07/21/2023     Lab Results   Component Value Date    INR 0.9 07/21/2023    INR 0.9 06/08/2023     Physical Exam:  Con: NAD  MS: AAOx3, answering complex questions, following commands  CN: PERRL, EOMI, no facial asymmetry, tongue midline  Motor: 5/5 throughout  Sensory: light touch intact in all four extremities  Gait: Deferred secondary to fall risk   EYES:  as above, anicteric, no conjunctival pallor  MSK: no edema  Derm: no rashes or breakdown  Right radial site: clean, dry and intact with no hematoma. Ecchymosis.     Imaging:  Neuro interventional Procedure 7/26/2023  DETAILS OF PROCEDURE:  The patient was appropriately positioned and identified by all staff on the INR table and an Neeraj's test was performed demonstrating good collateral circulation. The right wrist was prepped and draped in a sterile fashion and a time-out was performed. The site was anesthetized with 2% lidocaine, and the right radial artery was catheterized under sterile ultrasound guidance with a 7-[place] sheath using the Seldinger technique. A cocktail of heparin,  nicardipene, and nitroglycerin was injected for vasodilatation and a run was performed to assess the radial artery. A still shot was sent to PACS. An 038 terumo guidewire was inserted through the sheath and advanced into the brachial artery. The sheath was then removed and exchanged for a 6-Turkish Ballast catheter, which was advanced over the wire using a dilator. The dilator was removed and a sanchez-2 catheter was advanced over the guidewire into the aortic arch  Selectively, the right vertebral artery was catheterized.  At that point, the Ballast was positioned in the V3 segment of the right vertebral artery. The patient then received 5000 units of heparin for prevention of thromboembolic events. Then a cerebral angiogram was performed.  Multiple runs in multiple views with three dimensional reconstruction at a separate workstation, again noted the cerebral aneurysm in the basilar tip artery region, measuring 4.5 mm x 5.0 mm x 4.7 mm.  Subsequent with a Via 17 microcatheter, Synchro II micro guidewire, the aneurysm was micro catheterized, based on optimal views from the 3D reconstruction.  Then the micro guidewire was removed and a WEB-IT measuring 6 mm  x 3 mm was carefully deployed inside the aneurysm.  Following successful positioning of the device, a cerebral angiogram was performed of the right vertebral artery. Multiple runs in multiple views, showed excellent positioning of the device, significant stasis within the aneurysm, which carried out into the late venous phase, and no evidence of vascular cutoff. Subsequently, the device was detached and the micro catheter was removed, and a final cerebral angiogram was performed of the right vertebral artery. Multiple runs in multiple views, showed excellent positioning of the device, significant stasis within the aneurysm, which carried out into the late venous phase, and no evidence of vascular cutoff. Mean transient time was normal.  The right artery was  catheterized.  There was normal filling of all segments. The sheath was then removed with placement of vasc band device.  The patient tolerated the procedure very well.  The neurophysiological monitoring was stable and normal throughout the procedure. The patient was extubated and transferred at his neurological baseline to the NICU for recovery.  I was present for all key and critical portions of the procedure.    All images and laboratory findings performed at the time of the note were reviewed and interpreted.     * Cerebral aneurysm, nonruptured  Assessment & Plan  In summary, Nelly Meyer is a 82 y.o. female with a PMHx pf HTN, HLD and osteoporosis. 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   DSA on 6/15/2023 confirmed a basilar tip aneurysm measuring nearly 6 mm in its greatest dimension with a celestin's excresence along the dome. She is now S/P diagnostic cerberal angiogram with WALLER embolization POD1.   -Admit to ICU  -Q1 hour neuro checks  --140  -Ok to D/C baron and Jovanna  -Continue Brillinta BID and ASA 81mg daily  -Cardiology consult: PVCs noted on monitor  -Likely plan for discharge home tomorrow if patient remains stable  -Mobilize OOB to chair   -Follow up with Dr. Rain in 4 weeks no repeat imagining needed          Kymberly Nielson NP  07/27/23

## 2023-07-27 NOTE — CONSULTS
REASON FOR CONSULT: chungy     CONSULT FROM: Ramiro Rain MD    PRIMARY CARDIOLOGIST: Robin Miguel M.D.    ------------------------------------------------------------------------------------------------------------------------------------------  HISTORY OF PRESENTING ILLNESS  ------------------------------------------------------------------------------------------------------------------------------------------  Nelly Meyer is a 82 y.o. female who is admitted for a planned procedure, but became hypotensive overnight     # cardiac risk factors: HTN, hyperlipidemia  # CAD-calcium score is 407 - 1/2017  # No CHF  # PMH: anemia, syncopal episode in 8/2019-nml cardiac work up, cerebral aneurysm, GERD, basal cell carcinoma, osteoporosis.     Echo 11/10/2021: Normal LV with preserved systolic function.  LVEF 65%.  Mild LV hypertrophy, grade 1 diastolic dysfunction.  LA mildly dilated.  MV mild regurgitation.  TV mild regurgitation     NM Stress Test 6/11/2020: Low risk for ischemia, EF 79%    At baseline, the patient denies exertional chest pain, shortness of breath, orthopnea, PND, ankle edema, palpitations, or syncope.     Mrs. 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 The cardiac cause of the fall was ruled out on last admission.    The patient presents to [facility] for a planned diagnostic cerebral angiogram and embolization of basilar tip aneurysm. She underwent WEB embo for basilar tip aneurysm on 7/26/2023, after which was tranferred to ICU post-op.  The patient became hypotensive with systolic blood pressure 78/45 responding  to IVF.  Weak and lightheaded, hemoglobin dropped from 13.1>>11.0>> 9.7 this morning no obvious sources of bleeding identified.  Blood pressure today is stable.  Cardiology consulted for ольгаinlizet noted on telemetry   ------------------------------------------------------------------------------------------------------------------------------------------  PAST MEDICAL HISTORY  ------------------------------------------------------------------------------------------------------------------------------------------  Past Medical History:   Diagnosis Date   • Anemia     iron def.   • Basal cell carcinoma     nose , Moh's surgery   • Constipation    • Gallstones    • GERD (gastroesophageal reflux disease)    • Hiatal hernia    • Hypertension    • Kidney stone     found by ultrasound, no problem presently   • Lipid disorder    • Osteoporosis    • Postmenopausal      Past Surgical History:   Procedure Laterality Date   • BREAST SURGERY      right breast segmental resection   • COLONOSCOPY     • ESOPHAGOGASTRODUODENOSCOPY     • MOHS SURGERY      nose   • ORIF ANKLE FRACTURE BIMALLEOLAR Left    • TEAR DUCT SURGERY Right     tear duct bypass       ------------------------------------------------------------------------------------------------------------------------------------------  MEDICATIONS  ------------------------------------------------------------------------------------------------------------------------------------------  Home medications    •  acetaminophen, Take 1 tablet (500 mg total) by mouth every 6 (six) hours as needed for pain.  •  amLODIPine, Take 10 mg by mouth nightly.  •  aspirin, Take 81 mg by mouth nightly.  •  atorvastatin, Take 40 mg by mouth nightly.  •  cholecalciferol (vitamin D3), Take 1,000 Units by mouth nightly.  •  latanoprost, Administer 1 drop into both eyes nightly.  •  lisinopriL, Take 1 tablet (20 mg total) by mouth nightly.  •  ticagrelor, Take 90 mg by mouth 2 (two) times a day.  •   vit A/vit C/biotin/zinc/copper (HAIR-SKIN-NAIL,VIT A,C-BIOTIN, ORAL), Take 1 tablet by mouth daily.  •  lidocaine, Apply 1 patch topically daily as needed (back pain). (Patient not taking: Reported on 7/5/2023)    Inpatient Medications    •  acetaminophen, 650 mg, oral, q4h PRN  •  amLODIPine, 10 mg, oral, Nightly  •  aspirin, 81 mg, oral, Daily  •  atorvastatin, 40 mg, oral, Nightly  •  cholecalciferol (vitamin D3), 1,000 Units, oral, Daily  •  glucose, 16-32 g of dextrose, oral, PRN **OR** dextrose, 15-30 g of dextrose, oral, PRN **OR** glucagon, 1 mg, intramuscular, PRN **OR** dextrose 50 % in water (D50), 25 mL, intravenous, PRN  •  fentaNYL, 50 mcg, intravenous, q5 min PRN  •  labetalol, 10 mg, intravenous, q15 min PRN **OR** hydrALAZINE, 10 mg, intravenous, q15 min PRN  •  latanoprost, 1 drop, Both Eyes, Nightly  •  lisinopriL, 20 mg, oral, Nightly  •  magnesium sulfate, 1 g, intravenous, PRN **OR** magnesium sulfate, 2 g, intravenous, PRN  •  niCARdipine, 2.5-15 mg/hr, intravenous, Titrated  •  norepinephrine, 0.5-90 mcg/min, intravenous, Titrated  •  ondansetron, 4 mg, intravenous, q15 min PRN  •  ondansetron, 4 mg, intravenous, q6h PRN  •  potassium chloride, 20 mEq, oral, PRN  •  potassium chloride, 40 mEq, oral, PRN  •  potassium chloride, 20 mEq, intravenous, PRN  •  potassium chloride in water, 20 mEq, intravenous, PRN **AND** potassium chloride in water, 20 mEq, intravenous, PRN  •  povidone-iodine, 1 Application, Each Nostril, 60 min Pre-Op  •  sodium chloride 0.9 %, , intravenous, Continuous  •  ticagrelor, 90 mg, oral, BID    ------------------------------------------------------------------------------------------------------------------------------------------  ALLERGIES  ------------------------------------------------------------------------------------------------------------------------------------------  Patient has no known  allergies.    ------------------------------------------------------------------------------------------------------------------------------------------  SOCIAL HISTORY  ------------------------------------------------------------------------------------------------------------------------------------------  Former smoker  She smoked, quitting on 1990.   social alcohol    ------------------------------------------------------------------------------------------------------------------------------------------  FAMILY HISTORY  ------------------------------------------------------------------------------------------------------------------------------------------  No premature CAD    ------------------------------------------------------------------------------------------------------------------------------------------  REVIEW OF SYSTEMS  ------------------------------------------------------------------------------------------------------------------------------------------  Constitutional: - fever, - chills, - weakness, - weight loss  HEENT: - blurred vision, - sore throat, - hoarseness  Respiratory: - dyspnea, - cough, - hemoptysis  Cardiovascular: - chest pain, - dyspnea, - orthopnea, - PND, - edema, - palpitations, - syncope  Gastrointestinal: - nausea, - vomiting, - diarrhea, - hematemesis, - melena  Genitourinary: - dysuria, - frequency  Integument: - rash, - itching  Hematologic/lymphatic:  - bruising, - petechiae  Musculoskeletal: - arthalgias, - myalgias  Neurological: - vertigo, - tremors, - headache, - speech deficit, - focal weakness  Behavioral/Psych: - anxiety, - depression  Endocrine: - cold intolerance, - heat intolerance, - weight change    ------------------------------------------------------------------------------------------------------------------------------------------  PHYSICAL  EXAM  ------------------------------------------------------------------------------------------------------------------------------------------  VITAL SIGNS:  Temp:  [35.8 °C (96.4 °F)-37.1 °C (98.8 °F)] 36.5 °C (97.7 °F)  Heart Rate:  [65-84] 84  Resp:  [7-35] 33  BP: ()/(45-91) 113/58  SaO2: 97%    Intake/Output Summary (Last 24 hours) at 7/27/2023 0951  Last data filed at 7/27/2023 0912  Gross per 24 hour   Intake 3838 ml   Output 1955 ml   Net 1883 ml       PHYSICAL EXAM:  General appearance:  female,apperas younger than stated age, in no acute distress, nontoxic-appearing, alert and cooperative, no family members present at bedside  Head: without obvious abnormality  Eyes: PERRLA, extraocular movements intact  Neck: No JVD, carotid bruits, thyromegaly  Lungs: clear to auscultation bilaterally, no crackles or wheezing  Heart: regular rate and rhythm, S1-S2 normal, murmurs 3/6 noted, no clicks, rubs or gallops  Abdomen: soft, non-tender, bowel sounds normal  Extremities: no edema, peripheral pulses present  Skin: Skin color, texture, turgor normal. No rashes or lesions  Neurologic: Alert and oriented X 3, no focal deficits    ------------------------------------------------------------------------------------------------------------------------------------------  LABS / IMAGING / EKG / TELEMETRY  ------------------------------------------------------------------------------------------------------------------------------------------  LABS:  Results from last 7 days   Lab Units 07/27/23  0401 07/21/23  0959   SODIUM mEQ/L 139 137   POTASSIUM mEQ/L 3.6 3.9   MAGNESIUM mg/dL 1.7*  --    CHLORIDE mEQ/L 111* 103   CO2 mEQ/L 24 27   BUN mg/dL 11 14   CREATININE mg/dL 0.5* 0.6     Results from last 7 days   Lab Units 07/27/23  0401 07/26/23  1456 07/21/23  0959   WBC K/uL 4.15 6.22 4.36   HEMOGLOBIN g/dL 9.7* 11.0* 13.1   HEMATOCRIT % 29.1* 33.0* 40.0   PLATELETS K/uL 166 181 231   INR   --   --  0.9      No results found for: HGBA1C, TSH  No results found for: CHOL, LDLCALC, HDL, TRIG  No results found for: BNP    IMAGING: none this admission   ECG  None this admission   TELEMETRY: SR 65 with PVCs, multiple events of bigeminy since 3 am       ------------------------------------------------------------------------------------------------------------------------------------------  ASSESSMENT AND PLAN  ------------------------------------------------------------------------------------------------------------------------------------------    # Bigeminy  Post op, no prior Hx according to the office note.   She was noted to have asymptomatic bigeminy on Telemetry and is still in bigeminy during my interview   No further interventions/studies or work up at this time.   Close outpatient follow up with cardiology advised.   Keep K.4 and Mg >2 mg/dL    # Mixed hyperlipidemia  She has a history of elevated coronary calcium score, 407. Continue atorvastatin 40 mg daily. Recent LDL 72.  Lifestyle modifications are recommended including reduced carbohydrate diet and increased exercise to help obtain goal LDL less than 70    # Hypertensive heart disease without CHF  Blood pressure is well controlled on lisinopril 20 mg daily and amlodipine 10 mg daily. If continues having LLE swelling, can decrease or dc amlodipine, and go up on lisinopril dose.   Continue monitoring BP closely  Continue with home doses of amlodipine and lisinopril fro SBP above 110.     # Coronary artery disease without angina  CAC of 407, Continue aspirin and statin.      TAMIE Lopez  7/27/2023    Primary Care Doctor: Shahab Palacio MD

## 2023-07-27 NOTE — PROGRESS NOTES
Critical Care Progress Note     SUBJECTIVE  Interval History:   Right radial band removed overnight without any complications  Neuro exam stable  Afebrile overnight    I/O since admission: +1.4L  Urine 1.9L    Allergies: Patient has no known allergies.    CURRENT MEDS  •  acetaminophen, 650 mg, oral, q4h PRN  •  amLODIPine, 10 mg, oral, Nightly  •  aspirin, 81 mg, oral, Daily  •  atorvastatin, 40 mg, oral, Nightly  •  cholecalciferol (vitamin D3), 1,000 Units, oral, Daily  •  glucose, 16-32 g of dextrose, oral, PRN **OR** dextrose, 15-30 g of dextrose, oral, PRN **OR** glucagon, 1 mg, intramuscular, PRN **OR** dextrose 50 % in water (D50), 25 mL, intravenous, PRN  •  fentaNYL, 50 mcg, intravenous, q5 min PRN  •  labetalol, 10 mg, intravenous, q15 min PRN **OR** hydrALAZINE, 10 mg, intravenous, q15 min PRN  •  latanoprost, 1 drop, Both Eyes, Nightly  •  lisinopriL, 20 mg, oral, Nightly  •  magnesium sulfate, 1 g, intravenous, PRN **OR** magnesium sulfate, 2 g, intravenous, PRN  •  niCARdipine, 2.5-15 mg/hr, intravenous, Titrated  •  norepinephrine, 0.5-90 mcg/min, intravenous, Titrated  •  ondansetron, 4 mg, intravenous, q15 min PRN  •  ondansetron, 4 mg, intravenous, q6h PRN  •  potassium chloride, 20 mEq, oral, PRN  •  potassium chloride, 40 mEq, oral, PRN  •  potassium chloride, 20 mEq, intravenous, PRN  •  potassium chloride in water, 20 mEq, intravenous, PRN **AND** potassium chloride in water, 20 mEq, intravenous, PRN  •  povidone-iodine, 1 Application, Each Nostril, 60 min Pre-Op  •  sodium chloride 0.9 %, , intravenous, Continuous  •  ticagrelor, 90 mg, oral, BID    VITAL SIGNS  Vitals:    07/27/23 0400 07/27/23 0500 07/27/23 0600 07/27/23 0700   BP:       BP Location:       Patient Position:       Pulse: 71 72 65 72   Resp: (!) 22 14 13 19   Temp: 36.5 °C (97.7 °F)      TempSrc:       SpO2: 98% 95% 97% 96%   Weight:           VENTILATOR SETTINGS       Oxygen:  Oxygen Therapy: None (Room air)               INTAKE/OUTPUT    Intake/Output Summary (Last 24 hours) at 7/27/2023 0855  Last data filed at 7/27/2023 0700  Gross per 24 hour   Intake 3512 ml   Output 1955 ml   Net 1557 ml       Lines, Drains, Airways, Wounds:  Peripheral IV (Adult) 07/26/23 Right Antecubital (Active)   Number of days: 1       Peripheral IV (Adult) 07/26/23 Left Antecubital (Active)   Number of days: 1       Arterial Line 07/26/23 Left Radial (Active)   Number of days: 1       Catheterization Site - Arterial Right Radial 5 Fr. (Active)   Number of days: 1       PHYSICAL EXAM  Physical Exam  Vitals and nursing note reviewed.   Constitutional:       General: She is not in acute distress.  HENT:      Head: Normocephalic and atraumatic.   Eyes:      Extraocular Movements: Extraocular movements intact.      Pupils: Pupils are equal, round, and reactive to light.   Cardiovascular:      Rate and Rhythm: Normal rate and regular rhythm.      Pulses: Normal pulses.      Heart sounds: Normal heart sounds. No murmur heard.     No friction rub.   Pulmonary:      Effort: Pulmonary effort is normal. No respiratory distress.      Breath sounds: Normal breath sounds. No stridor. No wheezing or rhonchi.   Abdominal:      General: Bowel sounds are normal. There is no distension.      Palpations: Abdomen is soft.   Skin:     General: Skin is warm and dry.      Capillary Refill: Capillary refill takes less than 2 seconds.   Neurological:      General: No focal deficit present.      Mental Status: She is alert and oriented to person, place, and time. Mental status is at baseline.      Cranial Nerves: No cranial nerve deficit.      Sensory: No sensory deficit.      Motor: No weakness.      Coordination: Coordination normal.         LAB RESULTS  ABG    CBC  Results from last 7 days   Lab Units 07/27/23  0401 07/26/23  1456 07/21/23  0959   WBC K/uL 4.15 6.22 4.36   RBC M/uL 3.06* 3.48* 4.27   HEMOGLOBIN g/dL 9.7* 11.0* 13.1   HEMATOCRIT % 29.1* 33.0* 40.0   MCV fL  95.1 94.8 93.7   MCH pg 31.7 31.6 30.7   MCHC g/dL 33.3 33.3 32.8   PLATELETS K/uL 166 181 231   RDW % 12.4 12.2 12.6   MPV fL 9.5 8.9* 9.8     BMP  Results from last 7 days   Lab Units 07/27/23  0401 07/21/23  0959   SODIUM mEQ/L 139 137   POTASSIUM mEQ/L 3.6 3.9   CHLORIDE mEQ/L 111* 103   CO2 mEQ/L 24 27   BUN mg/dL 11 14   CREATININE mg/dL 0.5* 0.6   CALCIUM mg/dL 8.4* 9.5   GLUCOSE mg/dL 99 86     Coag  Results from last 7 days   Lab Units 07/21/23  0959   PROTIME sec 12.2   INR  0.9   PTT sec 31     Micro  Microbiology Results     Procedure Component Value Units Date/Time    MRSA Screen, Nares Only Nose [[id number]]  (Normal) Collected: 07/26/23 1250    Specimen: Nasal Swab from Nose Updated: 07/26/23 1900     MRSA DNA, Nares Negative          IMAGING  Neurointerventional procedure    Result Date: 7/26/2023  RECOMMENDATIONS:  1.  -140 2.  R radial band x2 hours 3.  Continue Asa 81 mg daily and Brilinta 90 mg bid       ECG/Telemetry  I have independently reviewed the telemetry. No events for the last 24 hours.    ASSESSMENT & PLAN  83 y/o F with an incidentally discovered 7 mm basilar tip aneurysm which was revealed on a CTH and subsequent CTA after she sustained a mechanical fall in May 2023. She presented on 7/26 for an elective WEB embolization by Dr. Rain    1. Basilar tip aneurysm   -s/p WEB embo by Dr. Rain on 7/26. POD #1  -continue q1 hour  Neuro checks  -SBP goal 100-140  -continue ASA 81 mg and brilinta 90 mg BID  -completed right radial band precautions overnight. Access site without hematoma/bleeding  -advance diet as tolerated  -activity as tolerated  -d/c baron today, trial of voiding  -d/c arterial line  -Neuro IR following. Plan for discharge home tomorrow     2.Hypertension  -SBP goals 100-140 post procedure  -continue home norvasc and lisinoprol     3. Hyperlipidemia  -continue lipitor        4. Anemia  -post-procedure 9.7 down from 13.1 on 7/21  -no obvious signs of bleeding.  Right radial access site without bleeding or hematoma.   -continue to trend    5. Bigeminy  -noted on monitor overnight  -asymptomatic   -cardiology consulted  -Keep K >4, Mag >2      Lines/Drains/Tubes: PIV x2  Fluids/Electrolytes/Nutrition: regular diet  DVT ppx: SCD's  GI ppx: not indicated      CODE STATUS  Full Code    The case was reviewed this morning at the patient's beside multidisciplinary rounds with the patient's nurse, dietician, pharmacist, respiratory therapist, physical therapist and critical care nurse coordinator. The patient's clinical status with regards to diagnosis, treatment plans including disposition of any IV, arterial lines, Lopez and tubes were discussed, as well as dietary, respiratory therapy and mobilization needs.     This case was discussed with consultants.      This note was scribed by MARIA TERESA Hatch in my presence on my behalf.    Zaki Calle,   7/27/2023

## 2023-07-27 NOTE — ASSESSMENT & PLAN NOTE
In summary, Nelly Meyer is a 82 y.o. female with a PMHx pf HTN, HLD and osteoporosis. 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   DSA on 6/15/2023 confirmed a basilar tip aneurysm measuring nearly 6 mm in its greatest dimension with a celestin's excresence along the dome. She is now S/P diagnostic cerberal angiogram with WALLER embolization POD2.   -Admit to ICU  -Q1 hour neuro checks  --140  -Continue Brillinta BID and ASA 81mg daily  -Cardiology following  -Ok for discharge home this afternoon  -Follow up with Dr. Rain in 4 weeks no repeat imagining needed

## 2023-07-27 NOTE — CONSULTS
Brief visit with MsUmu Meyer while rounding on ICU.  Offered supportive conversation.  She is looking forward to going home.    Steph Garcia  Spiritual Care Specialist  #[phone]

## 2023-07-27 NOTE — PLAN OF CARE
Care Coordination Discharge Plan Note     Discharge Needs Assessment  Concerns to be Addressed: adjustment to diagnosis/illness, care coordination/care conferences, discharge planning  Current Discharge Risk: lives alone    Anticipated Discharge Plan  Anticipated Discharge Disposition: home with home health  Type of Home Care Services: home OT, home PT, nursing        Patient Choice  Offered/Gave Vendor List: yes  Patient's Choice of Community Agency(s): discussed d/c planning options with paitetn such as home care but await pt/ot evals    Patient and/or patient guardian/advocate was made aware of their right to choose a provider. A list of eligible providers was presented and reviewed with the patient and/or patient guardian/advocate in written and/or verbal form. The list delineates providers in the patient’s desired geographic area who are participating in the Medicare program and/or providers contracted with the patient’s primary insurance. The Medicare list and quality ratings were obtained from the Medicare.gov [medicare.gov] website.    ---------------------------------------------------------------------------------------------------------------------    Interdisciplinary Discharge Plan Review:  Participants:     Concerns Comments: patient lives alone & will need pt/ot eval    Discharge Plan:   Disposition/Destination:   /    Discharge Facility:    Community Resources:      Discharge Transportation:  Is Out of Hospital DNR needed at Discharge: no  Does patient need discharge transport?           Patient is walking around unit, coty met with patient to discuss d/c plans. Sw offered home care to patient who declines need for home care. Coty asked for brilintia rx to be sent to patient's pharmacy so sw can price out brilinta for patient. Awaiting rx to be sent if paitent will e d/c'd on brilinta.   Sw spoke to patient's pharmacy & patient has copay for $47.00 for brilinta . Coty provided patient with coupon &  provided the cost fro medication. Patient provided free month card.

## 2023-07-28 VITALS
RESPIRATION RATE: 17 BRPM | OXYGEN SATURATION: 96 % | HEIGHT: 67 IN | SYSTOLIC BLOOD PRESSURE: 118 MMHG | DIASTOLIC BLOOD PRESSURE: 59 MMHG | BODY MASS INDEX: 26.06 KG/M2 | HEART RATE: 69 BPM | WEIGHT: 166 LBS | TEMPERATURE: 98 F

## 2023-07-28 LAB
ANION GAP SERPL CALC-SCNC: 7 MEQ/L (ref 3–15)
BASOPHILS # BLD: 0.03 K/UL (ref 0.01–0.1)
BASOPHILS NFR BLD: 0.7 %
BUN SERPL-MCNC: 12 MG/DL (ref 7–25)
CALCIUM SERPL-MCNC: 9.1 MG/DL (ref 8.6–10.3)
CHLORIDE SERPL-SCNC: 109 MEQ/L (ref 98–107)
CO2 SERPL-SCNC: 24 MEQ/L (ref 21–31)
CREAT SERPL-MCNC: 0.5 MG/DL (ref 0.6–1.2)
DIFFERENTIAL METHOD BLD: ABNORMAL
EOSINOPHIL # BLD: 0.23 K/UL (ref 0.04–0.36)
EOSINOPHIL NFR BLD: 5.6 %
ERYTHROCYTE [DISTWIDTH] IN BLOOD BY AUTOMATED COUNT: 12.3 % (ref 11.7–14.4)
GFR SERPL CREATININE-BSD FRML MDRD: >60 ML/MIN/1.73M*2
GLUCOSE SERPL-MCNC: 97 MG/DL (ref 70–99)
HCT VFR BLDCO AUTO: 33.4 % (ref 35–45)
HGB BLD-MCNC: 10.9 G/DL (ref 11.8–15.7)
IMM GRANULOCYTES # BLD AUTO: 0.01 K/UL (ref 0–0.08)
IMM GRANULOCYTES NFR BLD AUTO: 0.2 %
LYMPHOCYTES # BLD: 0.89 K/UL (ref 1.2–3.5)
LYMPHOCYTES NFR BLD: 21.8 %
MAGNESIUM SERPL-MCNC: 1.9 MG/DL (ref 1.8–2.5)
MCH RBC QN AUTO: 31.3 PG (ref 28–33.2)
MCHC RBC AUTO-ENTMCNC: 32.6 G/DL (ref 32.2–35.5)
MCV RBC AUTO: 96 FL (ref 83–98)
MONOCYTES # BLD: 0.42 K/UL (ref 0.28–0.8)
MONOCYTES NFR BLD: 10.3 %
NEUTROPHILS # BLD: 2.5 K/UL (ref 1.7–7)
NEUTS SEG NFR BLD: 61.4 %
NRBC BLD-RTO: 0 %
PDW BLD AUTO: 9.3 FL (ref 9.4–12.3)
PLATELET # BLD AUTO: 180 K/UL (ref 150–369)
POTASSIUM SERPL-SCNC: 3.7 MEQ/L (ref 3.5–5.1)
RBC # BLD AUTO: 3.48 M/UL (ref 3.93–5.22)
SODIUM SERPL-SCNC: 140 MEQ/L (ref 136–145)
WBC # BLD AUTO: 4.08 K/UL (ref 3.8–10.5)

## 2023-07-28 PROCEDURE — 83735 ASSAY OF MAGNESIUM: CPT | Performed by: STUDENT IN AN ORGANIZED HEALTH CARE EDUCATION/TRAINING PROGRAM

## 2023-07-28 PROCEDURE — 36415 COLL VENOUS BLD VENIPUNCTURE: CPT | Performed by: NURSE PRACTITIONER

## 2023-07-28 PROCEDURE — 63700000 HC SELF-ADMINISTRABLE DRUG: Performed by: NURSE PRACTITIONER

## 2023-07-28 PROCEDURE — 63600000 HC DRUGS/DETAIL CODE: Mod: JZ | Performed by: STUDENT IN AN ORGANIZED HEALTH CARE EDUCATION/TRAINING PROGRAM

## 2023-07-28 PROCEDURE — 80048 BASIC METABOLIC PNL TOTAL CA: CPT | Performed by: NURSE PRACTITIONER

## 2023-07-28 PROCEDURE — 99233 SBSQ HOSP IP/OBS HIGH 50: CPT | Performed by: STUDENT IN AN ORGANIZED HEALTH CARE EDUCATION/TRAINING PROGRAM

## 2023-07-28 PROCEDURE — 63700000 HC SELF-ADMINISTRABLE DRUG: Performed by: STUDENT IN AN ORGANIZED HEALTH CARE EDUCATION/TRAINING PROGRAM

## 2023-07-28 PROCEDURE — 85025 COMPLETE CBC W/AUTO DIFF WBC: CPT | Performed by: NURSE PRACTITIONER

## 2023-07-28 RX ADMIN — TICAGRELOR 90 MG: 90 TABLET ORAL at 08:00

## 2023-07-28 RX ADMIN — MAGNESIUM SULFATE IN DEXTROSE 1 G: 10 INJECTION, SOLUTION INTRAVENOUS at 06:36

## 2023-07-28 RX ADMIN — POTASSIUM CHLORIDE 20 MEQ: 750 TABLET, EXTENDED RELEASE ORAL at 06:31

## 2023-07-28 RX ADMIN — ASPIRIN 81 MG: 81 TABLET, COATED ORAL at 07:59

## 2023-07-28 RX ADMIN — Medication 1000 UNITS: at 08:00

## 2023-07-28 ASSESSMENT — COGNITIVE AND FUNCTIONAL STATUS - GENERAL
CLIMB 3 TO 5 STEPS WITH RAILING: 4 - NONE
STANDING UP FROM CHAIR USING ARMS: 4 - NONE
WALKING IN HOSPITAL ROOM: 4 - NONE
MOVING TO AND FROM BED TO CHAIR: 4 - NONE

## 2023-07-28 NOTE — DISCHARGE SUMMARY
Neurosurgery Inpatient Discharge Summary    BRIEF OVERVIEW  Admitting Provider:      Attending Provider: Ramiro Rain MD Attending phys phone: [phone]  Primary Care Physician at Discharge: Shahab Palacio [phone]    Admission Date: 7/26/2023     Discharge Date: 7/28/2023    Primary Discharge Diagnosis  Cerebral aneurysm, nonruptured    Secondary Discharge Diagnosis  Active Hospital Problems    Diagnosis Date Noted   • Cerebral aneurysm, nonruptured 07/07/2023      Resolved Hospital Problems   No resolved problems to display.       DETAILS OF HOSPITAL STAY    Operative Procedures Performed  Procedure(s):  Diagnostic cerebral angiogram with possible web device    Consults:   Consult Notes 6/28/2023 to 7/28/2023         Date of Service Author Author Type Status Note Type File Time    07/27/23 1430 Jessica Garcia Signed Consults 07/27/23 1441    07/27/23 0951 Ambreen Fan PA C Physician Assistant Attested Consults 07/27/23 1351    07/26/23 1358 Zaki Calle DO Physician Signed Consults 07/26/23 1956    07/26/23 1350 Jessica Garcia Signed Consults 07/26/23 1504          Consult Orders During Admission:  IP CONSULT TO CARDIOLOGY     Procedures: Diagnostic cerebral angiogram with WALLER embolization on 7/26/2023  Pertinent Test Results:     Imaging:  No new imaging       Presenting Problem/History of Present Illness  Cerebral aneurysm, nonruptured [I67.1]       Hospital Course  Nelly Meyer is a 82 y.o. female with a PMHx pf HTN, HLD and osteoporosis. 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   DSA on 6/15/2023 confirmed a basilar tip aneurysm measuring nearly 6 mm in its greatest dimension with a celestin's excresence along the dome. She was started on ASA and Plavix daily. P2Y12 was 193 and the patient d/c Plavix and was started on  Brillinta on Monday 7/24/2023. She is now S/P diagnostic cerberal angiogram with WALLER embolization on 7/26/2023 by Dr. Rain. Post procedure she was admitted to the ICU for close monitoring. The patient became spontaneously hypotensive with SBPs in the 70s/80s. She was awake and alert during the event and denies headache. She admitted to dizziness and lightheadedness and HGB decreased from 13.1 to 11.0 to 9.0 She received a NSS bolus and maintained on gtt which resolved the symptoms and her blood pressures were with in normal range.The Vasc band was unable to be removed after 2 hours due to increased bleeding at the right radial site. The band was re inflated and continued until 0300 on 7/27/2023 when it was able to be successfully deflated and removed without bleeding.     She was noted to have bigeminy on the telemetry monitor. Cardiology was consulted who recommended no further interventions or studies at this time but to continue with close outpatient follow up. She continued with normal blood pressures and appears comfortable this morning upon examination. She ambulated without difficulty. She will continue with ASA daily and Brillinta 90mg BID. She is ready for discharge home.        Discharge Orders  Released Discharge Orders     Order Details Provider Status    acetaminophen (TYLENOL) 500 mg tablet Take 1 tablet (500 mg total) by mouth every 6 (six) hours as needed for pain. Kymberly Nielson NP Resume at Discharge (Prescription)    amLODIPine (NORVASC) 5 mg tablet Take 10 mg by mouth nightly. Kymberly Nielson NP Resume at Discharge (Patient Reported)    aspirin 81 mg enteric coated tablet Take 81 mg by mouth nightly. Kymberly Nielson NP Resume at Discharge (Patient Reported)    atorvastatin (LIPITOR) 40 mg tablet Take 40 mg by mouth nightly. Kymberly Nielson NP Resume at Discharge (Patient Reported)    cholecalciferol, vitamin D3, (VITAMIN D3) 25 mcg (1,000 unit) capsule Take 1,000 Units by mouth  nightly. Kymberly Nielson NP Resume at Discharge (Patient Reported)    latanoprost 0.005 % drops, emulsion Administer 1 drop into both eyes nightly. Kymberly Nielson NP Resume at Discharge (Patient Reported)    lisinopriL (PRINIVIL) 20 mg tablet Take 1 tablet (20 mg total) by mouth nightly. Kymberly Nielson NP Resume at Discharge (Prescription)    ticagrelor (BRILINTA) 90 mg tablet Take 90 mg by mouth 2 (two) times a day. Kymberly Nielson NP Resume at Discharge (Patient Reported)    vit A/vit C/biotin/zinc/copper (HAIR-SKIN-NAIL,VIT A,C-BIOTIN, ORAL) Take 1 tablet by mouth daily. Kymberly Nielson NP Resume at Discharge (Patient Reported)    acetaminophen (TYLENOL) tablet 650 mg 650 mg, oral, Every 4 hours PRN, pain, Starting on Wed 7/26/23 at 1157, Recovery (CV) and Floor Kymberly Nielson NP Do Not Order at Discharge    amLODIPine (NORVASC) tablet 10 mg 10 mg, oral, Nightly, First dose on Wed 7/26/23 at 2200 Kymberly Nielson NP Do Not Order at Discharge    aspirin enteric coated tablet 81 mg 81 mg, oral, Daily, First dose on Thu 7/27/23 at 0900** Do not crush, chew, or qureshi **  Kymberly Nielson NP Do Not Order at Discharge    atorvastatin (LIPITOR) tablet 40 mg 40 mg, oral, Nightly, First dose on Wed 7/26/23 at 2200 Kymberly Nielson NP Do Not Order at Discharge    cholecalciferol (vitamin D3) tablet 1,000 Units 1,000 Units, oral, Daily, First dose on Wed 7/26/23 at 98062,000 units is equal to 25 mcg. Kymberly Nielson NP Do Not Order at Discharge    dextrose 40 % oral gel 15-30 g of dextrose 15-30 g of dextrose, oral, As needed, low blood sugar, Blood Glucose less than or equal to 70, Starting on Wed 7/26/23 at 1156, Recovery (CV) and Floor* If unable to chew but able to take PO * Hypoglycemia (Adult)  Blood Glucose 51 mg/dL - 70 mg/dL -- Administer 15 grams of simple carbohydrates (1 tube of glucose gel) Blood Glucose less than or equal to 50 mg/dL -- Administer 30 grams of simple carbohydrates (2 tubes of glucose  gel)  Repeat POC blood glucose 15 minutes after treatment. ·  Fifteen (15) minutes after any hypoglycemia treatments, repeat blood glucose with the unit meter. If the repeat blood glucose level is above 70mg/dL, repeat the blood glucose level in one hour if the patient has not had a meal. If the patient has eaten, follow the standard protocol to perform POC testing as ordered. ·  If the repeat blood glucose level is less than 70mg/dL, repeat the hypoglycemia treatment. Perform blood glucose again in 15 minutes after retreating. Repeat treatment for a third time as needed and notify physician immediately. ·  Notify the physician of the clinical situation and document all interventions. Kymberly Nielson NP Do Not Order at Discharge    dextrose 50 % in water (D50) injection 12.5 g 12.5 g (25 mL), intravenous, As needed, low blood sugar, Blood Glucose less than or equal to 70, Starting on Wed 7/26/23 at 1156, Recovery (CV) and Floor* If NPO or unable to swallow and has IV access *  Repeat POC blood glucose 15 minutes after treatment. ·  Fifteen (15) minutes after any hypoglycemia treatments, repeat blood glucose with the unit meter. If the repeat blood glucose level is above 70mg/dL, repeat the blood glucose level in one hour if the patient has not had a meal. If the patient has eaten, follow the standard protocol to perform POC testing as ordered. ·  If the repeat blood glucose level is less than 70mg/dL, repeat the hypoglycemia treatment. Perform blood glucose again in 15 minutes after retreating. Repeat treatment for a third time as needed and notify physician immediately. ·  Notify the physician of the clinical situation and document all interventions. Kymberly Nielson NP Do Not Order at Discharge    fentaNYL (PF) (SUBLIMAZE) injection 50 mcg 50 mcg, intravenous, Every 5 min PRN, pain, Starting on Wed 7/26/23 at 1237, For 2 doses, PACU (only)If pain not relieved and patient not sedated (Pasero Opioid Induced  Sedation Scale (POSS) less than 3), while respiratory rate is > 8, repeat every 5 minutes to max of 100 mcg. Kymberly Nielson NP Do Not Order at Discharge    glucagon (GLUCAGEN) injection 1 mg 1 mg, intramuscular, As needed, low blood sugar, Blood Glucose less than or equal to 70, Starting on Wed 7/26/23 at 1156, Recovery (CV) and Floor* If NPO or unable to swallow and does not have IV access *  Repeat POC blood glucose 15 minutes after treatment. ·  Fifteen (15) minutes after any hypoglycemia treatments, repeat blood glucose with the unit meter. If the repeat blood glucose level is above 70mg/dL, repeat the blood glucose level in one hour if the patient has not had a meal. If the patient has eaten, follow the standard protocol to perform POC testing as ordered. ·  If the repeat blood glucose level is less than 70mg/dL, repeat the hypoglycemia treatment. Perform blood glucose again in 15 minutes after retreating. Repeat treatment for a third time as needed and notify physician immediately. ·  Notify the physician of the clinical situation and document all interventions. Kymberly Nielson NP Do Not Order at Discharge    glucose chewable tablet 16-32 g of dextrose 16-32 g of dextrose, oral, As needed, low blood sugar, Blood Glucose less than or equal to 70, Starting on Wed 7/26/23 at 1156, Recovery (CV) and Floor* If able to take PO * Hypoglycemia (Adult)  Blood Glucose 51 mg/dL - 70 mg/dL -- Administer 16 grams of dextrose (4 tablets) Blood Glucose less than or equal to 50 mg/dL -- Administer 32 grams glucose (8 tablets) Repeat POC blood glucose 15 minutes after treatment.   ·  Fifteen (15) minutes after any hypoglycemia treatments, repeat blood glucose with the unit meter. If the repeat blood glucose level is above 70mg/dL, repeat the blood glucose level in one hour if the patient has not had a meal. If the patient has eaten, follow the standard protocol to perform POC testing as ordered. ·  If the repeat blood  glucose level is less than 70mg/dL, repeat the hypoglycemia treatment. Perform blood glucose again in 15 minutes after retreating. Repeat treatment for a third time as needed and notify physician immediately. ·  Notify the physician of the clinical situation and document all interventions. Kmyberly Nielson NP Do Not Order at Discharge    hydrALAZINE (APRESOLINE) injection 10 mg 10 mg, intravenous, Every 15 min PRN, high blood pressure, Starting on Wed 7/26/23 at 1157, For 2 doses, Recovery (CV) and FloorFor SBP > 140, and HR <= 60. Max 2 doses. Notify primary hospital care team and initiate nicardipine drip (if ordered) if remains above indicated SBP after 2 doses. Maximum IVP administration rate: 10 mg/minute Kymberly Nielson NP Do Not Order at Discharge    labetaloL (NORMODYNE,TRANDATE) injection 10 mg 10 mg, intravenous, Every 15 min PRN, high blood pressure, Starting on Wed 7/26/23 at 1157, For 2 doses, Recovery (CV) and FloorFor SBP > 140, and HR > 60. Max 2 doses. Notify primary hospital care team and initiate nicardipine drip (if ordered) if remains above indicated SBP after 2 doses. Kymberly Nielson NP Do Not Order at Discharge    latanoprost (XALATAN) 0.005 % ophthalmic solution 1 drop 1 drop, Both Eyes, Nightly, First dose on Wed 7/26/23 at 2200 Kymberly Nielson NP Do Not Order at Discharge    lidocaine (ASPERCREME) 4 % adhesive patch,medicated topical patch Apply 1 patch topically daily as needed (back pain). Kymberly Nielson NP Resume at Discharge (Prescription)    lisinopriL (PRINIVIL) tablet 20 mg 20 mg, oral, Nightly, First dose on Wed 7/26/23 at 2200 Kymberly Nielson NP Do Not Order at Discharge    magnesium sulfate IVPB 1g in 100 mL NSS/D5W/SWFI 1 g, intravenous, at 100 mL/hr, Administer over 60 Minutes, As needed, for serum magnesium of 1.9-2.0, Starting on Thu 7/27/23 at 0608Infuse over 1 hour. Kymberly Nielson NP Do Not Order at Discharge    magnesium sulfate IVPB 2g in 50 mL NSS/D5W/SWFI 2 g,  intravenous, at 25 mL/hr, Administer over 120 Minutes, As needed, for serum magnesium less than or equal to 1.8, Starting on Thu 7/27/23 at 0608Infuse over 2 hours. Kymberly Nielson NP Do Not Order at Discharge    ondansetron (ZOFRAN) injection 4 mg 4 mg, intravenous, Every 15 min PRN, nausea, Starting on Wed 7/26/23 at 1237, For 2 doses, PACU (only) Kymberly Nielson NP Do Not Order at Discharge    ondansetron (ZOFRAN) injection 4 mg 4 mg, intravenous, Every 6 hours PRN, nausea, vomiting, Starting on Wed 7/26/23 at 1158, Recovery (CV) and Floor Kymberly Nielson NP Do Not Order at Discharge    potassium chloride (KLOR-CON M) ER tablet (particles/crystals) 20 mEq 20 mEq, oral, As needed, for K 3.5 - 3.9 mEq/L, Starting on Thu 7/27/23 at 0607** This oral dosage form should NOT be crushed or pierced **  For patients unable to swallow whole, may split tablet or dissolve.   To dissolve: Place tablet in 4 oz of water and allow 2 minutes to dissolve.  Stir for 30 seconds and administer immediately.  Rinse cup with 1 oz of water and administer immediately. Kymberly Nielson NP Do Not Order at Discharge    potassium chloride (KLOR-CON M) ER tablet (particles/crystals) 40 mEq 40 mEq, oral, As needed, for K less than 3.5 mEq/L, Starting on Thu 7/27/23 at 0607** This oral dosage form should NOT be crushed or pierced **  For patients unable to swallow whole, may split tablet or dissolve.   To dissolve: Place tablet in 4 oz of water and allow 2 minutes to dissolve.  Stir for 30 seconds and administer immediately.  Rinse cup with 1 oz of water and administer immediately. Kymberly Nielson NP Do Not Order at Discharge    potassium chloride 20 mEq in 100 mL IVPB  (premix) 20 mEq, intravenous, at 50 mL/hr, Administer over 120 Minutes, As needed, for K 3.5 - 3.9 mEq/L if unable to take orally, Starting on Thu 7/27/23 at 0607** CENTRAL LINE PREFERRED ** Kymberly Nielson NP Do Not Order at Discharge    potassium chloride 20 mEq in 100 mL  IVPB  (premix) 20 mEq, intravenous, at 50 mL/hr, Administer over 120 Minutes, As needed, for K less than 3.5 mEq/L if unable to take orally, Starting on Thu 7/27/23 at 0607Bag 1 of 2 (total dose = 40 mEq) * DO NOT GIVE BOTH BAGS OF 20 MEQ IV POTASSIUM AT THE SAME TIME * Give the first 20 mEq bag over 2 hours FOLLOWED BY the second 20 mEq bag over 2 hours NOTE: the total 40 mEq dose NEEDS to be run over 4 hours ** CENTRAL LINE PREFERRED ** Kymberly Nielson NP Do Not Order at Discharge    potassium chloride 20 mEq in 100 mL IVPB  (premix) 20 mEq, intravenous, at 50 mL/hr, Administer over 120 Minutes, As needed, for K less than 3.5 mEq/L if unable to take orally, Starting on u 7/27/23 at 0607Bag 2 of 2 (total dose = 40 mEq) * DO NOT GIVE BOTH BAGS OF 20 MEQ IV POTASSIUM AT THE SAME TIME * Give the first 20 mEq bag over 2 hours FOLLOWED BY the second 20 mEq bag over 2 hours NOTE: the total 40 mEq dose NEEDS to be run over 4 hours ** CENTRAL LINE PREFERRED ** Kymberly Nielson NP Do Not Order at Discharge    ticagrelor (BRILINTA) tablet 90 mg 90 mg, oral, 2 times daily, First dose on Wed 7/26/23 at 2000 Kymberly Nielson NP Do Not Order at Discharge          Outpatient Follow-Up  Encounter Information    This patient does not currently have any appointments scheduled.       Referrals:  No orders of the defined types were placed in this encounter.      Active Issues Requiring Follow-up  Cardiology follow up  Neurovascular follow up in 2-4 weeks       Test Results Pending at Discharge  Unresulted Labs (From admission, onward)     Start     Ordered    07/27/23 0600  Basic metabolic panel  Daily      Start Status   07/29/23 0600 Scheduled   07/30/23 0600 Scheduled   07/31/23 0600 Scheduled   08/01/23 0600 Scheduled   08/02/23 0600 Scheduled       07/26/23 1303    07/27/23 0600  CBC and Differential  Daily      Start Status   07/29/23 0600 Scheduled   07/30/23 0600 Scheduled   07/31/23 0600 Scheduled   08/01/23 0600 Scheduled    08/02/23 0600 Scheduled       07/26/23 1303                    Discharge Disposition     Code Status at Discharge: Full Code

## 2023-07-28 NOTE — PROGRESS NOTES
Cardiology Progress Note       Subjective     She has no new cardiac complaints.  She does note the ecchymoses on her arms.  She denies any chest pain, shortness of breath, palpitations, dizziness.      Objective     Vital signs in last 24 hours  Temp:  [36.6 °C (97.8 °F)-36.6 °C (97.9 °F)] 36.6 °C (97.9 °F)  Heart Rate:  [61-84] 65  Resp:  [10-35] 16  BP: (105-162)/(58-80) 134/65      Intake/Output Summary (Last 24 hours) at 7/28/2023 0732  Last data filed at 7/27/2023 1300  Gross per 24 hour   Intake 526 ml   Output --   Net 526 ml         Physical Exam     Constitutional:  Well-developed and well-nourished. NAD  Head: atraumatic, normocephalic   Neck: No JVD present. Carotid bruit is not present.   Cardiovascular: Regular rate and rhythm.    1/6 systolic ejection murmur  Pulmonary/Chest: Clear to auscultation bilaterally.  Abdomen: soft, NT/ND.  Extremities: No clubbing/cyanosis/edema.   Neurological: Alert and oriented to person, place, and time. Appropriate affect  Skin: warm, dry, ecchymoses on arms right greater than left    Labs  Results from last 7 days   Lab Units 07/28/23  0451 07/27/23  0401 07/21/23  0959   SODIUM mEQ/L 140 139 137   POTASSIUM mEQ/L 3.7 3.6 3.9   MAGNESIUM mg/dL 1.9 1.7*  --    CHLORIDE mEQ/L 109* 111* 103   CO2 mEQ/L 24 24 27   BUN mg/dL 12 11 14   CREATININE mg/dL 0.5* 0.5* 0.6     Results from last 7 days   Lab Units 07/28/23  0610 07/27/23  0401 07/26/23  1456 07/21/23  0959   WBC K/uL 4.08 4.15 6.22 4.36   HEMOGLOBIN g/dL 10.9* 9.7* 11.0* 13.1   HEMATOCRIT % 33.4* 29.1* 33.0* 40.0   PLATELETS K/uL 180 166 181 231   INR   --   --   --  0.9         Current Meds  • amLODIPine  10 mg oral Nightly   • aspirin  81 mg oral Daily   • atorvastatin  40 mg oral Nightly   • cholecalciferol (vitamin D3)  1,000 Units oral Daily   • latanoprost  1 drop Both Eyes Nightly   • lisinopriL  20 mg oral Nightly   • ticagrelor  90 mg oral BID         Telemetry  Sinus rhythm occasional PVCs, occasional  ventricular bigeminy      Assessment & Plan  1.  Ventricular bigeminy: Noted postoperatively and decreasing.  She is asymptomatic.  Likely related to increased catecholamine surge from her procedure.  Continue to replace magnesium and potassium.  Hemoglobin appears to be stable.  No additional medical treatment recommended at this time.  She has known preserved left ventricular systolic function.  She has no signs, symptoms or history of coronary ischemia.    2.  Hypertensive heart disease: Blood pressure was mildly elevated overnight but it appears to be at goal at this time.  Continue antihypertensives as before.  Can increase lisinopril if necessary.  A low-salt diet is recommended.    3.  Mixed hyperlipidemia: Continue atorvastatin.  She is on a high intensity dose.  LDL was noted to be 72.    4.  Status post aneurysm treatment: She is now noted to be on aspirin and Brilinta.  Continue treatment as per neurosurgery.    The patient can follow-up with me within the next 4 to 6 weeks in the office.  We will sign off.  Please contact me with additional questions or if there are new issues that arise.      Robin Miguel MD  7/28/2023  Pager #[phone]

## 2023-07-28 NOTE — DISCHARGE INSTRUCTIONS
Neurosurgery Angiogram Discharge Instructions    Wrist Site Care:   A bruise or small lump under the skin where the catheters were is normal. These usually go away within one week.     Please check your wound site daily to make sure there is no redness, bleeding, or swelling.     The dressing should be removed the day following the procedure. A Band-Aid can be used if needed.    Showering:     You may shower 24 hours after your procedure. Clean the area with mild soap and water. Do not rub the area. Pat the area dry with a clean towel.     Do not take a bath or submerge the area under water for 3 days.     If you have bleeding where your catheter was:    Lie down and hold direct pressure for 10 minutes. If bleeding does not stop in 10 minutes, or if there is a large amount of bleeding, call 911. If bleeding does stop, rest for at least 4 hours. Call your doctor.    Activity:     Do not bend over, strain, push, pull, run or lift anything over 5-10 pounds for 7 days.     You may do light exercise in 7 days.     You may walk when you return home but try to limit the use of stairs for 14 hours if possible.     Call your doctor if you have:     Increased redness, heat, pus, bruising, or bleeding at the site after 24 hours.     Swelling at the catheter site.     Increased pain at the catheter site.     Numbness, pain or coolness in the leg.     Temperature of 101.0 degrees Fahrenheit or greater.     Chest pain or shortness of breath.      Plan:    Call Dr. Rain' office at [phone] with any questions or concerns  Follow up in the office in 4 weeks

## 2023-07-28 NOTE — PROGRESS NOTES
"Vascular NeuroSurgery Progress Note    Subjective: Patient comfortable. Sitting upright in stretcher eating breakfast.       Interval History: No new neuro issues overnight.   S/P diagnostic cerebral angiogram with WALLER embolization POD2.      Review of Systems: Negative except for what is stated above    Medications:  •  acetaminophen, 650 mg, oral, q4h PRN  •  amLODIPine, 10 mg, oral, Nightly  •  aspirin, 81 mg, oral, Daily  •  atorvastatin, 40 mg, oral, Nightly  •  cholecalciferol (vitamin D3), 1,000 Units, oral, Daily  •  glucose, 16-32 g of dextrose, oral, PRN **OR** dextrose, 15-30 g of dextrose, oral, PRN **OR** glucagon, 1 mg, intramuscular, PRN **OR** dextrose 50 % in water (D50), 25 mL, intravenous, PRN  •  fentaNYL, 50 mcg, intravenous, q5 min PRN  •  labetalol, 10 mg, intravenous, q15 min PRN **OR** hydrALAZINE, 10 mg, intravenous, q15 min PRN  •  latanoprost, 1 drop, Both Eyes, Nightly  •  lisinopriL, 20 mg, oral, Nightly  •  magnesium sulfate, 1 g, intravenous, PRN **OR** magnesium sulfate, 2 g, intravenous, PRN  •  ondansetron, 4 mg, intravenous, q15 min PRN  •  ondansetron, 4 mg, intravenous, q6h PRN  •  potassium chloride, 20 mEq, oral, PRN  •  potassium chloride, 40 mEq, oral, PRN  •  potassium chloride, 20 mEq, intravenous, PRN  •  potassium chloride in water, 20 mEq, intravenous, PRN **AND** potassium chloride in water, 20 mEq, intravenous, PRN  •  ticagrelor, 90 mg, oral, BID      Objective  Vitals:  Vitals:    07/28/23 0700 07/28/23 0750 07/28/23 0800 07/28/23 0900   BP: 134/65  (!) 166/70 (!) 153/66   Pulse: 65 77 76 70   Resp: 16  18 16   Temp:   36.7 °C (98 °F)    TempSrc:   Oral    SpO2: 95% 97% 99% 97%   Weight:       Height: 1.702 m (5' 7\")        BMI  Body mass index is 26 kg/m².    Vent Settings (last 24 hours)     ABG Results    No lab values to display.         Intake/Output Summary (Last 24 hours) at 7/28/2023 0917  Last data filed at 7/28/2023 0800  Gross per 24 hour   Intake 1200 " ml   Output --   Net 1200 ml       CBC Results       07/28/23 07/27/23 07/26/23     0610 0401 1456    WBC 4.08 4.15 6.22    RBC 3.48 3.06 3.48    HGB 10.9 9.7 11.0    HCT 33.4 29.1 33.0    MCV 96.0 95.1 94.8    MCH 31.3 31.7 31.6    MCHC 32.6 33.3 33.3     166 181        BMP Results       07/28/23 07/27/23 07/21/23     0451 0401 0959     139 137    K 3.7 3.6 3.9    Cl 109 111 103    CO2 24 24 27    Glucose 97 99 86    BUN 12 11 14    Creatinine 0.5 0.5 0.6    Calcium 9.1 8.4 9.5    Anion Gap 7 4 7    EGFR >60.0 >60.0 >60.0        Lab Results   Component Value Date    PTT 31 07/21/2023     Lab Results   Component Value Date    INR 0.9 07/21/2023    INR 0.9 06/08/2023     Physical Exam:  Con: NAD  MS: AAOx3, answering complex questions, following commands  CN: PERRL, EOMI, no facial asymmetry, tongue midline  Motor: 5/5 throughout  Sensory: light touch intact in all four extremities  Gait: Deferred secondary to fall risk   EYES:  as above, anicteric, no conjunctival pallor  MSK: no edema  Derm: no rashes or breakdown  Right radial site: clean, dry and intact with no hematoma. Ecchymosis present.     Imaging:  No new neuro imaging in the last 24 hours.     All images and laboratory findings performed at the time of the note were reviewed and interpreted.     * Cerebral aneurysm, nonruptured  Assessment & Plan  In summary, Nelly Meyer is a 82 y.o. female with a PMHx pf HTN, HLD and osteoporosis. 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   DSA on 6/15/2023 confirmed a basilar tip aneurysm measuring nearly 6 mm in its greatest dimension with a celestin's excresence along the dome. She is now S/P diagnostic cerberal angiogram with WALLER embolization POD2.   -Admit to ICU  -Q1 hour neuro checks  --140  -Continue Brillinta BID and ASA 81mg daily  -Cardiology following  -Ok  for discharge home this afternoon  -Follow up with Dr. Rain in 4 weeks no repeat imagining needed          Kymberly Nielson NP  07/28/23

## 2023-07-31 ENCOUNTER — TELEPHONE (OUTPATIENT)
Dept: NEUROSURGERY | Facility: CLINIC | Age: 83
End: 2023-07-31
Payer: COMMERCIAL

## 2023-07-31 NOTE — TELEPHONE ENCOUNTER
I spoke to the pharmacist are [facility] who confirmed that the remainder of patient's Brilinta refill would be available tomorrow, 8/1.     I additionally spoke to the patient who will bring her coupon in tomorrow. If there is any issues with the cost of the prescription, she will call our office.

## 2023-08-02 ENCOUNTER — TELEPHONE (OUTPATIENT)
Dept: NEUROSURGERY | Facility: CLINIC | Age: 83
End: 2023-08-02
Payer: COMMERCIAL

## 2023-08-02 NOTE — TELEPHONE ENCOUNTER
Called and spoke to patient.   She is doing well. I advised her to hold off on the dentist at this time until we discontinue the brillinta.     She denies headaches. She denies any bleeding.     We will see her in the office next week for follow.

## 2023-08-02 NOTE — TELEPHONE ENCOUNTER
Pt called, would like to know if there is any contradiction to pt seeing new dentist 8/9 where she will be getting cleaning and possible XRs.

## 2023-08-22 ENCOUNTER — OFFICE VISIT (OUTPATIENT)
Dept: NEUROSURGERY | Facility: CLINIC | Age: 83
End: 2023-08-22
Payer: COMMERCIAL

## 2023-08-22 VITALS
BODY MASS INDEX: 26.06 KG/M2 | OXYGEN SATURATION: 99 % | RESPIRATION RATE: 16 BRPM | HEIGHT: 67 IN | HEART RATE: 66 BPM | DIASTOLIC BLOOD PRESSURE: 70 MMHG | SYSTOLIC BLOOD PRESSURE: 148 MMHG | WEIGHT: 166 LBS | TEMPERATURE: 97.5 F

## 2023-08-22 DIAGNOSIS — I67.1 CEREBRAL ANEURYSM: Primary | ICD-10-CM

## 2023-08-22 PROCEDURE — 99024 POSTOP FOLLOW-UP VISIT: CPT | Performed by: STUDENT IN AN ORGANIZED HEALTH CARE EDUCATION/TRAINING PROGRAM

## 2023-08-22 ASSESSMENT — ENCOUNTER SYMPTOMS
CARDIOVASCULAR NEGATIVE: 1
ENDOCRINE NEGATIVE: 1
MUSCULOSKELETAL NEGATIVE: 1
ALLERGIC/IMMUNOLOGIC NEGATIVE: 1
NEUROLOGICAL NEGATIVE: 1
RESPIRATORY NEGATIVE: 1
HEMATOLOGIC/LYMPHATIC NEGATIVE: 1
EYES NEGATIVE: 1
GASTROINTESTINAL NEGATIVE: 1
PSYCHIATRIC NEGATIVE: 1

## 2023-08-22 NOTE — LETTER
August 22, 2023     Shahab Palacio MD  [address]  [place] [address]    Patient: Nelly Meyer  YOB: 1940  Date of Visit: 8/22/2023      Dear Dr. Palacio:    Thank you for referring Nelly Meyer to me for evaluation. Below are my notes for this consultation.    If you have questions, please do not hesitate to call me. I look forward to following your patient along with you.         Sincerely,        Ramiro Rain MD        CC: MD Ev Hathaway MD Robert A Ruggiero Jr., Ramiro Cooper MD  8/22/2023 12:44 PM  Signed  CC: Cerebral Aneurysm     HPI: Nelly Meyer is a 82 y.o.  female, with significant past medical history of HTN, HLD, and osteoporosis, who presented to [facility] for evaluation s/p fall. Patient states she was standing on a stool in her bathroom changing a sconce light bulb when she lost her footing and fell backwards onto the tile, striking the back of her head. She denies loss of consciousness and recalls the entire event. Head CT was completed upon arrival to the ED, followed by CTA, showing a 7mm basilar tip aneurysm, prompting neurointervention consult.    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 She had an uneventful hospital stay and was discharged home. She was started on Plavix 75mg and ASA 81mg daily. P2Y12 was 193 and the patient was initiated on Brillinta 90mg BID.  She returned on 7/26/2023 for a repeat diagnostic cerebral angiogram with WEB embolization. Post procedure she was admitted to the ICU for close observation and continued management. She was discharged home on POD2.      She returns today to the clinic today for her first post operative visit with further evaluation and recommendations. Since discharge home she has been feeling well. She has returned to her [employer] program without compliant. She continues on daily ASA and Brillinta 90mg BID.  She denies WHOL, dizziness, weakness or difficulty with speech. She was a former smoker. She denies family history of cerebral aneurysms.       PMH:   has a past medical history of Anemia, Basal cell carcinoma, Constipation, Gallstones, GERD (gastroesophageal reflux disease), Hiatal hernia, Hypertension, Kidney stone, Lipid disorder, Osteoporosis, and Postmenopausal.      PSH:  has a past surgical history that includes ORIF ankle fracture bimalleolar (Left); Breast surgery; Mohs surgery; Tear duct surgery (Right); Colonoscopy; and Esophagogastroduodenoscopy.      FH:  family history is not on file.      SH:    Social History     Tobacco Use   • Smoking status: Former   • Smokeless tobacco: Never   Vaping Use   • Vaping Use: Never used   Substance Use Topics   • Alcohol use: Not Currently     Comment: Patient reports she has not been drinking since her fall in May   • Drug use: No     ALL:  No Known Allergies    Medications:    Current Outpatient Medications   Medication Sig Dispense Refill   • acetaminophen (TYLENOL) 500 mg tablet Take 1 tablet (500 mg total) by mouth every 6 (six) hours as needed for pain.     • amLODIPine (NORVASC) 5 mg tablet Take 10 mg by mouth nightly.     • aspirin 81 mg enteric coated tablet Take 81 mg by mouth nightly.     • atorvastatin (LIPITOR) 40 mg tablet Take 40 mg by mouth nightly.     • cholecalciferol, vitamin D3, (VITAMIN D3) 25 mcg (1,000 unit) capsule Take 1,000 Units by mouth nightly.     • latanoprost 0.005 % drops, emulsion Administer 1 drop into both eyes nightly.     • lidocaine (ASPERCREME) 4 % adhesive patch,medicated topical patch Apply 1 patch topically daily as needed (back pain).     • lisinopriL (PRINIVIL) 20 mg tablet Take 1 tablet (20 mg total) by mouth nightly. 30 tablet 1   • ticagrelor (BRILINTA) 90 mg tablet Take 90 mg by mouth 2 (two) times a day.     • vit A/vit C/biotin/zinc/copper (HAIR-SKIN-NAIL,VIT A,C-BIOTIN, ORAL) Take 1 tablet by mouth daily.       No  current facility-administered medications for this visit.     Review of Systems   HENT: Negative.    Eyes: Negative.    Respiratory: Negative.    Cardiovascular: Negative.    Gastrointestinal: Negative.    Endocrine: Negative.    Genitourinary: Negative.    Musculoskeletal: Negative.    Skin: Negative.    Allergic/Immunologic: Negative.    Neurological: Negative.    Hematological: Negative.    Psychiatric/Behavioral: Negative.    All other systems reviewed and are negative.      EXAM:    There were no vitals filed for this visit.    Well appearing female in NAD.    Neurologic Exam     Mental Status   Oriented to person, place, and time.   Attention: normal.   Speech: speech is normal   Level of consciousness: alert  Knowledge: good.     Cranial Nerves   Cranial nerves II through XII intact.     Motor Exam   Muscle bulk: normal  Right arm pronator drift: absent  Left arm pronator drift: absent    Strength   Strength 5/5 throughout.     Sensory Exam   Light touch normal.     Gait, Coordination, and Reflexes     Gait  Gait: normal    DATA REVIEW:  Neuro interventional Procedure 7/26/2023  DETAILS OF PROCEDURE:  The patient was appropriately positioned and identified by all staff on the INR table and an Neeraj's test was performed demonstrating good collateral circulation. The right wrist was prepped and draped in a sterile fashion and a time-out was performed. The site was anesthetized with 2% lidocaine, and the right radial artery was catheterized under sterile ultrasound guidance with a 7-Armenian sheath using the Seldinger technique. A cocktail of heparin, nicardipene, and nitroglycerin was injected for vasodilatation and a run was performed to assess the radial artery. A still shot was sent to PACS. An 038 terumo guidewire was inserted through the sheath and advanced into the brachial artery. The sheath was then removed and exchanged for a 6-Armenian Ballast catheter, which was advanced over the wire using a dilator.  The dilator was removed and a sanchez-2 catheter was advanced over the guidewire into the aortic arch  Selectively, the right vertebral artery was catheterized.  At that point, the Ballast was positioned in the V3 segment of the right vertebral artery. The patient then received 5000 units of heparin for prevention of thromboembolic events. Then a cerebral angiogram was performed.  Multiple runs in multiple views with three dimensional reconstruction at a separate workstation, again noted the cerebral aneurysm in the basilar tip artery region, measuring 4.5 mm x 5.0 mm x 4.7 mm.  Subsequent with a Via 17 microcatheter, Synchro II micro guidewire, the aneurysm was micro catheterized, based on optimal views from the 3D reconstruction.  Then the micro guidewire was removed and a WEB-IT measuring 6 mm  x 3 mm was carefully deployed inside the aneurysm.  Following successful positioning of the device, a cerebral angiogram was performed of the right vertebral artery. Multiple runs in multiple views, showed excellent positioning of the device, significant stasis within the aneurysm, which carried out into the late venous phase, and no evidence of vascular cutoff. Subsequently, the device was detached and the micro catheter was removed, and a final cerebral angiogram was performed of the right vertebral artery. Multiple runs in multiple views, showed excellent positioning of the device, significant stasis within the aneurysm, which carried out into the late venous phase, and no evidence of vascular cutoff. Mean transient time was normal.  The right artery was catheterized.  There was normal filling of all segments. The sheath was then removed with placement of vasc band device.  The patient tolerated the procedure very well.  The neurophysiological monitoring was stable and normal throughout the procedure. The patient was extubated and transferred at his neurological baseline to the NICU for recovery.  I was present for all  key and critical portions of the procedure.    I personally reviewed and interpreted the images as well.   I personally reviewed the patient's Epic record and referring physician's notes.     A/P:  In summary, Nelly Meyer is a 82 y.o. female s/p successful WEB embolization of basilar tip aneurysm. She remains neurologically intact and is doing well, has resumed normal activity, particularly her [email or web address] walking club which she has been enjoying.     At this juncture I am recommending:     Continue Brillinta 90mg through Sunday 8/27/2023  Continue 81mg ASA daily   Follow up 6 month MRA head with/without contrast with repeat diagnostic cerebral angiogram     All questions answered in detail. The patient was thankful for the care she has received thus far and I am pleased with her results.

## 2023-08-22 NOTE — PROGRESS NOTES
CC: Cerebral Aneurysm     HPI: Nelly Meyer is a 82 y.o.  female, with significant past medical history of HTN, HLD, and osteoporosis, who presented to [place] ED for evaluation s/p fall. Patient states she was standing on a stool in her bathroom changing a sconce light bulb when she lost her footing and fell backwards onto the tile, striking the back of her head. She denies loss of consciousness and recalls the entire event. Head CT was completed upon arrival to the ED, followed by CTA, showing a 7mm basilar tip aneurysm, prompting neurointervention consult.    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 She had an uneventful hospital stay and was discharged home. She was started on Plavix 75mg and ASA 81mg daily. P2Y12 was 193 and the patient was initiated on Brillinta 90mg BID.  She returned on 7/26/2023 for a repeat diagnostic cerebral angiogram with WEB embolization. Post procedure she was admitted to the ICU for close observation and continued management. She was discharged home on POD2.      She returns today to the clinic today for her first post operative visit with further evaluation and recommendations. Since discharge home she has been feeling well. She has returned to her [employer] program without compliant. She continues on daily ASA and Brillinta 90mg BID. She denies WHOL, dizziness, weakness or difficulty with speech. She was a former smoker. She denies family history of cerebral aneurysms.       PMH:   has a past medical history of Anemia, Basal cell carcinoma, Constipation, Gallstones, GERD (gastroesophageal reflux disease), Hiatal hernia, Hypertension, Kidney stone, Lipid disorder, Osteoporosis, and Postmenopausal.      PSH:  has a past surgical history that includes ORIF ankle fracture bimalleolar (Left); Breast surgery; Mohs surgery; Tear duct surgery (Right); Colonoscopy; and Esophagogastroduodenoscopy.      FH:  family history is not on  file.      SH:    Social History     Tobacco Use   • Smoking status: Former   • Smokeless tobacco: Never   Vaping Use   • Vaping Use: Never used   Substance Use Topics   • Alcohol use: Not Currently     Comment: Patient reports she has not been drinking since her fall in May   • Drug use: No     ALL:  No Known Allergies    Medications:    Current Outpatient Medications   Medication Sig Dispense Refill   • acetaminophen (TYLENOL) 500 mg tablet Take 1 tablet (500 mg total) by mouth every 6 (six) hours as needed for pain.     • amLODIPine (NORVASC) 5 mg tablet Take 10 mg by mouth nightly.     • aspirin 81 mg enteric coated tablet Take 81 mg by mouth nightly.     • atorvastatin (LIPITOR) 40 mg tablet Take 40 mg by mouth nightly.     • cholecalciferol, vitamin D3, (VITAMIN D3) 25 mcg (1,000 unit) capsule Take 1,000 Units by mouth nightly.     • latanoprost 0.005 % drops, emulsion Administer 1 drop into both eyes nightly.     • lidocaine (ASPERCREME) 4 % adhesive patch,medicated topical patch Apply 1 patch topically daily as needed (back pain).     • lisinopriL (PRINIVIL) 20 mg tablet Take 1 tablet (20 mg total) by mouth nightly. 30 tablet 1   • ticagrelor (BRILINTA) 90 mg tablet Take 90 mg by mouth 2 (two) times a day.     • vit A/vit C/biotin/zinc/copper (HAIR-SKIN-NAIL,VIT A,C-BIOTIN, ORAL) Take 1 tablet by mouth daily.       No current facility-administered medications for this visit.     Review of Systems   HENT: Negative.    Eyes: Negative.    Respiratory: Negative.    Cardiovascular: Negative.    Gastrointestinal: Negative.    Endocrine: Negative.    Genitourinary: Negative.    Musculoskeletal: Negative.    Skin: Negative.    Allergic/Immunologic: Negative.    Neurological: Negative.    Hematological: Negative.    Psychiatric/Behavioral: Negative.    All other systems reviewed and are negative.      EXAM:    There were no vitals filed for this visit.    Well appearing female in NAD.    Neurologic Exam     Mental  Status   Oriented to person, place, and time.   Attention: normal.   Speech: speech is normal   Level of consciousness: alert  Knowledge: good.     Cranial Nerves   Cranial nerves II through XII intact.     Motor Exam   Muscle bulk: normal  Right arm pronator drift: absent  Left arm pronator drift: absent    Strength   Strength 5/5 throughout.     Sensory Exam   Light touch normal.     Gait, Coordination, and Reflexes     Gait  Gait: normal    DATA REVIEW:  Neuro interventional Procedure 7/26/2023  DETAILS OF PROCEDURE:  The patient was appropriately positioned and identified by all staff on the INR table and an Neeraj's test was performed demonstrating good collateral circulation. The right wrist was prepped and draped in a sterile fashion and a time-out was performed. The site was anesthetized with 2% lidocaine, and the right radial artery was catheterized under sterile ultrasound guidance with a 7-Swedish sheath using the Seldinger technique. A cocktail of heparin, nicardipene, and nitroglycerin was injected for vasodilatation and a run was performed to assess the radial artery. A still shot was sent to PACS. An 038 terumo guidewire was inserted through the sheath and advanced into the brachial artery. The sheath was then removed and exchanged for a 6-Swedish Ballast catheter, which was advanced over the wire using a dilator. The dilator was removed and a sanchez-2 catheter was advanced over the guidewire into the aortic arch  Selectively, the right vertebral artery was catheterized.  At that point, the Ballast was positioned in the V3 segment of the right vertebral artery. The patient then received 5000 units of heparin for prevention of thromboembolic events. Then a cerebral angiogram was performed.  Multiple runs in multiple views with three dimensional reconstruction at a separate workstation, again noted the cerebral aneurysm in the basilar tip artery region, measuring 4.5 mm x 5.0 mm x 4.7 mm.  Subsequent with  a Via 17 microcatheter, Synchro II micro guidewire, the aneurysm was micro catheterized, based on optimal views from the 3D reconstruction.  Then the micro guidewire was removed and a WEB-IT measuring 6 mm  x 3 mm was carefully deployed inside the aneurysm.  Following successful positioning of the device, a cerebral angiogram was performed of the right vertebral artery. Multiple runs in multiple views, showed excellent positioning of the device, significant stasis within the aneurysm, which carried out into the late venous phase, and no evidence of vascular cutoff. Subsequently, the device was detached and the micro catheter was removed, and a final cerebral angiogram was performed of the right vertebral artery. Multiple runs in multiple views, showed excellent positioning of the device, significant stasis within the aneurysm, which carried out into the late venous phase, and no evidence of vascular cutoff. Mean transient time was normal.  The right artery was catheterized.  There was normal filling of all segments. The sheath was then removed with placement of vasc band device.  The patient tolerated the procedure very well.  The neurophysiological monitoring was stable and normal throughout the procedure. The patient was extubated and transferred at his neurological baseline to the NICU for recovery.  I was present for all key and critical portions of the procedure.    I personally reviewed and interpreted the images as well.   I personally reviewed the patient's Epic record and referring physician's notes.     A/P:  In summary, Nelly Meyer is a 82 y.o. female s/p successful WEB embolization of basilar tip aneurysm. She remains neurologically intact and is doing well, has resumed normal activity, particularly her [employer] walking club which she has been enjoying.     At this juncture I am recommending:     Continue Brillinta 90mg through Sunday 8/27/2023  Continue 81mg ASA daily   Follow up 6  month MRA head with/without contrast with repeat diagnostic cerebral angiogram     All questions answered in detail. The patient was thankful for the care she has received thus far and I am pleased with her results.

## 2024-02-09 ENCOUNTER — HOSPITAL ENCOUNTER (OUTPATIENT)
Dept: RADIOLOGY | Facility: HOSPITAL | Age: 84
Discharge: HOME | End: 2024-02-09
Attending: NURSE PRACTITIONER
Payer: COMMERCIAL

## 2024-02-09 DIAGNOSIS — I67.1 CEREBRAL ANEURYSM: ICD-10-CM

## 2024-02-09 RX ORDER — GADOBUTROL 604.72 MG/ML
0.1 INJECTION INTRAVENOUS ONCE
Status: COMPLETED | OUTPATIENT
Start: 2024-02-09 | End: 2024-02-09

## 2024-02-09 RX ADMIN — GADOBUTROL 7.4 ML: 604.72 INJECTION INTRAVENOUS at 09:00

## 2024-02-14 ENCOUNTER — TELEPHONE (OUTPATIENT)
Dept: NEUROSURGERY | Facility: CLINIC | Age: 84
End: 2024-02-14
Payer: COMMERCIAL

## 2024-02-14 NOTE — TELEPHONE ENCOUNTER
Pt called, would like to do angio 3/21, H&P scheduled 3/5 @10am. Stated may need a later start time due to transportation, but will call back. Told pt ernesto will reach out as procedure gets closer to go over instructions.

## 2024-03-05 ENCOUNTER — HOSPITAL ENCOUNTER (OUTPATIENT)
Dept: CARDIOLOGY | Facility: HOSPITAL | Age: 84
Discharge: HOME | End: 2024-03-05
Attending: NURSE PRACTITIONER
Payer: COMMERCIAL

## 2024-03-05 ENCOUNTER — APPOINTMENT (OUTPATIENT)
Dept: LAB | Facility: HOSPITAL | Age: 84
End: 2024-03-05
Attending: NURSE PRACTITIONER
Payer: COMMERCIAL

## 2024-03-05 ENCOUNTER — OFFICE VISIT (OUTPATIENT)
Dept: NEUROSURGERY | Facility: CLINIC | Age: 84
End: 2024-03-05
Payer: COMMERCIAL

## 2024-03-05 VITALS
HEART RATE: 80 BPM | WEIGHT: 165 LBS | HEIGHT: 67 IN | DIASTOLIC BLOOD PRESSURE: 76 MMHG | OXYGEN SATURATION: 95 % | SYSTOLIC BLOOD PRESSURE: 128 MMHG | RESPIRATION RATE: 16 BRPM | BODY MASS INDEX: 25.9 KG/M2 | TEMPERATURE: 98.2 F

## 2024-03-05 DIAGNOSIS — I67.1 CEREBRAL ANEURYSM: ICD-10-CM

## 2024-03-05 DIAGNOSIS — I72.5 BASILAR ARTERY ANEURYSM (CMS/HCC): Primary | ICD-10-CM

## 2024-03-05 LAB
ANION GAP SERPL CALC-SCNC: 6 MEQ/L (ref 3–15)
APTT PPP: 29 SEC (ref 23–35)
BUN SERPL-MCNC: 13 MG/DL (ref 7–25)
CALCIUM SERPL-MCNC: 10 MG/DL (ref 8.6–10.3)
CHLORIDE SERPL-SCNC: 100 MEQ/L (ref 98–107)
CO2 SERPL-SCNC: 30 MEQ/L (ref 21–31)
CREAT SERPL-MCNC: 0.6 MG/DL (ref 0.6–1.2)
EGFRCR SERPLBLD CKD-EPI 2021: >60 ML/MIN/1.73M*2
ERYTHROCYTE [DISTWIDTH] IN BLOOD BY AUTOMATED COUNT: 12.9 % (ref 11.7–14.4)
GLUCOSE SERPL-MCNC: 90 MG/DL (ref 70–99)
HCT VFR BLD AUTO: 41.3 % (ref 35–45)
HGB BLD-MCNC: 13.6 G/DL (ref 11.8–15.7)
INR PPP: 1
MCH RBC QN AUTO: 30.9 PG (ref 28–33.2)
MCHC RBC AUTO-ENTMCNC: 32.9 G/DL (ref 32.2–35.5)
MCV RBC AUTO: 93.9 FL (ref 83–98)
PDW BLD AUTO: 10 FL (ref 9.4–12.3)
PLATELET # BLD AUTO: 223 K/UL (ref 150–369)
POTASSIUM SERPL-SCNC: 4.3 MEQ/L (ref 3.5–5.1)
PROTHROMBIN TIME: 12.7 SEC (ref 12.2–14.5)
RBC # BLD AUTO: 4.4 M/UL (ref 3.93–5.22)
SODIUM SERPL-SCNC: 136 MEQ/L (ref 136–145)
WBC # BLD AUTO: 3.43 K/UL (ref 3.8–10.5)

## 2024-03-05 PROCEDURE — 85027 COMPLETE CBC AUTOMATED: CPT

## 2024-03-05 PROCEDURE — 3074F SYST BP LT 130 MM HG: CPT | Performed by: NURSE PRACTITIONER

## 2024-03-05 PROCEDURE — 93005 ELECTROCARDIOGRAM TRACING: CPT

## 2024-03-05 PROCEDURE — 3008F BODY MASS INDEX DOCD: CPT | Performed by: NURSE PRACTITIONER

## 2024-03-05 PROCEDURE — 85610 PROTHROMBIN TIME: CPT

## 2024-03-05 PROCEDURE — 36415 COLL VENOUS BLD VENIPUNCTURE: CPT

## 2024-03-05 PROCEDURE — 3078F DIAST BP <80 MM HG: CPT | Performed by: NURSE PRACTITIONER

## 2024-03-05 PROCEDURE — 99213 OFFICE O/P EST LOW 20 MIN: CPT | Performed by: NURSE PRACTITIONER

## 2024-03-05 PROCEDURE — 80048 BASIC METABOLIC PNL TOTAL CA: CPT

## 2024-03-05 PROCEDURE — 85730 THROMBOPLASTIN TIME PARTIAL: CPT

## 2024-03-05 RX ORDER — HYDROCHLOROTHIAZIDE 12.5 MG/1
TABLET ORAL
COMMUNITY

## 2024-03-05 RX ORDER — LISINOPRIL 40 MG/1
40 TABLET ORAL DAILY
COMMUNITY

## 2024-03-05 ASSESSMENT — ENCOUNTER SYMPTOMS
EYES NEGATIVE: 1
ALLERGIC/IMMUNOLOGIC NEGATIVE: 1
HEMATOLOGIC/LYMPHATIC NEGATIVE: 1
PSYCHIATRIC NEGATIVE: 1
ENDOCRINE NEGATIVE: 1
GASTROINTESTINAL NEGATIVE: 1
BACK PAIN: 1
CARDIOVASCULAR NEGATIVE: 1
RESPIRATORY NEGATIVE: 1

## 2024-03-05 NOTE — H&P (VIEW-ONLY)
CC: History and Physical   Chief Complaint   Patient presents with   • Follow-up     HPI:  Nelly Meyer is a 83 y.o.  female who presents for a history and physical prior to her repeat diagnostic cerebral angiogram on 3/21/2024. She has a PMHx of HTN, HLD, and osteoporosis, who presented to [facility] for evaluation s/p fall. Patient states she was standing on a stool in her bathroom changing a sconce light bulb when she lost her footing and fell backwards onto the tile, striking the back of her head. She denies loss of consciousness and recalls the entire event. Head CT was completed upon arrival to the ED, followed by CTA, showing a 7mm basilar tip aneurysm, prompting neurointervention consult.     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 She had an uneventful hospital stay and was discharged home. She was started on Plavix 75mg and ASA 81mg daily. P2Y12 was 193 and the patient was initiated on Brillinta 90mg BID.  She returned on 7/26/2023 for a repeat diagnostic cerebral angiogram with WEB embolization. Post procedure she was admitted to the ICU for close observation and continued management. She was discharged home on POD2. Her brillinta medication was discontinued on 8/27/2023. She remains on 81mg ASA daily.      She returns today to the clinic today for evaluation and recommendations prior to her upcoming angiogram. She had a repeat brain MRA on 2/9/2024 which did not show any evidence of residual or recurrent aneurysm. She has returned to her [employer] program without compliant. She denies WHOL, dizziness, weakness or difficulty with speech. She was a former smoker. She denies family history of cerebral aneurysms.       PMH:   has a past medical history of Anemia, Basal cell carcinoma, Constipation, Gallstones, GERD (gastroesophageal reflux disease), Hiatal hernia, Hypertension, Kidney stone, Lipid disorder, Osteoporosis, and Postmenopausal.      PSH:   "has a past surgical history that includes ORIF ankle fracture bimalleolar (Left); Breast surgery; Mohs surgery; Tear duct surgery (Right); Colonoscopy; and Esophagogastroduodenoscopy.      FH:  family history is not on file.      SH:    Social History     Tobacco Use   • Smoking status: Former   • Smokeless tobacco: Never   Vaping Use   • Vaping Use: Never used   Substance Use Topics   • Alcohol use: Not Currently     Comment: Patient reports she has not been drinking since her fall in May   • Drug use: No     ALL:  No Known Allergies    Medications:    Current Outpatient Medications   Medication Sig Dispense Refill   • aspirin 81 mg enteric coated tablet Take 81 mg by mouth nightly.     • atorvastatin (LIPITOR) 40 mg tablet Take 40 mg by mouth nightly.     • cholecalciferol, vitamin D3, (VITAMIN D3) 25 mcg (1,000 unit) capsule Take 1,000 Units by mouth nightly.     • hydrochlorothiazide 12.5 mg tablet HYDROCHLOROTHIAZIDE 12.5 MG TABS     • latanoprost 0.005 % drops, emulsion Administer 1 drop into both eyes nightly.     • lisinopriL (PRINIVIL) 40 mg tablet Take 40 mg by mouth daily.     • vit A/vit C/biotin/zinc/copper (HAIR-SKIN-NAIL,VIT A,C-BIOTIN, ORAL) Take 1 tablet by mouth daily.       No current facility-administered medications for this visit.     Review of Systems   HENT: Negative.    Eyes: Negative.    Respiratory: Negative.    Cardiovascular: Negative.    Gastrointestinal: Negative.    Endocrine: Negative.    Genitourinary: Negative.    Musculoskeletal: Positive for back pain and gait problem.   Skin: Negative.    Allergic/Immunologic: Negative.    Hematological: Negative.    Psychiatric/Behavioral: Negative.    All other systems reviewed and are negative.    EXAM:  Vitals:    03/05/24 1021   BP: 128/76   Pulse: 80   Resp: 16   Temp: 36.8 °C (98.2 °F)   TempSrc: Temporal   SpO2: 95%   Weight: 74.8 kg (165 lb)   Height: 1.689 m (5' 6.5\")     Well appearing female in NAD.    Neurologic Exam    Heart: " Regular heart rate and rhythm, S1 and S2 normal, no murmur  Neck: Supple,  no carotid bruit  Lungs: Clear to auscultation, no wheezing  Abdomen: Soft, non tender to palpation  Eyes: PERRL, no conjunctival pallor   Musculoskeletal: No injury or deformity  Extremities: 2+DP pulses bilaterally, no ankle edema, no clubbing or cyanosis   Skin: Warm, dry, no rashes or lesions    DATA REVIEW:  MRI ANGIOGRAM HEAD WITH AND WITHOUT CONTRAST 2/9/2024  Findings:  There is normal antegrade flow and enhancement of the major intracranial  arteries.  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  There is a left posterior communicating artery with fetal origin of the left  posterior cerebral artery.  No evidence of high grade stenosis.  There is  susceptibility artifact at the basilar tip, compatible with patient's history of  prior embolization.  There is no evidence of flow or enhancement in the aneurysm  coil to suggest residual/recurrent aneurysm.  There is a stable 1.5 mm  infundibulum along the posterior aspect of the terminal segment of the right  internal carotid artery, as well as a stable 1.5 mm infundibulum along the  posterior superior aspect of the proximal M1 segment of the right middle  cerebral artery.  Impression: IMPRESSION:  Susceptibility artifact at the basilar tip, compatible with patient's history of  prior embolization.  No evidence of residual/recurrent aneurysm.    I personally reviewed and interpreted the images as well.     A/P:   Nelly Meyer is a 83 y.o. female with a history of a WALLER embolization of basilar tip aneurysm.  We will plan for a diagnostic cerebral angiogram on 3/21/2024.  We discussed the procedure in detail including the risks and we discussed the pre and post procedure instructions.  All questions were answered. She will obtain preadmission testing today which has been ordered and I will review the results once available. She will remain on 81mg ASA once  daily. She knows to call with any questions or concerns prior to the procedure.

## 2024-03-05 NOTE — PROGRESS NOTES
CC: History and Physical   Chief Complaint   Patient presents with   • Follow-up     HPI:  Nelly Meyer is a 83 y.o.  female who presents for a history and physical prior to her repeat diagnostic cerebral angiogram on 3/21/2024. She has a PMHx of HTN, HLD, and osteoporosis, who presented to [facility] ED for evaluation s/p fall. Patient states she was standing on a stool in her bathroom changing a sconce light bulb when she lost her footing and fell backwards onto the tile, striking the back of her head. She denies loss of consciousness and recalls the entire event. Head CT was completed upon arrival to the ED, followed by CTA, showing a 7mm basilar tip aneurysm, prompting neurointervention consult.     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 She had an uneventful hospital stay and was discharged home. She was started on Plavix 75mg and ASA 81mg daily. P2Y12 was 193 and the patient was initiated on Brillinta 90mg BID.  She returned on 7/26/2023 for a repeat diagnostic cerebral angiogram with WEB embolization. Post procedure she was admitted to the ICU for close observation and continued management. She was discharged home on POD2. Her brillinta medication was discontinued on 8/27/2023. She remains on 81mg ASA daily.      She returns today to the clinic today for evaluation and recommendations prior to her upcoming angiogram. She had a repeat brain MRA on 2/9/2024 which did not show any evidence of residual or recurrent aneurysm. She has returned to her [employer] program without compliant. She denies WHOL, dizziness, weakness or difficulty with speech. She was a former smoker. She denies family history of cerebral aneurysms.       PMH:   has a past medical history of Anemia, Basal cell carcinoma, Constipation, Gallstones, GERD (gastroesophageal reflux disease), Hiatal hernia, Hypertension, Kidney stone, Lipid disorder, Osteoporosis, and Postmenopausal.      PSH:   "has a past surgical history that includes ORIF ankle fracture bimalleolar (Left); Breast surgery; Mohs surgery; Tear duct surgery (Right); Colonoscopy; and Esophagogastroduodenoscopy.      FH:  family history is not on file.      SH:    Social History     Tobacco Use   • Smoking status: Former   • Smokeless tobacco: Never   Vaping Use   • Vaping Use: Never used   Substance Use Topics   • Alcohol use: Not Currently     Comment: Patient reports she has not been drinking since her fall in May   • Drug use: No     ALL:  No Known Allergies    Medications:    Current Outpatient Medications   Medication Sig Dispense Refill   • aspirin 81 mg enteric coated tablet Take 81 mg by mouth nightly.     • atorvastatin (LIPITOR) 40 mg tablet Take 40 mg by mouth nightly.     • cholecalciferol, vitamin D3, (VITAMIN D3) 25 mcg (1,000 unit) capsule Take 1,000 Units by mouth nightly.     • hydrochlorothiazide 12.5 mg tablet HYDROCHLOROTHIAZIDE 12.5 MG TABS     • latanoprost 0.005 % drops, emulsion Administer 1 drop into both eyes nightly.     • lisinopriL (PRINIVIL) 40 mg tablet Take 40 mg by mouth daily.     • vit A/vit C/biotin/zinc/copper (HAIR-SKIN-NAIL,VIT A,C-BIOTIN, ORAL) Take 1 tablet by mouth daily.       No current facility-administered medications for this visit.     Review of Systems   HENT: Negative.    Eyes: Negative.    Respiratory: Negative.    Cardiovascular: Negative.    Gastrointestinal: Negative.    Endocrine: Negative.    Genitourinary: Negative.    Musculoskeletal: Positive for back pain and gait problem.   Skin: Negative.    Allergic/Immunologic: Negative.    Hematological: Negative.    Psychiatric/Behavioral: Negative.    All other systems reviewed and are negative.    EXAM:  Vitals:    03/05/24 1021   BP: 128/76   Pulse: 80   Resp: 16   Temp: 36.8 °C (98.2 °F)   TempSrc: Temporal   SpO2: 95%   Weight: 74.8 kg (165 lb)   Height: 1.689 m (5' 6.5\")     Well appearing female in NAD.    Neurologic Exam    Heart: " Regular heart rate and rhythm, S1 and S2 normal, no murmur  Neck: Supple,  no carotid bruit  Lungs: Clear to auscultation, no wheezing  Abdomen: Soft, non tender to palpation  Eyes: PERRL, no conjunctival pallor   Musculoskeletal: No injury or deformity  Extremities: 2+DP pulses bilaterally, no ankle edema, no clubbing or cyanosis   Skin: Warm, dry, no rashes or lesions    DATA REVIEW:  MRI ANGIOGRAM HEAD WITH AND WITHOUT CONTRAST 2/9/2024  Findings:  There is normal antegrade flow and enhancement of the major intracranial  arteries.  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  There is a left posterior communicating artery with fetal origin of the left  posterior cerebral artery.  No evidence of high grade stenosis.  There is  susceptibility artifact at the basilar tip, compatible with patient's history of  prior embolization.  There is no evidence of flow or enhancement in the aneurysm  coil to suggest residual/recurrent aneurysm.  There is a stable 1.5 mm  infundibulum along the posterior aspect of the terminal segment of the right  internal carotid artery, as well as a stable 1.5 mm infundibulum along the  posterior superior aspect of the proximal M1 segment of the right middle  cerebral artery.  Impression: IMPRESSION:  Susceptibility artifact at the basilar tip, compatible with patient's history of  prior embolization.  No evidence of residual/recurrent aneurysm.    I personally reviewed and interpreted the images as well.     A/P:   Nelly Meyer is a 83 y.o. female with a history of a WALLER embolization of basilar tip aneurysm.  We will plan for a diagnostic cerebral angiogram on 3/21/2024.  We discussed the procedure in detail including the risks and we discussed the pre and post procedure instructions.  All questions were answered. She will obtain preadmission testing today which has been ordered and I will review the results once available. She will remain on 81mg ASA once  daily. She knows to call with any questions or concerns prior to the procedure.

## 2024-03-06 LAB
ATRIAL RATE: 71
P AXIS: 47
PR INTERVAL: 204
QRS DURATION: 106
QT INTERVAL: 386
QTC CALCULATION(BAZETT): 419
R AXIS: 35
T WAVE AXIS: 59
VENTRICULAR RATE: 71

## 2024-03-06 PROCEDURE — 200200 ECG 12-LEAD: Performed by: NURSE PRACTITIONER

## 2024-03-20 ENCOUNTER — ANESTHESIA EVENT (OUTPATIENT)
Dept: CARDIOLOGY | Facility: HOSPITAL | Age: 84
Setting detail: HOSPITAL OUTPATIENT SURGERY
End: 2024-03-20
Payer: COMMERCIAL

## 2024-03-21 ENCOUNTER — ANESTHESIA (OUTPATIENT)
Dept: CARDIOLOGY | Facility: HOSPITAL | Age: 84
Setting detail: HOSPITAL OUTPATIENT SURGERY
End: 2024-03-21
Payer: COMMERCIAL

## 2024-03-21 ENCOUNTER — HOSPITAL ENCOUNTER (OUTPATIENT)
Facility: HOSPITAL | Age: 84
Setting detail: HOSPITAL OUTPATIENT SURGERY
Discharge: HOME | End: 2024-03-21
Attending: STUDENT IN AN ORGANIZED HEALTH CARE EDUCATION/TRAINING PROGRAM | Admitting: STUDENT IN AN ORGANIZED HEALTH CARE EDUCATION/TRAINING PROGRAM
Payer: COMMERCIAL

## 2024-03-21 VITALS
OXYGEN SATURATION: 95 % | SYSTOLIC BLOOD PRESSURE: 120 MMHG | RESPIRATION RATE: 13 BRPM | DIASTOLIC BLOOD PRESSURE: 59 MMHG | TEMPERATURE: 98.4 F | WEIGHT: 161 LBS | HEIGHT: 66 IN | HEART RATE: 59 BPM | BODY MASS INDEX: 25.88 KG/M2

## 2024-03-21 DIAGNOSIS — I67.1 CEREBRAL ANEURYSM: ICD-10-CM

## 2024-03-21 DIAGNOSIS — S22.080A T12 COMPRESSION FRACTURE, INITIAL ENCOUNTER (CMS/HCC): Primary | ICD-10-CM

## 2024-03-21 PROCEDURE — 63600105 HC IODINE BASED CONTRAST: Mod: JW | Performed by: STUDENT IN AN ORGANIZED HEALTH CARE EDUCATION/TRAINING PROGRAM

## 2024-03-21 PROCEDURE — 37000002 HC ANESTHESIA MAC: Performed by: STUDENT IN AN ORGANIZED HEALTH CARE EDUCATION/TRAINING PROGRAM

## 2024-03-21 PROCEDURE — B31FYZZ FLUOROSCOPY OF LEFT VERTEBRAL ARTERY USING OTHER CONTRAST: ICD-10-PCS | Performed by: STUDENT IN AN ORGANIZED HEALTH CARE EDUCATION/TRAINING PROGRAM

## 2024-03-21 PROCEDURE — 36000460 HC NL ROOM CHARGE - 60 MINUTES: Performed by: STUDENT IN AN ORGANIZED HEALTH CARE EDUCATION/TRAINING PROGRAM

## 2024-03-21 PROCEDURE — 27200000 HC STERILE SUPPLY: Performed by: STUDENT IN AN ORGANIZED HEALTH CARE EDUCATION/TRAINING PROGRAM

## 2024-03-21 PROCEDURE — 25000000 HC PHARMACY GENERAL: Performed by: STUDENT IN AN ORGANIZED HEALTH CARE EDUCATION/TRAINING PROGRAM

## 2024-03-21 PROCEDURE — 63600000 HC DRUGS/DETAIL CODE: Mod: JZ | Performed by: NURSE ANESTHETIST, CERTIFIED REGISTERED

## 2024-03-21 PROCEDURE — 76937 US GUIDE VASCULAR ACCESS: CPT | Performed by: STUDENT IN AN ORGANIZED HEALTH CARE EDUCATION/TRAINING PROGRAM

## 2024-03-21 PROCEDURE — 71000001 HC PACU PHASE 1 INITIAL 30MIN: Performed by: STUDENT IN AN ORGANIZED HEALTH CARE EDUCATION/TRAINING PROGRAM

## 2024-03-21 PROCEDURE — 36226 PLACE CATH VERTEBRAL ART: CPT | Mod: LT | Performed by: STUDENT IN AN ORGANIZED HEALTH CARE EDUCATION/TRAINING PROGRAM

## 2024-03-21 PROCEDURE — C1894 INTRO/SHEATH, NON-LASER: HCPCS | Performed by: STUDENT IN AN ORGANIZED HEALTH CARE EDUCATION/TRAINING PROGRAM

## 2024-03-21 PROCEDURE — 71000011 HC PACU PHASE 1 EA ADDL MIN: Performed by: STUDENT IN AN ORGANIZED HEALTH CARE EDUCATION/TRAINING PROGRAM

## 2024-03-21 PROCEDURE — 76937 US GUIDE VASCULAR ACCESS: CPT | Mod: 26 | Performed by: STUDENT IN AN ORGANIZED HEALTH CARE EDUCATION/TRAINING PROGRAM

## 2024-03-21 PROCEDURE — 25800000 HC PHARMACY IV SOLUTIONS: Performed by: NURSE ANESTHETIST, CERTIFIED REGISTERED

## 2024-03-21 PROCEDURE — C1769 GUIDE WIRE: HCPCS | Performed by: STUDENT IN AN ORGANIZED HEALTH CARE EDUCATION/TRAINING PROGRAM

## 2024-03-21 PROCEDURE — C1757 CATH, THROMBECTOMY/EMBOLECT: HCPCS | Performed by: STUDENT IN AN ORGANIZED HEALTH CARE EDUCATION/TRAINING PROGRAM

## 2024-03-21 PROCEDURE — 63600000 HC DRUGS/DETAIL CODE: Performed by: STUDENT IN AN ORGANIZED HEALTH CARE EDUCATION/TRAINING PROGRAM

## 2024-03-21 PROCEDURE — 63600000 HC DRUGS/DETAIL CODE: Mod: JZ | Performed by: NURSE PRACTITIONER

## 2024-03-21 RX ORDER — DEXTROSE 40 %
15-30 GEL (GRAM) ORAL AS NEEDED
Status: DISCONTINUED | OUTPATIENT
Start: 2024-03-21 | End: 2024-03-21 | Stop reason: HOSPADM

## 2024-03-21 RX ORDER — VERAPAMIL HYDROCHLORIDE 2.5 MG/ML
INJECTION, SOLUTION INTRAVENOUS
Status: DISCONTINUED | OUTPATIENT
Start: 2024-03-21 | End: 2024-03-21 | Stop reason: HOSPADM

## 2024-03-21 RX ORDER — ONDANSETRON HYDROCHLORIDE 2 MG/ML
4 INJECTION, SOLUTION INTRAVENOUS
Status: CANCELLED | OUTPATIENT
Start: 2024-03-21

## 2024-03-21 RX ORDER — DEXTROSE 50 % IN WATER (D50W) INTRAVENOUS SYRINGE
25 AS NEEDED
Status: DISCONTINUED | OUTPATIENT
Start: 2024-03-21 | End: 2024-03-21 | Stop reason: HOSPADM

## 2024-03-21 RX ORDER — ONDANSETRON HYDROCHLORIDE 2 MG/ML
4 INJECTION, SOLUTION INTRAVENOUS EVERY 6 HOURS PRN
Status: DISCONTINUED | OUTPATIENT
Start: 2024-03-21 | End: 2024-03-21 | Stop reason: HOSPADM

## 2024-03-21 RX ORDER — MIDAZOLAM HYDROCHLORIDE 2 MG/2ML
INJECTION, SOLUTION INTRAMUSCULAR; INTRAVENOUS AS NEEDED
Status: DISCONTINUED | OUTPATIENT
Start: 2024-03-21 | End: 2024-03-21 | Stop reason: SURG

## 2024-03-21 RX ORDER — IODIXANOL 320 MG/ML
INJECTION, SOLUTION INTRAVASCULAR
Status: DISCONTINUED | OUTPATIENT
Start: 2024-03-21 | End: 2024-03-21 | Stop reason: HOSPADM

## 2024-03-21 RX ORDER — HEPARIN SODIUM 1000 [USP'U]/ML
INJECTION, SOLUTION INTRAVENOUS; SUBCUTANEOUS
Status: DISCONTINUED | OUTPATIENT
Start: 2024-03-21 | End: 2024-03-21 | Stop reason: HOSPADM

## 2024-03-21 RX ORDER — ACETAMINOPHEN 325 MG/1
650 TABLET ORAL EVERY 4 HOURS PRN
Status: DISCONTINUED | OUTPATIENT
Start: 2024-03-21 | End: 2024-03-21 | Stop reason: HOSPADM

## 2024-03-21 RX ORDER — HYDRALAZINE HYDROCHLORIDE 20 MG/ML
10 INJECTION INTRAMUSCULAR; INTRAVENOUS
Status: DISCONTINUED | OUTPATIENT
Start: 2024-03-21 | End: 2024-03-21 | Stop reason: HOSPADM

## 2024-03-21 RX ORDER — LABETALOL HYDROCHLORIDE 5 MG/ML
10 INJECTION, SOLUTION INTRAVENOUS
Status: DISCONTINUED | OUTPATIENT
Start: 2024-03-21 | End: 2024-03-21 | Stop reason: HOSPADM

## 2024-03-21 RX ORDER — LIDOCAINE HYDROCHLORIDE 10 MG/ML
INJECTION, SOLUTION INFILTRATION; PERINEURAL
Status: DISCONTINUED | OUTPATIENT
Start: 2024-03-21 | End: 2024-03-21 | Stop reason: HOSPADM

## 2024-03-21 RX ORDER — FENTANYL CITRATE 50 UG/ML
INJECTION, SOLUTION INTRAMUSCULAR; INTRAVENOUS AS NEEDED
Status: DISCONTINUED | OUTPATIENT
Start: 2024-03-21 | End: 2024-03-21 | Stop reason: SURG

## 2024-03-21 RX ORDER — IBUPROFEN 200 MG
16-32 TABLET ORAL AS NEEDED
Status: DISCONTINUED | OUTPATIENT
Start: 2024-03-21 | End: 2024-03-21 | Stop reason: HOSPADM

## 2024-03-21 RX ORDER — SODIUM CHLORIDE 9 MG/ML
INJECTION, SOLUTION INTRAVENOUS CONTINUOUS PRN
Status: DISCONTINUED | OUTPATIENT
Start: 2024-03-21 | End: 2024-03-21 | Stop reason: SURG

## 2024-03-21 RX ORDER — DEXAMETHASONE SODIUM PHOSPHATE 4 MG/ML
INJECTION, SOLUTION INTRA-ARTICULAR; INTRALESIONAL; INTRAMUSCULAR; INTRAVENOUS; SOFT TISSUE AS NEEDED
Status: DISCONTINUED | OUTPATIENT
Start: 2024-03-21 | End: 2024-03-21 | Stop reason: SURG

## 2024-03-21 RX ORDER — DIPHENHYDRAMINE HCL 50 MG/ML
VIAL (ML) INJECTION AS NEEDED
Status: DISCONTINUED | OUTPATIENT
Start: 2024-03-21 | End: 2024-03-21 | Stop reason: SURG

## 2024-03-21 RX ADMIN — MIDAZOLAM HYDROCHLORIDE 2 MG: 1 INJECTION, SOLUTION INTRAMUSCULAR; INTRAVENOUS at 09:09

## 2024-03-21 RX ADMIN — DIPHENHYDRAMINE HYDROCHLORIDE 25 MG: 50 INJECTION INTRAMUSCULAR; INTRAVENOUS at 10:04

## 2024-03-21 RX ADMIN — MIDAZOLAM HYDROCHLORIDE 1 MG: 1 INJECTION, SOLUTION INTRAMUSCULAR; INTRAVENOUS at 09:16

## 2024-03-21 RX ADMIN — SODIUM CHLORIDE: 0.9 INJECTION, SOLUTION INTRAVENOUS at 09:06

## 2024-03-21 RX ADMIN — HYDRALAZINE HYDROCHLORIDE 10 MG: 20 INJECTION INTRAMUSCULAR; INTRAVENOUS at 12:05

## 2024-03-21 RX ADMIN — DEXAMETHASONE SODIUM PHOSPHATE 4 MG: 4 INJECTION, SOLUTION INTRA-ARTICULAR; INTRALESIONAL; INTRAMUSCULAR; INTRAVENOUS; SOFT TISSUE at 10:01

## 2024-03-21 RX ADMIN — FENTANYL CITRATE 25 MCG: 50 INJECTION, SOLUTION INTRAMUSCULAR; INTRAVENOUS at 09:18

## 2024-03-21 NOTE — POST-PROCEDURE NOTE
Pt resting quietly right radial band intact no bleeding or hematoma noted.  Small lump remains left forearm.  No change in size

## 2024-03-21 NOTE — BRIEF OP NOTE
Diagnostic cerebral angiogram Procedure Note    Procedure:    Diagnostic cerebral angiogram  CPT(R) Code:  99258 - CO SLCTV CATH CAROTID/INNOM ART ANGIO XTRCRANL ART      Pre-op Diagnosis     * Cerebral aneurysm [I67.1]       Post-op Diagnosis     * Cerebral aneurysm [I67.1]    Surgeon(s) and Role:     * Ramiro Rain MD - Primary    Anesthesia: Monitor Anesthesia Care    Staff:   Circulator: Medhat Calhoun  Scrub Person: Liliana Ortiz TECH  Documenter: Kadi Dukes RN    Procedure Details   LEFT RADIAL  5 Fr  Berenstein catheter  L vert  100% aneurysm cure       Estimated Blood Loss: 10 mL    Specimens:                No specimens collected during this procedure.      Drains: * No LDAs found *    Implants: * No implants in log *           Complications:  None; patient tolerated the procedure well.           Disposition: PACU - hemodynamically stable.           Condition: stable    L radial band x2 hours  Fu MRA 1 year  I spoke to patient's brother, Dav on the phone ([phone]) and updated him on the findings and plan.   Patient is also aware    Ramiro Rain MD  Phone Number: [phone]

## 2024-03-21 NOTE — Clinical Note
Patient placed on procedure table in supine position with right arm extended. Positioning devices: all pressure points padded, arm board under arms, safety strap applied, wrist straps in place and donut foam under head.

## 2024-03-21 NOTE — Clinical Note
The bilateral groins and bilateral radials was clipped, marked and prepped with ChloraPrep. The patient was draped in a sterile fashion after allowing for the recommended dry time.

## 2024-03-21 NOTE — DISCHARGE INSTRUCTIONS
.Neurosurgery Angiogram Discharge Instructions    Groin/Wrist Site Care:     A bruise or small lump under the skin where the catheters were is normal. These usually go away within one week.     Please check your wound site daily to make sure there is no redness, bleeding, or swelling.     The dressing should be removed the day following the procedure. A Band-Aid can be used if needed.    Showering:     You may shower 24 hours after your procedure. Clean the area with mild soap and water. Do not rub the area. Pat the area dry with a clean towel.     Do not take a bath or submerge the area under water for 3 days.     If you have bleeding where your catheter was:    Lie down and hold direct pressure for 10 minutes. If bleeding does not stop in 10 minutes, or if there is a large amount of bleeding, call 911. If bleeding does stop, rest for at least 4 hours. Call your doctor.    Activity:     Do not bend over, strain, push, pull, run or lift anything over 5-10 pounds for 7 days.     You may do light exercise in 7 days.     You may walk when you return home but try to limit the use of stairs for 14 hours if possible.     Driving:     Do not drive a car or use any machinery for 24 hours. Someone else must drive you home today.    Diet:     You may eat your normal diet as per your doctor.    Call your doctor if you have:     Increased redness, heat, pus, bruising, or bleeding at the site after 24 hours.     Swelling at the catheter site.     Increased pain at the catheter site.     Numbness, pain or coolness in the leg.     Temperature of 101.0 degrees Fahrenheit or greater.     Chest pain or shortness of breath.      Plan:    Continue aspirin 81 mg once daily.    Plan for repeat MRA head in 1 year.     Call Dr. Rain' office at [phone] with any questions or concerns

## 2024-03-21 NOTE — POST-PROCEDURE NOTE
Pt ambulated at baseline. Left wrist without bleeding or hematoma.  Bump on L FA seems to be less puffy.

## 2024-03-21 NOTE — ANESTHESIA PREPROCEDURE EVALUATION
Relevant Problems   CARDIOVASCULAR   (+) Hypertension      GASTROINTESTINAL   (+) Hiatal hernia       Anesthesia ROS/MED HX    Anesthesia History - neg  Pulmonary - neg  Cardiovascular   hypertension   ECG reviewed  Comments: Recent hospitalization and multiple medical evaluations noted.  Hematological    anemia  GI/Hepatic   Hiatal hernia   GERD  Musculoskeletal- neg  Renal Disease- neg  Endo/Other- neg  Body Habitus: Normal  ROS/MED HX Comments:    Neurology/Psychology: Basilar tip aneurysm s/p embolization in 7/2023 now s/f follow angio        Past Surgical History:   Procedure Laterality Date    BREAST SURGERY      right breast segmental resection    COLONOSCOPY      ESOPHAGOGASTRODUODENOSCOPY      MOHS SURGERY      nose    ORIF ANKLE FRACTURE BIMALLEOLAR Left     TEAR DUCT SURGERY Right     tear duct bypass       Physical Exam    Airway   Mallampati: III   TM distance: >3 FB   Neck ROM: full  Cardiovascular - normal   Rhythm: regular   Rate: normalPulmonary - normal   clear to auscultation  Other Findings   Recent hospitalization and multiple medical evaluations noted.  Dental    Teeth Problems: implants        Anesthesia Plan    Plan: MAC    Technique: total IV anesthesia     Lines and Monitors: PIV     Airway: natural airway / supplemental oxygen    2 ASA  Anesthetic plan and risks discussed with: patient  Postop Plan:   Patient Disposition: phase II then home  Comments:    Plan: Will discuss MAC vs GA with surgeon, patient agrees.

## 2024-03-21 NOTE — ANESTHESIOLOGIST PRE-PROCEDURE ATTESTATION
Pre-Procedure Patient Identification:  I am the Primary Anesthesiologist and have identified the patient on 03/21/24 at 8:30 AM.  I have confirmed the procedure(s) will be performed by the following surgeon/proceduralist Ramiro aRin MD.

## 2024-03-21 NOTE — ANESTHESIA POSTPROCEDURE EVALUATION
Patient: Nelly Meyer    Procedure Summary       Date: 03/21/24 Room / Location: [facility] NEURO LAB 4 / PH CARDIAC CATH/EP/NEURO    Anesthesia Start: 0906 Anesthesia Stop: 1016    Procedure: Diagnostic cerebral angiogram Diagnosis:       Cerebral aneurysm      (Cerebral aneurysm [I67.1])    Providers: Ramiro Rain MD Responsible Provider: Brisa Mir MD    Anesthesia Type: MAC ASA Status: 2            Anesthesia Type: MAC  PACU Vitals  3/21/2024 1008 - 3/21/2024 1031        3/21/2024  1015             BP: 175/77    Pulse: 66    Resp: 15    SpO2: 96 %              Anesthesia Post Evaluation    Pain management: adequate  Patient participation: complete - patient participated  Level of consciousness: awake and alert  Cardiovascular status: acceptable  Airway Patency: adequate  Respiratory status: acceptable  Hydration status: acceptable  Anesthetic complications: no

## 2024-03-21 NOTE — POST-PROCEDURE NOTE
Post procedure Dr. Rain noticed he patient had a small, soft lump on her left forearm above the cath site. Radial angiogram completed. Everything WDL per Dr. Rain. CRNA administered benadryl and solumedrol. Patient remains stable.

## 2024-03-21 NOTE — Clinical Note
Closure device placed for the left radial artery. Closure device used: radial compression system. Hemostasis achieved.

## 2024-03-21 NOTE — POST-PROCEDURE NOTE
Resting quietly Hydralazine given per orders.  Reviewed AVS with pt. And verbalizied understanding.

## 2024-03-28 ENCOUNTER — TRANSCRIBE ORDERS (OUTPATIENT)
Dept: REGISTRATION | Facility: HOSPITAL | Age: 84
End: 2024-03-28

## 2024-03-28 ENCOUNTER — HOSPITAL ENCOUNTER (OUTPATIENT)
Dept: RADIOLOGY | Facility: HOSPITAL | Age: 84
Discharge: HOME | End: 2024-03-28
Attending: FAMILY MEDICINE
Payer: COMMERCIAL

## 2024-03-28 ENCOUNTER — HOSPITAL ENCOUNTER (OUTPATIENT)
Dept: RADIOLOGY | Facility: HOSPITAL | Age: 84
Discharge: HOME | End: 2024-03-28
Attending: NURSE PRACTITIONER
Payer: COMMERCIAL

## 2024-03-28 DIAGNOSIS — M54.50 LOW BACK PAIN, UNSPECIFIED: ICD-10-CM

## 2024-03-28 DIAGNOSIS — S22.080A T12 COMPRESSION FRACTURE, INITIAL ENCOUNTER (CMS/HCC): ICD-10-CM

## 2024-03-28 DIAGNOSIS — M54.50 LOW BACK PAIN, UNSPECIFIED: Primary | ICD-10-CM

## 2024-03-28 PROCEDURE — 72080 X-RAY EXAM THORACOLMB 2/> VW: CPT

## 2024-03-28 PROCEDURE — 72170 X-RAY EXAM OF PELVIS: CPT

## 2024-07-31 ENCOUNTER — TRANSCRIBE ORDERS (OUTPATIENT)
Dept: SCHEDULING | Age: 84
End: 2024-07-31

## 2024-07-31 DIAGNOSIS — M81.0 AGE-RELATED OSTEOPOROSIS WITHOUT CURRENT PATHOLOGICAL FRACTURE: Primary | ICD-10-CM

## 2024-08-21 ENCOUNTER — HOSPITAL ENCOUNTER (OUTPATIENT)
Dept: RADIOLOGY | Facility: CLINIC | Age: 84
Discharge: HOME | End: 2024-08-21
Attending: FAMILY MEDICINE
Payer: COMMERCIAL

## 2024-08-21 DIAGNOSIS — M81.0 AGE-RELATED OSTEOPOROSIS WITHOUT CURRENT PATHOLOGICAL FRACTURE: ICD-10-CM

## 2024-08-21 PROCEDURE — 77080 DXA BONE DENSITY AXIAL: CPT

## 2025-03-17 ENCOUNTER — HOSPITAL ENCOUNTER (OUTPATIENT)
Dept: RADIOLOGY | Facility: HOSPITAL | Age: 85
Discharge: HOME | End: 2025-03-17
Attending: NURSE PRACTITIONER
Payer: COMMERCIAL

## 2025-03-17 DIAGNOSIS — I67.1 CEREBRAL ANEURYSM: ICD-10-CM

## 2025-03-17 RX ORDER — GADOBUTROL 604.72 MG/ML
0.1 INJECTION INTRAVENOUS ONCE
Status: COMPLETED | OUTPATIENT
Start: 2025-03-17 | End: 2025-03-17

## 2025-03-17 RX ADMIN — GADOBUTROL 7.1 ML: 604.72 INJECTION INTRAVENOUS at 10:01

## 2025-03-25 ENCOUNTER — OFFICE VISIT (OUTPATIENT)
Dept: NEUROSURGERY | Facility: CLINIC | Age: 85
End: 2025-03-25
Payer: COMMERCIAL

## 2025-03-25 VITALS
WEIGHT: 162 LBS | DIASTOLIC BLOOD PRESSURE: 74 MMHG | BODY MASS INDEX: 28.7 KG/M2 | HEIGHT: 63 IN | SYSTOLIC BLOOD PRESSURE: 110 MMHG | TEMPERATURE: 98.3 F | RESPIRATION RATE: 16 BRPM | HEART RATE: 83 BPM | OXYGEN SATURATION: 98 %

## 2025-03-25 DIAGNOSIS — I67.1 CEREBRAL ANEURYSM: Primary | ICD-10-CM

## 2025-03-25 PROCEDURE — 3074F SYST BP LT 130 MM HG: CPT | Performed by: STUDENT IN AN ORGANIZED HEALTH CARE EDUCATION/TRAINING PROGRAM

## 2025-03-25 PROCEDURE — 99214 OFFICE O/P EST MOD 30 MIN: CPT | Performed by: STUDENT IN AN ORGANIZED HEALTH CARE EDUCATION/TRAINING PROGRAM

## 2025-03-25 PROCEDURE — 3078F DIAST BP <80 MM HG: CPT | Performed by: STUDENT IN AN ORGANIZED HEALTH CARE EDUCATION/TRAINING PROGRAM

## 2025-03-25 PROCEDURE — 3008F BODY MASS INDEX DOCD: CPT | Performed by: STUDENT IN AN ORGANIZED HEALTH CARE EDUCATION/TRAINING PROGRAM

## 2025-03-25 NOTE — PROGRESS NOTES
CC:  Cerebral Aneurysm   Chief Complaint   Patient presents with    Follow-up     HPI: Nelly Meyer is a 83 y.o. female who for a follow up visit. She has a PMHx of HTN, HLD, and osteoporosis, who presented to [facility] for evaluation s/p fall. 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 She had an uneventful hospital stay and was discharged home. She was started on Plavix 75mg and ASA 81mg daily. P2Y12 was 193 and the patient was initiated on Brillinta 90mg BID. She returned on 7/26/2023 for a repeat diagnostic cerebral angiogram with WEB embolization. She tolerated the procedure well and was discharged home the following day.      She had a repeat brain MRA on 2/9/2024 which did not show any evidence of residual or recurrent aneurysm. Diagnostic cerebral angiogram on 3/21/2024 revealed 100% cure of her aneurysm. Repeat MRA on 3/17/2025 showed a small amount of flow on the post contrast images along the superior aspect of the coil measuring 3x3 mm. She has returned to her [employer] program without compliant. She denies WHOL, dizziness, weakness or difficulty with speech. She was a former smoker. She denies family history of cerebral aneurysms.         PMH:   has a past medical history of Anemia, Basal cell carcinoma, Constipation, Gallstones, GERD (gastroesophageal reflux disease), Hiatal hernia, Hypertension, Kidney stone, Lipid disorder, Osteoporosis, and Postmenopausal.      PSH:  has a past surgical history that includes ORIF ankle fracture bimalleolar (Left); Breast surgery; Mohs surgery; Tear duct surgery (Right); Colonoscopy; and Esophagogastroduodenoscopy.      FH:  family history is not on file.      SH:    Social History     Tobacco Use    Smoking status: Former    Smokeless tobacco: Never   Vaping Use    Vaping status: Never Used   Substance  "Use Topics    Alcohol use: Not Currently     Comment: Patient reports she has not been drinking since her fall in May    Drug use: No     ALL:  No Known Allergies    Medications:    Current Outpatient Medications   Medication Sig Dispense Refill    amLODIPine (NORVASC) 5 mg tablet Take 5 mg by mouth daily.      aspirin 81 mg enteric coated tablet Take 81 mg by mouth nightly.      atorvastatin (LIPITOR) 40 mg tablet Take 40 mg by mouth nightly.      cholecalciferol, vitamin D3, (VITAMIN D3) 25 mcg (1,000 unit) capsule Take 1,000 Units by mouth nightly. 2,000 IU      denosumab (PROLIA) 60 mg/mL syringe Inject 60 mg under the skin once. Every 6 months      hydrochlorothiazide 12.5 mg tablet HYDROCHLOROTHIAZIDE 12.5 MG TABS      latanoprost 0.005 % drops, emulsion Administer 1 drop into both eyes nightly.      lisinopriL (PRINIVIL) 40 mg tablet Take 40 mg by mouth daily.      zinc gluconate 50 mg tablet Take 50 mg by mouth daily.       No current facility-administered medications for this visit.     Review of Systems   All other systems reviewed and are negative.    EXAM:  Vitals:    03/25/25 1458   BP: 110/74   Pulse: 83   Resp: 16   Temp: 36.8 °C (98.3 °F)   TempSrc: Temporal   SpO2: 98%   Weight: 73.5 kg (162 lb)   Height: 1.6 m (5' 2.99\")     Well appearing female in NAD.  Alert, awake, oriented to self, place, and time  Speech is fluent and readily comprehensible  Pupils are equal, round, and reactive to light  Extra-ocular movements are intact  Face is symmetric  Tongue protrudes midline  Palate elevates symmetrically  Following commands in all 4 extremities  Motor:  RUE: 5/5; RLE: 5/5  LUE: 5/5; LLE: 5/5  No drift  Coordination: No dysmetria on finger-to-nose testing.  Deep tendon reflexes: 2+ biceps and patellar reflexes, bilaterally  Sensation to light touch is intact throughout  Gait exam: intact, no ataxia     DATA REVIEW:  MRI ANGIOGRAM HEAD WITH AND WITHOUT CONTRAST 3/17/2025  Findings:  There is " redemonstration of susceptibility artifact at the basilar tip  compatible with the patient's history of prior embolization.  There is a small amount of flow along the superior aspect of the coil mass the left of midline without evidence of flow related enhancement on the precontrast imaging.  This measures approximately 3 x 3 mm.  Evaluation of the distal internal carotid, anterior cerebral, middle cerebral, posterior cerebral and basilar arteries demonstrates no gross evidence for a significant focal stenosis/large vascular occlusion.  A left-sided posterior communicating artery is present.  Impression: IMPRESSION:  Small amount of flow on the postcontrast images along the superior aspect of the coil mass measuring 3 x 3 mm.  These findings were discussed with Dr. Rain at 4:10 PM on 3/17/2025.    I personally reviewed and interpreted the images as well.   I personally reviewed the patient's Epic record and referring physician's notes.     A/P:  In summary, Nelly Meyer is a 84 y.o. 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  She had a follow-up diagnostic cerebral angiogram which showed complete cure of the aneurysm and MRA that correlated with that at that time.  Her 1 year follow-up MRA was read as concerned about some residual flow at the base of the aneurysm.  I personally reviewed these images and correlated with the diagnostic cerebral angiogram findings.  I told her this likely represents flow anatomically to the posterior cerebral artery and I do not think there is true residual aneurysm.  I am reassured because this looks the same as her last MRI and this correlated with the diagnostic cerebral angiogram that I did.  I spent 15 minutes reviewing the images with the patient and answered all her questions in detail.  At this time I would continue with yearly MRIs and I do not see any reason for additional invasive  studies.    Will plan for MRA in 1 year.  The patient can continue a baby aspirin 81 mg daily.    Ramiro Rain MD

## 2025-03-25 NOTE — LETTER
April 9, 2025     Shahab Palacio MD  [address] [facility] [place] [address]    Patient: Nelly Meyer  YOB: 1940  Date of Visit: 3/25/2025      Dear Dr. Palacio:    Thank you for referring Nelly Meyer to me for evaluation. Below are my notes for this consultation.    If you have questions, please do not hesitate to call me. I look forward to following your patient along with you.         Sincerely,        Ramiro Rain MD        CC: MD Ev Hathaway MD Robert A Ruggiero Jr., MD Rain, Ramiro BELLO MD  4/9/2025  2:16 PM  Sign when Signing Visit  CC:  Cerebral Aneurysm   Chief Complaint   Patient presents with    Follow-up     HPI: Nelly Meyer is a 83 y.o. female who for a follow up visit. She has a PMHx of HTN, HLD, and osteoporosis, who presented to [facility] ED for evaluation s/p fall. 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 She had an uneventful hospital stay and was discharged home. She was started on Plavix 75mg and ASA 81mg daily. P2Y12 was 193 and the patient was initiated on Brillinta 90mg BID. She returned on 7/26/2023 for a repeat diagnostic cerebral angiogram with WEB embolization. She tolerated the procedure well and was discharged home the following day.      She had a repeat brain MRA on 2/9/2024 which did not show any evidence of residual or recurrent aneurysm. Diagnostic cerebral angiogram on 3/21/2024 revealed 100% cure of her aneurysm. Repeat MRA on 3/17/2025 showed a small amount of flow on the post contrast images along the superior aspect of the coil measuring 3x3 mm. She has returned to her [employer] program without compliant. She denies WHOL, dizziness, weakness or difficulty with speech. She was a former smoker. She denies family history of cerebral aneurysms.     "     PMH:   has a past medical history of Anemia, Basal cell carcinoma, Constipation, Gallstones, GERD (gastroesophageal reflux disease), Hiatal hernia, Hypertension, Kidney stone, Lipid disorder, Osteoporosis, and Postmenopausal.      PSH:  has a past surgical history that includes ORIF ankle fracture bimalleolar (Left); Breast surgery; Mohs surgery; Tear duct surgery (Right); Colonoscopy; and Esophagogastroduodenoscopy.      FH:  family history is not on file.      SH:    Social History     Tobacco Use    Smoking status: Former    Smokeless tobacco: Never   Vaping Use    Vaping status: Never Used   Substance Use Topics    Alcohol use: Not Currently     Comment: Patient reports she has not been drinking since her fall in May    Drug use: No     ALL:  No Known Allergies    Medications:    Current Outpatient Medications   Medication Sig Dispense Refill    amLODIPine (NORVASC) 5 mg tablet Take 5 mg by mouth daily.      aspirin 81 mg enteric coated tablet Take 81 mg by mouth nightly.      atorvastatin (LIPITOR) 40 mg tablet Take 40 mg by mouth nightly.      cholecalciferol, vitamin D3, (VITAMIN D3) 25 mcg (1,000 unit) capsule Take 1,000 Units by mouth nightly. 2,000 IU      denosumab (PROLIA) 60 mg/mL syringe Inject 60 mg under the skin once. Every 6 months      hydrochlorothiazide 12.5 mg tablet HYDROCHLOROTHIAZIDE 12.5 MG TABS      latanoprost 0.005 % drops, emulsion Administer 1 drop into both eyes nightly.      lisinopriL (PRINIVIL) 40 mg tablet Take 40 mg by mouth daily.      zinc gluconate 50 mg tablet Take 50 mg by mouth daily.       No current facility-administered medications for this visit.     Review of Systems   All other systems reviewed and are negative.    EXAM:  Vitals:    03/25/25 1458   BP: 110/74   Pulse: 83   Resp: 16   Temp: 36.8 °C (98.3 °F)   TempSrc: Temporal   SpO2: 98%   Weight: 73.5 kg (162 lb)   Height: 1.6 m (5' 2.99\")     Well appearing female in NAD.  Alert, awake, oriented " to self, place, and time  Speech is fluent and readily comprehensible  Pupils are equal, round, and reactive to light  Extra-ocular movements are intact  Face is symmetric  Tongue protrudes midline  Palate elevates symmetrically  Following commands in all 4 extremities  Motor:  RUE: 5/5; RLE: 5/5  LUE: 5/5; LLE: 5/5  No drift  Coordination: No dysmetria on finger-to-nose testing.  Deep tendon reflexes: 2+ biceps and patellar reflexes, bilaterally  Sensation to light touch is intact throughout  Gait exam: intact, no ataxia     DATA REVIEW:  MRI ANGIOGRAM HEAD WITH AND WITHOUT CONTRAST 3/17/2025  Findings:  There is redemonstration of susceptibility artifact at the basilar tip  compatible with the patient's history of prior embolization.  There is a small amount of flow along the superior aspect of the coil mass the left of midline without evidence of flow related enhancement on the precontrast imaging.  This measures approximately 3 x 3 mm.  Evaluation of the distal internal carotid, anterior cerebral, middle cerebral, posterior cerebral and basilar arteries demonstrates no gross evidence for a significant focal stenosis/large vascular occlusion.  A left-sided posterior communicating artery is present.  Impression: IMPRESSION:  Small amount of flow on the postcontrast images along the superior aspect of the coil mass measuring 3 x 3 mm.  These findings were discussed with Dr. Rain at 4:10 PM on 3/17/2025.    I personally reviewed and interpreted the images as well.   I personally reviewed the patient's Epic record and referring physician's notes.     A/P:  In summary, Nelly Meyer is a 84 y.o. 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  She had a follow-up diagnostic cerebral angiogram which showed complete cure of the aneurysm and MRA that correlated with that at that time.  Her 1 year follow-up MRA was read as concerned about  some residual flow at the base of the aneurysm.  I personally reviewed these images and correlated with the diagnostic cerebral angiogram findings.  I told her this likely represents flow anatomically to the posterior cerebral artery and I do not think there is true residual aneurysm.  I am reassured because this looks the same as her last MRI and this correlated with the diagnostic cerebral angiogram that I did.  I spent 15 minutes reviewing the images with the patient and answered all her questions in detail.  At this time I would continue with yearly MRIs and I do not see any reason for additional invasive studies.    Will plan for MRA in 1 year.  The patient can continue a baby aspirin 81 mg daily.    Ramiro Rain MD

## (undated) DEVICE — OINTMENT SURGILUBE 4OZ TUBE

## (undated) DEVICE — MANIFOLD SINGLE PORT NEPTUNE

## (undated) DEVICE — DRAPE ARM DAVINCI XI

## (undated) DEVICE — GUIDEWIRE REGULAR STANDARD ANGLE TIP 038 DIAMETER 180 CM LON

## (undated) DEVICE — Device

## (undated) DEVICE — SOLN IRRIG STER WATER 1000ML

## (undated) DEVICE — HOVERMATT 34IN FULL SINGLE PATI

## (undated) DEVICE — STOPCOCKS 3-WAY HIGH PRESSURE

## (undated) DEVICE — ***USE 57017*** SUTURE ETHIBOND 0       X412H

## (undated) DEVICE — PAD GROUND ELECTROSURGICAL W/CORD

## (undated) DEVICE — SOLN IRRIG .9%SOD 1000ML

## (undated) DEVICE — PAD POSITIONING XL W/ARM PROTECTORS

## (undated) DEVICE — ***USE 113251***SHEATH PRELUDE IDEAL 6F 11CM

## (undated) DEVICE — CATH NEURON SELECT 5FR 120 SIM

## (undated) DEVICE — TIPS SCISSOR MICROLINE LAP REPROCESSED

## (undated) DEVICE — MICROCATHETER VIA, 2.4F, 0.017 ID, STRAIGHT, 154CM

## (undated) DEVICE — TUOHY DEVICE LARGE BORE MAP111

## (undated) DEVICE — SUTURE MONOCRYL 4-0  Y496G PS-2 I8IN

## (undated) DEVICE — ***USE 119932 ***FORCEPS BIPOLAR FENESTRATED XI

## (undated) DEVICE — TROCAR XCEL BLADELESS 12MM X 100MM LONG

## (undated) DEVICE — SHEATH INTRODUCER PRELUDE IDEAL HYDROPHILIC SF 5F .021MM

## (undated) DEVICE — MANIFOLD FOUR PORT NEPTUNE

## (undated) DEVICE — SOLUTION ELECTROLUBE ANTI-STICK

## (undated) DEVICE — STOPCOCK 3WAY HIGH PRESSURE

## (undated) DEVICE — ***USE 113253***SHEATH PRELUDE IDEAL 5F 11CM

## (undated) DEVICE — ***USE 121412***PACK DEVICE IMPLANT TLH

## (undated) DEVICE — WEB DETACHMENT CONTROLLER
Type: IMPLANTABLE DEVICE | Site: CRANIAL | Status: NON-FUNCTIONAL
Brand: WEB DETACHMENT CONTROLLER
Removed: 2023-07-26

## (undated) DEVICE — SEAL UNIVERSAL 5MM-8MM XI

## (undated) DEVICE — 135" NEURO IV DRIP CHAMBER

## (undated) DEVICE — ***USE 119929 ***DRIVER NEEDLE LRG XI REPOSABLE

## (undated) DEVICE — DRESSING TELFA 3X4

## (undated) DEVICE — RADPAD Y FEMORAL ANGIOGRAPHY SHIELD

## (undated) DEVICE — SHEARS TIP COVER DAVINCI ONE USE

## (undated) DEVICE — ***USE 56941*** SUTURE VICRYL 0 J603H UR-6

## (undated) DEVICE — STERISTRIP 1/2INX4IN

## (undated) DEVICE — ***USE 143601*** PROBE COVER 48 IN STERILE WITH GEL

## (undated) DEVICE — DRAPE COLUMN DAVINCI XI

## (undated) DEVICE — ***USE 138711*** SUTURE VICRYL 0 J112T TIES 18IN

## (undated) DEVICE — CAUTERY HOOK DAVINCI 2 REPOSABLE

## (undated) DEVICE — DRAPE POUCH IRRIGATION

## (undated) DEVICE — MARKER SURGICAL SKIN

## (undated) DEVICE — DRAPE IOBAN

## (undated) DEVICE — ***LOW USAGE***KIT NEURO CUSTOM ANGIOGRAPHIC

## (undated) DEVICE — TUBING CONNECTOR HIGH PRESSURE 150CM MEDRAD

## (undated) DEVICE — TROCAR FIOS 5MM X 150MM

## (undated) DEVICE — ***USE 138152*** ENDOPOUCH RETRIEVE 10MM

## (undated) DEVICE — SEALER VESSEL DAVINCI XI DISP

## (undated) DEVICE — CARDTRIDGE GRASPER A-TRAC 5MM X 38CM

## (undated) DEVICE — R-BAND RADIAL HEMOSTASIS 24CM

## (undated) DEVICE — ***USE 119930 ***DRIVER NEEDLE MEGACUT SUTURECU

## (undated) DEVICE — MASTISOL LIQUID ADHESIVE

## (undated) DEVICE — TORQUE DEVICE MULTI VIS GLIDEWIRE

## (undated) DEVICE — STOPCOCK 3 WAY WITH LEUR LOCK

## (undated) DEVICE — CATH NEURON SELECT 5FR 120 BER

## (undated) DEVICE — SPONGE RAYTEC 8X4 12 PLY

## (undated) DEVICE — SYRINGE 10CC NO NEEDLE ST

## (undated) DEVICE — KIT CATH LAB ANGIO

## (undated) DEVICE — OBTURATOR BLADELESS 8MM XI

## (undated) DEVICE — CATH CEREBRAL VER 5F 135DEG

## (undated) DEVICE — GUIDEWIRE SYNCHRO-2 .014IN X 200CM STD, HYDPHIL

## (undated) DEVICE — SET MICROPUNCTURE INTRODUCER

## (undated) DEVICE — SHEATH LONG BALLAST 6F 088, 90 CM

## (undated) DEVICE — CATHETER SOFIA EX 115CM

## (undated) DEVICE — SUTURE MONOCRYL 4-0 Y426H PS-2 27IN